# Patient Record
Sex: FEMALE | Race: WHITE | Employment: FULL TIME | URBAN - METROPOLITAN AREA
[De-identification: names, ages, dates, MRNs, and addresses within clinical notes are randomized per-mention and may not be internally consistent; named-entity substitution may affect disease eponyms.]

---

## 2017-10-25 PROBLEM — J30.89 ENVIRONMENTAL AND SEASONAL ALLERGIES: Status: ACTIVE | Noted: 2017-10-25

## 2017-11-14 ENCOUNTER — HOSPITAL ENCOUNTER (OUTPATIENT)
Dept: MRI IMAGING | Age: 62
Discharge: HOME | End: 2017-11-14
Attending: OTOLARYNGOLOGY
Payer: COMMERCIAL

## 2017-11-14 LAB — CREAT BLD-MCNC: 0.8 MG/DL (ref 0.8–1.5)

## 2017-11-14 PROCEDURE — A9577 INJ MULTIHANCE: HCPCS | Performed by: OTOLARYNGOLOGY

## 2017-11-14 PROCEDURE — 70553 MRI BRAIN STEM W/O & W/DYE: CPT

## 2017-11-14 PROCEDURE — 82565 ASSAY OF CREATININE: CPT

## 2017-11-14 PROCEDURE — 74011250636 HC RX REV CODE- 250/636: Performed by: OTOLARYNGOLOGY

## 2017-11-14 RX ORDER — SODIUM CHLORIDE 0.9 % (FLUSH) 0.9 %
10 SYRINGE (ML) INJECTION
Status: COMPLETED | OUTPATIENT
Start: 2017-11-14 | End: 2017-11-14

## 2017-11-14 RX ADMIN — Medication 10 ML: at 13:55

## 2017-11-14 RX ADMIN — GADOBENATE DIMEGLUMINE 14 ML: 529 INJECTION, SOLUTION INTRAVENOUS at 13:55

## 2018-02-15 PROBLEM — S82.852A CLOSED TRIMALLEOLAR FRACTURE OF LEFT ANKLE: Status: ACTIVE | Noted: 2018-02-15

## 2018-02-15 PROBLEM — Z98.890 HISTORY OF BUNIONECTOMY OF RIGHT GREAT TOE: Status: ACTIVE | Noted: 2018-02-15

## 2018-04-18 ENCOUNTER — APPOINTMENT (RX ONLY)
Dept: URBAN - METROPOLITAN AREA CLINIC 24 | Facility: CLINIC | Age: 63
Setting detail: DERMATOLOGY
End: 2018-04-18

## 2018-04-18 DIAGNOSIS — D485 NEOPLASM OF UNCERTAIN BEHAVIOR OF SKIN: ICD-10-CM

## 2018-04-18 DIAGNOSIS — L82.1 OTHER SEBORRHEIC KERATOSIS: ICD-10-CM

## 2018-04-18 DIAGNOSIS — Z92.25 PERSONAL HISTORY OF IMMUNOSUPPRESSION THERAPY: ICD-10-CM

## 2018-04-18 DIAGNOSIS — Z71.89 OTHER SPECIFIED COUNSELING: ICD-10-CM

## 2018-04-18 DIAGNOSIS — L81.4 OTHER MELANIN HYPERPIGMENTATION: ICD-10-CM

## 2018-04-18 PROBLEM — J30.1 ALLERGIC RHINITIS DUE TO POLLEN: Status: ACTIVE | Noted: 2018-04-18

## 2018-04-18 PROBLEM — D48.5 NEOPLASM OF UNCERTAIN BEHAVIOR OF SKIN: Status: ACTIVE | Noted: 2018-04-18

## 2018-04-18 PROCEDURE — 99203 OFFICE O/P NEW LOW 30 MIN: CPT | Mod: 25

## 2018-04-18 PROCEDURE — ? OTHER

## 2018-04-18 PROCEDURE — 11100: CPT

## 2018-04-18 PROCEDURE — ? BIOPSY BY SHAVE METHOD

## 2018-04-18 PROCEDURE — ? COUNSELING

## 2018-04-18 ASSESSMENT — LOCATION SIMPLE DESCRIPTION DERM
LOCATION SIMPLE: LEFT EAR
LOCATION SIMPLE: RIGHT SHOULDER
LOCATION SIMPLE: RIGHT UPPER BACK
LOCATION SIMPLE: RIGHT FOREARM
LOCATION SIMPLE: RIGHT CALF
LOCATION SIMPLE: LEFT POSTERIOR THIGH
LOCATION SIMPLE: LEFT FOREARM
LOCATION SIMPLE: RIGHT POSTERIOR THIGH
LOCATION SIMPLE: ABDOMEN
LOCATION SIMPLE: LEFT ANTERIOR NECK
LOCATION SIMPLE: LEFT SHOULDER

## 2018-04-18 ASSESSMENT — LOCATION ZONE DERM
LOCATION ZONE: TRUNK
LOCATION ZONE: NECK
LOCATION ZONE: EAR
LOCATION ZONE: LEG
LOCATION ZONE: ARM

## 2018-04-18 ASSESSMENT — LOCATION DETAILED DESCRIPTION DERM
LOCATION DETAILED: RIGHT MEDIAL UPPER BACK
LOCATION DETAILED: LEFT PROXIMAL DORSAL FOREARM
LOCATION DETAILED: RIGHT PROXIMAL DORSAL FOREARM
LOCATION DETAILED: LEFT POSTERIOR SHOULDER
LOCATION DETAILED: RIGHT SUPERIOR MEDIAL UPPER BACK
LOCATION DETAILED: LEFT DISTAL POSTERIOR THIGH
LOCATION DETAILED: RIGHT POSTERIOR SHOULDER
LOCATION DETAILED: SUBXIPHOID
LOCATION DETAILED: LEFT INFERIOR LATERAL NECK
LOCATION DETAILED: RIGHT PROXIMAL CALF
LOCATION DETAILED: LEFT CRUS OF HELIX
LOCATION DETAILED: RIGHT DISTAL POSTERIOR THIGH
LOCATION DETAILED: LEFT ANTERIOR SHOULDER

## 2018-04-18 NOTE — PROCEDURE: BIOPSY BY SHAVE METHOD
Size Of Lesion In Cm: 0
Detail Level: Detailed
Billing Type: Third-Party Bill
Curettage Text: The wound bed was treated with curettage after the biopsy was performed.
Electrodesiccation Text: The wound bed was treated with electrodesiccation after the biopsy was performed.
Accession #: [id number]
Electrodesiccation And Curettage Text: The wound bed was treated with electrodesiccation and curettage after the biopsy was performed.
Anesthesia Type: 1% lidocaine with 1:100,000 epinephrine and a 1:6 solution of 8.4% sodium bicarbonate
Cryotherapy Text: The wound bed was treated with cryotherapy after the biopsy was performed.
Anesthesia Volume In Cc: 0.5
Wound Care: Petrolatum
Bill For Surgical Tray: no
Biopsy Method: Dermablade
Silver Nitrate Text: The wound bed was treated with silver nitrate after the biopsy was performed.
Hemostasis: Aluminum Chloride
Was A Bandage Applied: Yes
Dressing: bandage
Biopsy Type: H and E
Type Of Destruction Used: Curettage

## 2018-04-18 NOTE — PROCEDURE: OTHER
Note Text (......Xxx Chief Complaint.): This diagnosis correlates with the
Detail Level: Simple
Other (Free Text): Just started cellcept for autoimmune hearloss

## 2018-05-04 ENCOUNTER — APPOINTMENT (RX ONLY)
Dept: URBAN - METROPOLITAN AREA CLINIC 24 | Facility: CLINIC | Age: 63
Setting detail: DERMATOLOGY
End: 2018-05-04

## 2018-05-04 PROBLEM — F32.9 MAJOR DEPRESSIVE DISORDER, SINGLE EPISODE, UNSPECIFIED: Status: ACTIVE | Noted: 2018-05-04

## 2018-05-04 PROBLEM — C44.519 BASAL CELL CARCINOMA OF SKIN OF OTHER PART OF TRUNK: Status: ACTIVE | Noted: 2018-05-04

## 2018-05-04 PROCEDURE — 17261 DSTRJ MAL LES T/A/L .6-1.0CM: CPT

## 2018-05-04 PROCEDURE — ? CURETTAGE AND DESTRUCTION

## 2018-05-04 NOTE — PROCEDURE: CURETTAGE AND DESTRUCTION
Bill As A Line Item Or As Units: Line Item
Render Post-Care Instructions In Note?: no
Additional Information: (Optional): The wound was cleaned, and a pressure dressing was applied.  The patient received detailed post-op instructions.
Detail Level: Detailed
Consent was obtained from the patient. The risks, benefits and alternatives to therapy were discussed in detail. Specifically, the risks of infection, scarring, bleeding, prolonged wound healing, nerve injury, incomplete removal, allergy to anesthesia and recurrence were addressed. Alternatives to ED&C, such as: surgical removal and XRT were also discussed.  Prior to the procedure, the treatment site was clearly identified and confirmed by the patient. All components of Universal Protocol/PAUSE Rule completed.
Post-Care Instructions: I reviewed with the patient in detail post-care instructions. Patient is to keep the area dry for 48 hours, and not to engage in any swimming until the area is healed. Should the patient develop any fevers, chills, bleeding, severe pain patient will contact the office immediately.
Number Of Curettages: 3
Size Of Lesion In Cm: 0.6
Total Volume (Ccs): 1
Cautery Type: electrodesiccation
Size Of Lesion After Curettage: 0.8
Anesthesia Type: 1% lidocaine with 1:100,000 epinephrine and a 1:10 solution of 8.4% sodium bicarbonate
What Was Performed First?: Curettage

## 2018-06-25 ENCOUNTER — HOSPITAL ENCOUNTER (OUTPATIENT)
Dept: PHYSICAL THERAPY | Age: 63
End: 2018-06-25
Attending: NURSE PRACTITIONER

## 2018-06-27 ENCOUNTER — HOSPITAL ENCOUNTER (OUTPATIENT)
Dept: CT IMAGING | Age: 63
Discharge: HOME | End: 2018-06-27
Attending: NURSE PRACTITIONER
Payer: SELF-PAY

## 2018-06-27 DIAGNOSIS — R07.89 CHEST DISCOMFORT: ICD-10-CM

## 2018-06-27 DIAGNOSIS — R00.2 PALPITATIONS: ICD-10-CM

## 2018-06-27 PROCEDURE — 75571 CT HRT W/O DYE W/CA TEST: CPT

## 2018-07-25 VITALS
WEIGHT: 156 LBS | RESPIRATION RATE: 16 BRPM | SYSTOLIC BLOOD PRESSURE: 157 MMHG | DIASTOLIC BLOOD PRESSURE: 72 MMHG | BODY MASS INDEX: 26.63 KG/M2 | OXYGEN SATURATION: 100 % | HEIGHT: 64 IN | HEART RATE: 53 BPM

## 2018-07-25 PROCEDURE — 84484 ASSAY OF TROPONIN QUANT: CPT | Performed by: EMERGENCY MEDICINE

## 2018-07-25 PROCEDURE — 99284 EMERGENCY DEPT VISIT MOD MDM: CPT | Performed by: EMERGENCY MEDICINE

## 2018-07-25 PROCEDURE — 80053 COMPREHEN METABOLIC PANEL: CPT | Performed by: EMERGENCY MEDICINE

## 2018-07-25 PROCEDURE — 85025 COMPLETE CBC W/AUTO DIFF WBC: CPT | Performed by: EMERGENCY MEDICINE

## 2018-07-25 PROCEDURE — 74011250636 HC RX REV CODE- 250/636: Performed by: EMERGENCY MEDICINE

## 2018-07-25 RX ORDER — MECLIZINE HYDROCHLORIDE 25 MG/1
25 TABLET ORAL
Status: COMPLETED | OUTPATIENT
Start: 2018-07-25 | End: 2018-07-25

## 2018-07-25 RX ADMIN — MECLIZINE HYDROCHLORIDE 25 MG: 25 TABLET ORAL at 23:51

## 2018-07-26 ENCOUNTER — HOSPITAL ENCOUNTER (EMERGENCY)
Age: 63
Discharge: HOME | End: 2018-07-26
Attending: EMERGENCY MEDICINE
Payer: COMMERCIAL

## 2018-07-26 ENCOUNTER — APPOINTMENT (OUTPATIENT)
Dept: CT IMAGING | Age: 63
End: 2018-07-26
Attending: EMERGENCY MEDICINE
Payer: COMMERCIAL

## 2018-07-26 DIAGNOSIS — R42 ACUTE ONSET OF SEVERE VERTIGO: Primary | ICD-10-CM

## 2018-07-26 LAB
ALBUMIN SERPL-MCNC: 3 G/DL (ref 3.2–4.6)
ALBUMIN/GLOB SERPL: 0.7 {RATIO} (ref 1.2–3.5)
ALP SERPL-CCNC: 85 U/L (ref 50–136)
ALT SERPL-CCNC: 25 U/L (ref 12–65)
ANION GAP SERPL CALC-SCNC: 10 MMOL/L (ref 7–16)
AST SERPL-CCNC: 36 U/L (ref 15–37)
ATRIAL RATE: 52 BPM
BASOPHILS # BLD: 0 K/UL (ref 0–0.2)
BASOPHILS NFR BLD: 1 % (ref 0–2)
BILIRUB SERPL-MCNC: 0.3 MG/DL (ref 0.2–1.1)
BUN SERPL-MCNC: 15 MG/DL (ref 8–23)
CALCIUM SERPL-MCNC: 8.1 MG/DL (ref 8.3–10.4)
CALCULATED P AXIS, ECG09: 69 DEGREES
CALCULATED R AXIS, ECG10: 57 DEGREES
CALCULATED T AXIS, ECG11: 63 DEGREES
CHLORIDE SERPL-SCNC: 111 MMOL/L (ref 98–107)
CO2 SERPL-SCNC: 24 MMOL/L (ref 21–32)
CREAT SERPL-MCNC: 0.68 MG/DL (ref 0.6–1)
DIAGNOSIS, 93000: NORMAL
DIFFERENTIAL METHOD BLD: NORMAL
EOSINOPHIL # BLD: 0.2 K/UL (ref 0–0.8)
EOSINOPHIL NFR BLD: 3 % (ref 0.5–7.8)
ERYTHROCYTE [DISTWIDTH] IN BLOOD BY AUTOMATED COUNT: 12.7 % (ref 11.9–14.6)
GLOBULIN SER CALC-MCNC: 4.2 G/DL (ref 2.3–3.5)
GLUCOSE SERPL-MCNC: 129 MG/DL (ref 65–100)
HCT VFR BLD AUTO: 40.3 % (ref 35.8–46.3)
HGB BLD-MCNC: 13.2 G/DL (ref 11.7–15.4)
IMM GRANULOCYTES # BLD: 0 K/UL (ref 0–0.5)
IMM GRANULOCYTES NFR BLD AUTO: 0 % (ref 0–5)
LYMPHOCYTES # BLD: 1.4 K/UL (ref 0.5–4.6)
LYMPHOCYTES NFR BLD: 32 % (ref 13–44)
MCH RBC QN AUTO: 30.1 PG (ref 26.1–32.9)
MCHC RBC AUTO-ENTMCNC: 32.8 G/DL (ref 31.4–35)
MCV RBC AUTO: 92 FL (ref 79.6–97.8)
MONOCYTES # BLD: 0.3 K/UL (ref 0.1–1.3)
MONOCYTES NFR BLD: 6 % (ref 4–12)
NEUTS SEG # BLD: 2.6 K/UL (ref 1.7–8.2)
NEUTS SEG NFR BLD: 58 % (ref 43–78)
P-R INTERVAL, ECG05: 208 MS
PLATELET # BLD AUTO: 174 K/UL (ref 150–450)
PMV BLD AUTO: 12.3 FL (ref 10.8–14.1)
POTASSIUM SERPL-SCNC: 4.7 MMOL/L (ref 3.5–5.1)
PROT SERPL-MCNC: 7.2 G/DL (ref 6.3–8.2)
Q-T INTERVAL, ECG07: 488 MS
QRS DURATION, ECG06: 90 MS
QTC CALCULATION (BEZET), ECG08: 453 MS
RBC # BLD AUTO: 4.38 M/UL (ref 4.05–5.25)
SODIUM SERPL-SCNC: 145 MMOL/L (ref 136–145)
TROPONIN I SERPL-MCNC: <0.02 NG/ML (ref 0.02–0.05)
VENTRICULAR RATE, ECG03: 52 BPM
WBC # BLD AUTO: 4.5 K/UL (ref 4.3–11.1)

## 2018-07-26 PROCEDURE — 93005 ELECTROCARDIOGRAM TRACING: CPT | Performed by: EMERGENCY MEDICINE

## 2018-07-26 RX ORDER — MECLIZINE HYDROCHLORIDE 25 MG/1
25 TABLET ORAL
Qty: 30 TAB | Refills: 0 | Status: SHIPPED | OUTPATIENT
Start: 2018-07-26 | End: 2018-12-11 | Stop reason: ALTCHOICE

## 2018-07-26 NOTE — DISCHARGE INSTRUCTIONS
Vertigo: Care Instructions  Your Care Instructions    Vertigo is the feeling that you or your surroundings are moving when there is no actual movement. It is often described as a feeling of spinning, whirling, falling, or tilting. Vertigo may make you vomit or feel nauseated. You may have trouble standing or walking and may lose your balance. Vertigo is often related to an inner ear problem, but it can have other more serious causes. If vertigo continues, you may need more tests to find its cause. Follow-up care is a key part of your treatment and safety. Be sure to make and go to all appointments, and call your doctor if you are having problems. It's also a good idea to know your test results and keep a list of the medicines you take. How can you care for yourself at home? · Do not lie flat on your back. Prop yourself up slightly. This may reduce the spinning feeling. Keep your eyes open. · Move slowly so that you do not fall. · If your doctor recommends medicine, take it exactly as directed. · Do not drive while you are having vertigo. Certain exercises, called Potts-Daroff exercises, can help decrease vertigo. To do Potts-Daroff exercises:  · Sit on the edge of a bed or sofa and quickly lie down on the side that causes the worst vertigo. Lie on your side with your ear down. · Stay in this position for at least 30 seconds or until the vertigo goes away. · Sit up. If this causes vertigo, wait for it to stop. · Repeat the procedure on the other side. · Repeat this 10 times. Do these exercises 2 times a day until the vertigo is gone. When should you call for help? Call 911 anytime you think you may need emergency care. For example, call if:    · You passed out (lost consciousness).     · You have symptoms of a stroke. These may include:  ¨ Sudden numbness, tingling, weakness, or loss of movement in your face, arm, or leg, especially on only one side of your body.   ¨ Sudden vision changes. ¨ Sudden trouble speaking. ¨ Sudden confusion or trouble understanding simple statements. ¨ Sudden problems with walking or balance. ¨ A sudden, severe headache that is different from past headaches.    Call your doctor now or seek immediate medical care if:    · Vertigo occurs with a fever, a headache, or ringing in your ears.     · You have new or increased nausea and vomiting.    Watch closely for changes in your health, and be sure to contact your doctor if:    · Vertigo gets worse or happens more often.     · Vertigo has not gotten better after 2 weeks. Where can you learn more? Go to http://nichole-elier.info/. Enter H269 in the search box to learn more about \"Vertigo: Care Instructions. \"  Current as of: May 12, 2017  Content Version: 11.7  © 2340-3178 [employer]. Care instructions adapted under license by [employer] (which disclaims liability or warranty for this information). If you have questions about a medical condition or this instruction, always ask your healthcare professional. [place] [address] any warranty or liability for your use of this information. Epley Maneuver for Vertigo: Exercises  Your Care Instructions  The Epley Maneuver is a series of movements your doctor may use to treat your vertigo. Here are the steps for the exercises. Your doctor or physical therapist will guide you through the movements. A single 10- to 15-minute session often is all that's needed. Crystal debris (canaliths) cause the vertigo. When your head is moved into different positions, the debris moves freely. This may cause your symptoms to stop. How to do the exercises  Step 1    1. You will sit on the doctor's exam table. Your legs will be out in front of you. The doctor or physical therapist will turn your head so that it is detention between looking straight ahead and looking to the side that causes the worst vertigo.   2. Without changing your head position, he or she will guide you back quickly. Your shoulders will be on the table. Your head will hang over the edge of the table. At this point, the side of your head that is causing the worst vertigo will face the floor. You'll stay in this position for 30 seconds or until your symptoms stop. Step 2    1. Then, the doctor or physical therapist will turn your head to the other side. You don't need to lift your head. The other side of your head will face the floor. You will stay in this position for 30 seconds or until your symptoms stop. Step 3    1. The doctor or physical therapist will help you roll your body in the same direction that your head is facing. You will lie on your side. (For example, if you are looking to your right, you will roll onto your right side.) The side that causes the worst symptoms should be facing up. You'll stay in this position for another 30 seconds or until your symptoms stop. Step 4    1. The doctor or physical therapist will then help you to sit back up. Your legs will hang off the table on the same side that you were facing. Follow-up care is a key part of your treatment and safety. Be sure to make and go to all appointments, and call your doctor if you are having problems. It's also a good idea to know your test results and keep a list of the medicines you take. Where can you learn more? Go to http://nichole-elier.info/. Enter E285 in the search box to learn more about \"Epley Maneuver for Vertigo: Exercises. \"  Current as of: May 12, 2017  Content Version: 11.7  © 1045-4415 [employer]. Care instructions adapted under license by [employer] (which disclaims liability or warranty for this information). If you have questions about a medical condition or this instruction, always ask your healthcare professional. [address] any warranty or liability for your use of this information.

## 2018-07-26 NOTE — ED TRIAGE NOTES
Patient arrives via [facility] from home. Patient reports sudden onset of dizziness, spinning while she was sitting and looking at her computer. Patient reports associated diaphoresis and hot flash with onset of dizziness. Patient reports now she is cold. Patient denies chest pain, shortness of breath, abdominal pain. Patient reports hx of abdominal surgery due to acid reflux so that she is unable to vomit. Patient presents wretching, but not vomiting. Patient alert and oriented x4, denies hx of vertigo. Patient reports recent overseas flight on Saturday.

## 2018-07-26 NOTE — ED NOTES
I have reviewed discharge instructions with the patient and family. The patient and family verbalized understanding. Patient left ED via Discharge Method: wheelchair to Home with family. Opportunity for questions and clarification provided. Patient given 1 scripts. To continue your aftercare when you leave the hospital, you may receive an automated call from our care team to check in on how you are doing. This is a free service and part of our promise to provide the best care and service to meet your aftercare needs.  If you have questions, or wish to unsubscribe from this service please call [phone]. Thank you for Choosing our [facility].

## 2018-07-26 NOTE — ED PROVIDER NOTES
HPI Comments: Pt presents with sudden onset of vertigo this evening. She states she was sitting at her computer when the symptoms began. The symptoms consisted of feeling warm all over and flushed and then she noted the room to be spinning and became nauseated. The symptoms lasted until  She was in the emergency department and had been given meclizine and now the symptoms have resolved. She denies prior occurrence. She does report a history of right-sided hearing loss and has a hearing aid in that side she also has a tympanic membrane rupture on that side and is being followed by ear nose and throat. She denies any unusual tinnitus or other abnormal sounds during the episode and had no other pre-existing or coexisting symptoms. Patient is a 58 y.o. female presenting with dizziness. The history is provided by the patient and a friend. Dizziness   This is a new problem. The current episode started 3 to 5 hours ago. The problem has been resolved. There was no focality noted. Primary symptoms comment: true vertigo. There has been no fever. Associated symptoms include vomiting and nausea. Pertinent negatives include no shortness of breath, no chest pain, no altered mental status, no confusion, no headaches, no choking, no bowel incontinence and no bladder incontinence. There were no medications administered prior to arrival. Associated medical issues comments: Unilateral hearing loss, ruptured TM on the right. .        Past Medical History:   Diagnosis Date    Allergic rhinitis, cause unspecified 7/1/2015    Anxiety     Closed fracture of medial malleolus     left foot    Enthesopathy of ankle and tarsus, unspecified     Esophageal reflux 7/1/2015    Hallux valgus (acquired)     History of gestational diabetes        Past Surgical History:   Procedure Laterality Date    HX APPENDECTOMY  1977    HX BUNIONECTOMY Right     toe    HX [address] HX COLONOSCOPY  04/18/2017    normal colon- repat 2022    HX ENDOSCOPY  04/18/2017    small gastric polyps less than 5mm- DR. Aleida Ramon    HX ORTHOPAEDIC Left 1997    ankle repair    HX OTHER SURGICAL  2002    Nissen fundiplication         Family History:   Problem Relation Age of Onset    Cancer Mother      Liver    Cancer Father      Bone    Diabetes Father     Cancer Other      Colon       Social History     Social History    Marital status:      Spouse name: N/A    Number of children: N/A    Years of education: N/A     Occupational History    Not on file. Social History Main Topics    Smoking status: Never Smoker    Smokeless tobacco: Never Used    Alcohol use 0.0 oz/week     0 Standard drinks or equivalent per week      Comment: social    Drug use: No    Sexual activity: Not on file     Other Topics Concern    Not on file     Social History Narrative         ALLERGIES: Levaquin [levofloxacin]; Cellcept [mycophenolate mofetil]; and Other medication    Review of Systems   Respiratory: Negative for choking and shortness of breath. Cardiovascular: Negative for chest pain. Gastrointestinal: Positive for nausea and vomiting. Negative for bowel incontinence. Genitourinary: Negative for bladder incontinence. Neurological: Positive for dizziness. Negative for headaches. Psychiatric/Behavioral: Negative for confusion. All other systems reviewed and are negative. Vitals:    07/25/18 2341   BP: 157/72   Pulse: (!) 53   Resp: 16   SpO2: 100%   Weight: 70.8 kg (156 lb)   Height: 5' 4\" (1.626 m)            Physical Exam   Constitutional: She is oriented to person, place, and time. She appears well-developed and well-nourished. No distress. HENT:   Head: Normocephalic and atraumatic. Nose: Nose normal.   Mouth/Throat: Oropharynx is clear and moist. No oropharyngeal exudate. Right TM is noted to have a rupture posteriorly without any drainage.   Left TM is normal in appearance with some serous fluid noted within the middle ear   Eyes: Conjunctivae and EOM are normal. Pupils are equal, round, and reactive to light. Neck: Normal range of motion. Neck supple. No JVD present. No bruits   Cardiovascular: Normal rate. Musculoskeletal: Normal range of motion. Lymphadenopathy:     She has no cervical adenopathy. Neurological: She is alert and oriented to person, place, and time. She has normal reflexes. She displays normal reflexes. No cranial nerve deficit. She exhibits normal muscle tone. Coordination normal.   Skin: Skin is warm and dry. Psychiatric: She has a normal mood and affect. Her behavior is normal.   Nursing note and vitals reviewed.        MDM  Number of Diagnoses or Management Options  Acute onset of severe vertigo:      Amount and/or Complexity of Data Reviewed  Clinical lab tests: ordered and reviewed  Independent visualization of images, tracings, or specimens: yes    Risk of Complications, Morbidity, and/or Mortality  Presenting problems: moderate  Diagnostic procedures: moderate  Management options: moderate    Patient Progress  Patient progress: improved        ED Course       Procedures

## 2018-11-07 ENCOUNTER — APPOINTMENT (RX ONLY)
Dept: URBAN - METROPOLITAN AREA CLINIC 24 | Facility: CLINIC | Age: 63
Setting detail: DERMATOLOGY
End: 2018-11-07

## 2018-11-07 DIAGNOSIS — L81.4 OTHER MELANIN HYPERPIGMENTATION: ICD-10-CM

## 2018-11-07 DIAGNOSIS — Z85.828 PERSONAL HISTORY OF OTHER MALIGNANT NEOPLASM OF SKIN: ICD-10-CM

## 2018-11-07 DIAGNOSIS — L21.8 OTHER SEBORRHEIC DERMATITIS: ICD-10-CM

## 2018-11-07 DIAGNOSIS — Z71.89 OTHER SPECIFIED COUNSELING: ICD-10-CM

## 2018-11-07 DIAGNOSIS — L82.1 OTHER SEBORRHEIC KERATOSIS: ICD-10-CM

## 2018-11-07 PROBLEM — L55.1 SUNBURN OF SECOND DEGREE: Status: ACTIVE | Noted: 2018-11-07

## 2018-11-07 PROCEDURE — ? COUNSELING

## 2018-11-07 PROCEDURE — 99213 OFFICE O/P EST LOW 20 MIN: CPT

## 2018-11-07 PROCEDURE — ? OTHER

## 2018-11-07 ASSESSMENT — LOCATION SIMPLE DESCRIPTION DERM
LOCATION SIMPLE: ABDOMEN
LOCATION SIMPLE: LEFT LOWER BACK
LOCATION SIMPLE: LEFT ANTERIOR NECK
LOCATION SIMPLE: POSTERIOR SCALP
LOCATION SIMPLE: LEFT SHOULDER
LOCATION SIMPLE: RIGHT UPPER BACK
LOCATION SIMPLE: LEFT FOREARM
LOCATION SIMPLE: RIGHT SHOULDER
LOCATION SIMPLE: LEFT UPPER BACK

## 2018-11-07 ASSESSMENT — LOCATION ZONE DERM
LOCATION ZONE: ARM
LOCATION ZONE: NECK
LOCATION ZONE: SCALP
LOCATION ZONE: TRUNK

## 2018-11-07 ASSESSMENT — LOCATION DETAILED DESCRIPTION DERM
LOCATION DETAILED: POSTERIOR MID-PARIETAL SCALP
LOCATION DETAILED: SUBXIPHOID
LOCATION DETAILED: LEFT POSTERIOR SHOULDER
LOCATION DETAILED: LEFT PROXIMAL DORSAL FOREARM
LOCATION DETAILED: RIGHT MEDIAL UPPER BACK
LOCATION DETAILED: RIGHT SUPERIOR MEDIAL UPPER BACK
LOCATION DETAILED: LEFT SUPERIOR LATERAL MIDBACK
LOCATION DETAILED: RIGHT POSTERIOR SHOULDER
LOCATION DETAILED: LEFT INFERIOR LATERAL NECK
LOCATION DETAILED: LEFT MID-UPPER BACK

## 2018-11-07 NOTE — PROCEDURE: OTHER
Detail Level: Simple
Other (Free Text): Declined treatment
Note Text (......Xxx Chief Complaint.): This diagnosis correlates with the

## 2019-04-09 ENCOUNTER — APPOINTMENT (RX ONLY)
Dept: URBAN - METROPOLITAN AREA CLINIC 23 | Facility: CLINIC | Age: 64
Setting detail: DERMATOLOGY
End: 2019-04-09

## 2019-04-09 DIAGNOSIS — Z71.89 OTHER SPECIFIED COUNSELING: ICD-10-CM

## 2019-04-09 DIAGNOSIS — L82.1 OTHER SEBORRHEIC KERATOSIS: ICD-10-CM

## 2019-04-09 DIAGNOSIS — L81.4 OTHER MELANIN HYPERPIGMENTATION: ICD-10-CM

## 2019-04-09 DIAGNOSIS — L21.8 OTHER SEBORRHEIC DERMATITIS: ICD-10-CM

## 2019-04-09 DIAGNOSIS — Z85.828 PERSONAL HISTORY OF OTHER MALIGNANT NEOPLASM OF SKIN: ICD-10-CM

## 2019-04-09 PROBLEM — E13.9 OTHER SPECIFIED DIABETES MELLITUS WITHOUT COMPLICATIONS: Status: ACTIVE | Noted: 2019-04-09

## 2019-04-09 PROBLEM — L70.0 ACNE VULGARIS: Status: ACTIVE | Noted: 2019-04-09

## 2019-04-09 PROCEDURE — ? OTHER

## 2019-04-09 PROCEDURE — ? COUNSELING

## 2019-04-09 PROCEDURE — 99214 OFFICE O/P EST MOD 30 MIN: CPT

## 2019-04-09 ASSESSMENT — LOCATION SIMPLE DESCRIPTION DERM
LOCATION SIMPLE: LEFT LOWER BACK
LOCATION SIMPLE: RIGHT UPPER BACK
LOCATION SIMPLE: LEFT SHOULDER
LOCATION SIMPLE: LEFT THIGH
LOCATION SIMPLE: LEFT FOREARM
LOCATION SIMPLE: LEFT UPPER BACK
LOCATION SIMPLE: RIGHT THIGH
LOCATION SIMPLE: LEFT POSTERIOR THIGH
LOCATION SIMPLE: POSTERIOR SCALP
LOCATION SIMPLE: ABDOMEN
LOCATION SIMPLE: LEFT ANTERIOR NECK
LOCATION SIMPLE: RIGHT SHOULDER

## 2019-04-09 ASSESSMENT — LOCATION ZONE DERM
LOCATION ZONE: TRUNK
LOCATION ZONE: ARM
LOCATION ZONE: NECK
LOCATION ZONE: SCALP
LOCATION ZONE: LEG

## 2019-04-09 ASSESSMENT — LOCATION DETAILED DESCRIPTION DERM
LOCATION DETAILED: LEFT PROXIMAL POSTERIOR THIGH
LOCATION DETAILED: LEFT PROXIMAL DORSAL FOREARM
LOCATION DETAILED: RIGHT MEDIAL UPPER BACK
LOCATION DETAILED: LEFT POSTERIOR SHOULDER
LOCATION DETAILED: POSTERIOR MID-PARIETAL SCALP
LOCATION DETAILED: RIGHT ANTERIOR PROXIMAL THIGH
LOCATION DETAILED: LEFT SUPERIOR LATERAL MIDBACK
LOCATION DETAILED: RIGHT POSTERIOR SHOULDER
LOCATION DETAILED: RIGHT SUPERIOR MEDIAL UPPER BACK
LOCATION DETAILED: SUBXIPHOID
LOCATION DETAILED: LEFT ANTERIOR PROXIMAL THIGH
LOCATION DETAILED: LEFT MID-UPPER BACK
LOCATION DETAILED: RIGHT ANTERIOR DISTAL THIGH
LOCATION DETAILED: LEFT INFERIOR LATERAL NECK

## 2019-04-09 NOTE — PROCEDURE: OTHER
Note Text (......Xxx Chief Complaint.): This diagnosis correlates with the
Detail Level: Simple
Other (Free Text): Declined treatment

## 2019-07-17 RX ORDER — SODIUM CHLORIDE 0.9 % (FLUSH) 0.9 %
5-40 SYRINGE (ML) INJECTION EVERY 8 HOURS
Status: CANCELLED | OUTPATIENT
Start: 2019-07-17

## 2019-07-17 RX ORDER — SODIUM CHLORIDE 0.9 % (FLUSH) 0.9 %
5-40 SYRINGE (ML) INJECTION AS NEEDED
Status: CANCELLED | OUTPATIENT
Start: 2019-07-17

## 2019-07-19 ENCOUNTER — APPOINTMENT (RX ONLY)
Dept: URBAN - METROPOLITAN AREA CLINIC 24 | Facility: CLINIC | Age: 64
Setting detail: DERMATOLOGY
End: 2019-07-19

## 2019-07-19 DIAGNOSIS — Z85.828 PERSONAL HISTORY OF OTHER MALIGNANT NEOPLASM OF SKIN: ICD-10-CM

## 2019-07-19 DIAGNOSIS — L81.4 OTHER MELANIN HYPERPIGMENTATION: ICD-10-CM

## 2019-07-19 DIAGNOSIS — D18.0 HEMANGIOMA: ICD-10-CM

## 2019-07-19 DIAGNOSIS — Z71.89 OTHER SPECIFIED COUNSELING: ICD-10-CM

## 2019-07-19 DIAGNOSIS — L90.5 SCAR CONDITIONS AND FIBROSIS OF SKIN: ICD-10-CM

## 2019-07-19 PROBLEM — D18.01 HEMANGIOMA OF SKIN AND SUBCUTANEOUS TISSUE: Status: ACTIVE | Noted: 2019-07-19

## 2019-07-19 PROCEDURE — ? COUNSELING

## 2019-07-19 PROCEDURE — 99213 OFFICE O/P EST LOW 20 MIN: CPT

## 2019-07-19 ASSESSMENT — LOCATION SIMPLE DESCRIPTION DERM
LOCATION SIMPLE: CHEST
LOCATION SIMPLE: RIGHT SHOULDER
LOCATION SIMPLE: RIGHT UPPER BACK
LOCATION SIMPLE: ABDOMEN
LOCATION SIMPLE: POSTERIOR NECK

## 2019-07-19 ASSESSMENT — LOCATION ZONE DERM
LOCATION ZONE: ARM
LOCATION ZONE: NECK
LOCATION ZONE: TRUNK

## 2019-07-19 ASSESSMENT — LOCATION DETAILED DESCRIPTION DERM
LOCATION DETAILED: STERNAL NOTCH
LOCATION DETAILED: RIGHT MEDIAL UPPER BACK
LOCATION DETAILED: RIGHT POSTERIOR SHOULDER
LOCATION DETAILED: RIGHT MEDIAL TRAPEZIAL NECK
LOCATION DETAILED: RIGHT SUPERIOR UPPER BACK
LOCATION DETAILED: RIGHT LATERAL ABDOMEN

## 2019-07-26 NOTE — H&P
Date of Service: 2019-07-08  Work Status:  ????? Allergies:LevoFLOXacin(Bone pain/ache);Mycophenolate Mofetil(Vertigo)  Medications:Biotin;BusPIRone HCl;Estroven Nighttime; Flonase;Meclizine HCl (25 MG);Meloxicam (15 MG, Take 1 tablet(s) by mouth once a day for 30 days); Multivitamin;Potassium Gluconate;Vitamin B-12;Vitamin C;Vitamin D;ZyrTEC Allergy    CC: Pain of the left shoulder and locking of the right thumb    HPI:  The patient is a 55-year-old  whom I have seen recently on a few occasions. She was here last on 03/29/2019. At that time I injected both her left shoulder for bursitis and tendonitis as well as her right trigger thumb. She has had both and initially did well with the right thumb having no further triggering for a while. Since around the middle of 06/2019 it has started locking and catching again and has gotten sore again, actually the base of the thumb at the A1 pulley. She has had a previous trigger finger release done in the same hand at the hand center several years ago and has done well from that. Her left shoulder also was better following the injection, but she says it still did ache and hurt some. She did reasonably well until around the first Sunday in 05/2019 when she returned from a trip, and her dog jumped off the bed and into her arms and apparently injured the shoulder again some. It has been bothering and hurting her again both at night as well as reaching behind her back and above shoulder level. She has had physical therapy in the past as well as I have sent her to a chiropractor and done some acupuncture and has had the injection that I have given her. She has hurt now for over a year. PE: The patient has tenderness at the A1 pulley at the base of the right thumb and has obvious triggering. She does not quite fully extend at the IP joint. The other fingers flex and extend well.   In the left shoulder, the patient has some anterior and posterior subacromial tenderness. She can raise the arm to about 140 degrees of flexion and abduction. External rotation is to 90 degrees but with pain. Internal rotation is to just again about the L4-L5 region behind her back. She has positive overhead impingement test.  She has good strength in abduction against resistance. Circulation is intact in both upper extremities. Neurologic exam is intact bilaterally to light touch in the radial, ulnar and median nerve distributions. Skin and nails are normal.    DISPOSITION:  The problems were discussed with the patient. She had gotten good relief from her previous trigger finger release done surgically. She would like to do that now with the thumb as the injection did not give her full relief. I discussed going ahead with right trigger thumb release as an outpatient and under MAC and local anesthetic. She wishes to proceed with that. She also has had problems now with her left shoulder for about 14 or 15 months. She has had trial with therapy, chiropractic treatment and acupuncture as well as a steroid injection. She continues with problems. I have recommended that we go ahead with an MRI, which she would like to do. We will schedule her for that. She will be seen back following her MRI. I will go ahead and schedule for the right trigger thumb release and see her back with that as well.

## 2019-07-29 ENCOUNTER — ANESTHESIA EVENT (OUTPATIENT)
Dept: SURGERY | Age: 64
End: 2019-07-29
Payer: COMMERCIAL

## 2019-07-30 ENCOUNTER — ANESTHESIA (OUTPATIENT)
Dept: SURGERY | Age: 64
End: 2019-07-30
Payer: COMMERCIAL

## 2019-07-30 ENCOUNTER — HOSPITAL ENCOUNTER (OUTPATIENT)
Age: 64
Setting detail: OUTPATIENT SURGERY
Discharge: HOME | End: 2019-07-30
Attending: ORTHOPAEDIC SURGERY | Admitting: ORTHOPAEDIC SURGERY
Payer: COMMERCIAL

## 2019-07-30 VITALS
BODY MASS INDEX: 26.78 KG/M2 | OXYGEN SATURATION: 97 % | HEART RATE: 66 BPM | DIASTOLIC BLOOD PRESSURE: 60 MMHG | SYSTOLIC BLOOD PRESSURE: 118 MMHG | TEMPERATURE: 97.4 F | WEIGHT: 156 LBS | RESPIRATION RATE: 16 BRPM

## 2019-07-30 DIAGNOSIS — G89.18 POST-OP PAIN: Primary | ICD-10-CM

## 2019-07-30 LAB — POTASSIUM BLD-SCNC: 3.9 MMOL/L (ref 3.5–5.1)

## 2019-07-30 PROCEDURE — 77030000032 HC CUF TRNQT ZIMM -B: Performed by: ORTHOPAEDIC SURGERY

## 2019-07-30 PROCEDURE — 77030031139 HC SUT VCRL2 J&J -A: Performed by: ORTHOPAEDIC SURGERY

## 2019-07-30 PROCEDURE — 84132 ASSAY OF SERUM POTASSIUM: CPT

## 2019-07-30 PROCEDURE — 74011250636 HC RX REV CODE- 250/636: Performed by: ANESTHESIOLOGY

## 2019-07-30 PROCEDURE — 74011250636 HC RX REV CODE- 250/636

## 2019-07-30 PROCEDURE — 74011000250 HC RX REV CODE- 250: Performed by: ORTHOPAEDIC SURGERY

## 2019-07-30 PROCEDURE — 77030039266 HC ADH SKN EXOFIN S2SG -A: Performed by: ORTHOPAEDIC SURGERY

## 2019-07-30 PROCEDURE — 76060000032 HC ANESTHESIA 0.5 TO 1 HR: Performed by: ORTHOPAEDIC SURGERY

## 2019-07-30 PROCEDURE — 76010000154 HC OR TIME FIRST 0.5 HR: Performed by: ORTHOPAEDIC SURGERY

## 2019-07-30 PROCEDURE — 76210000021 HC REC RM PH II 0.5 TO 1 HR: Performed by: ORTHOPAEDIC SURGERY

## 2019-07-30 PROCEDURE — 77030018836 HC SOL IRR NACL ICUM -A: Performed by: ORTHOPAEDIC SURGERY

## 2019-07-30 RX ORDER — MIDAZOLAM HYDROCHLORIDE 1 MG/ML
2 INJECTION, SOLUTION INTRAMUSCULAR; INTRAVENOUS ONCE
Status: DISCONTINUED | OUTPATIENT
Start: 2019-07-30 | End: 2019-07-30 | Stop reason: HOSPADM

## 2019-07-30 RX ORDER — MIDAZOLAM HYDROCHLORIDE 1 MG/ML
2 INJECTION, SOLUTION INTRAMUSCULAR; INTRAVENOUS
Status: DISCONTINUED | OUTPATIENT
Start: 2019-07-30 | End: 2019-07-30 | Stop reason: HOSPADM

## 2019-07-30 RX ORDER — LIDOCAINE HYDROCHLORIDE 10 MG/ML
0.1 INJECTION INFILTRATION; PERINEURAL AS NEEDED
Status: DISCONTINUED | OUTPATIENT
Start: 2019-07-30 | End: 2019-07-30 | Stop reason: HOSPADM

## 2019-07-30 RX ORDER — ACETAMINOPHEN 500 MG
1000 TABLET ORAL ONCE
Status: DISCONTINUED | OUTPATIENT
Start: 2019-07-30 | End: 2019-07-30 | Stop reason: HOSPADM

## 2019-07-30 RX ORDER — FENTANYL CITRATE 50 UG/ML
100 INJECTION, SOLUTION INTRAMUSCULAR; INTRAVENOUS ONCE
Status: DISCONTINUED | OUTPATIENT
Start: 2019-07-30 | End: 2019-07-30 | Stop reason: HOSPADM

## 2019-07-30 RX ORDER — FLUMAZENIL 0.1 MG/ML
0.2 INJECTION INTRAVENOUS
Status: DISCONTINUED | OUTPATIENT
Start: 2019-07-30 | End: 2019-07-30 | Stop reason: HOSPADM

## 2019-07-30 RX ORDER — PROPOFOL 10 MG/ML
INJECTION, EMULSION INTRAVENOUS AS NEEDED
Status: DISCONTINUED | OUTPATIENT
Start: 2019-07-30 | End: 2019-07-30 | Stop reason: HOSPADM

## 2019-07-30 RX ORDER — NALOXONE HYDROCHLORIDE 0.4 MG/ML
0.2 INJECTION, SOLUTION INTRAMUSCULAR; INTRAVENOUS; SUBCUTANEOUS AS NEEDED
Status: DISCONTINUED | OUTPATIENT
Start: 2019-07-30 | End: 2019-07-30 | Stop reason: HOSPADM

## 2019-07-30 RX ORDER — SODIUM CHLORIDE 0.9 % (FLUSH) 0.9 %
5-40 SYRINGE (ML) INJECTION AS NEEDED
Status: DISCONTINUED | OUTPATIENT
Start: 2019-07-30 | End: 2019-07-30 | Stop reason: HOSPADM

## 2019-07-30 RX ORDER — OXYCODONE HYDROCHLORIDE 5 MG/1
10 TABLET ORAL
Status: DISCONTINUED | OUTPATIENT
Start: 2019-07-30 | End: 2019-07-30 | Stop reason: HOSPADM

## 2019-07-30 RX ORDER — SODIUM CHLORIDE 0.9 % (FLUSH) 0.9 %
5-40 SYRINGE (ML) INJECTION EVERY 8 HOURS
Status: DISCONTINUED | OUTPATIENT
Start: 2019-07-30 | End: 2019-07-30 | Stop reason: HOSPADM

## 2019-07-30 RX ORDER — LIDOCAINE HYDROCHLORIDE 20 MG/ML
INJECTION, SOLUTION EPIDURAL; INFILTRATION; INTRACAUDAL; PERINEURAL AS NEEDED
Status: DISCONTINUED | OUTPATIENT
Start: 2019-07-30 | End: 2019-07-30 | Stop reason: HOSPADM

## 2019-07-30 RX ORDER — HYDROCODONE BITARTRATE AND ACETAMINOPHEN 5; 325 MG/1; MG/1
1 TABLET ORAL
Qty: 15 TAB | Refills: 0 | Status: SHIPPED | OUTPATIENT
Start: 2019-07-30 | End: 2019-08-02

## 2019-07-30 RX ORDER — LIDOCAINE HYDROCHLORIDE AND EPINEPHRINE 10; 10 MG/ML; UG/ML
INJECTION, SOLUTION INFILTRATION; PERINEURAL AS NEEDED
Status: DISCONTINUED | OUTPATIENT
Start: 2019-07-30 | End: 2019-07-30 | Stop reason: HOSPADM

## 2019-07-30 RX ORDER — SODIUM CHLORIDE, SODIUM LACTATE, POTASSIUM CHLORIDE, CALCIUM CHLORIDE 600; 310; 30; 20 MG/100ML; MG/100ML; MG/100ML; MG/100ML
100 INJECTION, SOLUTION INTRAVENOUS CONTINUOUS
Status: DISCONTINUED | OUTPATIENT
Start: 2019-07-30 | End: 2019-07-30 | Stop reason: HOSPADM

## 2019-07-30 RX ORDER — HYDROMORPHONE HYDROCHLORIDE 2 MG/ML
0.5 INJECTION, SOLUTION INTRAMUSCULAR; INTRAVENOUS; SUBCUTANEOUS
Status: DISCONTINUED | OUTPATIENT
Start: 2019-07-30 | End: 2019-07-30 | Stop reason: HOSPADM

## 2019-07-30 RX ORDER — DIPHENHYDRAMINE HYDROCHLORIDE 50 MG/ML
12.5 INJECTION, SOLUTION INTRAMUSCULAR; INTRAVENOUS
Status: DISCONTINUED | OUTPATIENT
Start: 2019-07-30 | End: 2019-07-30 | Stop reason: HOSPADM

## 2019-07-30 RX ORDER — MIDAZOLAM HYDROCHLORIDE 1 MG/ML
INJECTION, SOLUTION INTRAMUSCULAR; INTRAVENOUS AS NEEDED
Status: DISCONTINUED | OUTPATIENT
Start: 2019-07-30 | End: 2019-07-30 | Stop reason: HOSPADM

## 2019-07-30 RX ORDER — OXYCODONE HYDROCHLORIDE 5 MG/1
5 TABLET ORAL
Status: DISCONTINUED | OUTPATIENT
Start: 2019-07-30 | End: 2019-07-30 | Stop reason: HOSPADM

## 2019-07-30 RX ADMIN — PROPOFOL 20 MG: 10 INJECTION, EMULSION INTRAVENOUS at 08:22

## 2019-07-30 RX ADMIN — PROPOFOL 20 MG: 10 INJECTION, EMULSION INTRAVENOUS at 08:14

## 2019-07-30 RX ADMIN — SODIUM CHLORIDE, SODIUM LACTATE, POTASSIUM CHLORIDE, AND CALCIUM CHLORIDE 100 ML/HR: 600; 310; 30; 20 INJECTION, SOLUTION INTRAVENOUS at 06:30

## 2019-07-30 RX ADMIN — MIDAZOLAM HYDROCHLORIDE 1 MG: 1 INJECTION, SOLUTION INTRAMUSCULAR; INTRAVENOUS at 08:07

## 2019-07-30 RX ADMIN — PROPOFOL 30 MG: 10 INJECTION, EMULSION INTRAVENOUS at 08:06

## 2019-07-30 RX ADMIN — PROPOFOL 15 MG: 10 INJECTION, EMULSION INTRAVENOUS at 08:15

## 2019-07-30 RX ADMIN — LIDOCAINE HYDROCHLORIDE 30 MG: 20 INJECTION, SOLUTION EPIDURAL; INFILTRATION; INTRACAUDAL; PERINEURAL at 08:06

## 2019-07-30 NOTE — OP NOTES
Trigger Thumb Release     Adwoa LucioDhaval Mcghee     7/30/2019    Indications: This is a 61 yrs female with a right trigger  thumb. The patient is admitted for surgery as conservative measures have failed. Pre-operative Diagnosis:   RIGHT TRIGGER THUMB              Post-operative Diagnosis:   RIGHT TRIGGER THUMB    Procedure:   1. RELEASE OF RIGHT TRIGGER THUMB    Surgeon: Marlys Yin MD    Anesthesia: MAC and Local with 1% Lidocaine with epinephrine    Procedure Details: The patient was taken to the operating suite and positioned on the operating table in the supine fashion. The patient was prepped and draped. A timeout was done by the operating team identifing the patient, surgeon, procedure and site. With the patient under IV sedation the area around the A1 pulley and base of the thumb was injected with 1% Lidocaine with epinephrine. A tourniquet was not used. A 1.5 cm transverse incision was then made over the A1 pulley at the base of the right  thumb. Spreading dissection was used to expose the flexor tendon sheath. Care was taken to watch for the digital nerves. Small Ragnal retractors were used for better visualization. The sheath was opened with the tip of the scalpal initially and then the tenotomy scissors were used to open the A1 pulley distally and the tenosynovium proximally. The flexor pollicis longus was inspected. There was longitudinal fraying of the tendon. The patient was able to participate actively in flexing and extending the thumb . There was no evidence of any further triggering. The incision was closed with 4-0 Vicryl Rapid subcuticular stitches and then re-enforced with surgical adhesive. A sterile gauze and Coy dressing was applied. The patient was then transferred to the Recovery Room in stable condition    Findings:  Moderate fraying of the FPL Tendon    Tourniquet Time: * No tourniquets in log *      Signed By: Marlys Yin MD [email or web address]  [phone]      History and Physical    Patient: Lamont Mills MRN: [id number]  SSN: [id number]    YOB: 1951  Age: 77 y.o. Sex: female      Subjective:      Lamont Mills is a 77 y.o. female who presents for increased risk CRCS. Pt has a personal history of colon polyps. Denies symptoms or concerns. .     Past Medical History:   Diagnosis Date    Allergic rhinitis     Chronic pain     lower back    Hyperlipidemia     Raynaud phenomenon      Past Surgical History:   Procedure Laterality Date    ENDOSCOPY, COLON, DIAGNOSTIC  2012     5 years, Dr. Clayton Smoker HX COLONOSCOPY  3/14/12    2 mm tubular adenoma--[facility]    HX DILATION AND CURETTAGE      HX HYSTERECTOMY      supracervical    HX OOPHORECTOMY      bilateral    HX TONSILLECTOMY        History reviewed. No pertinent family history. Social History   Substance Use Topics    Smoking status: Never Smoker    Smokeless tobacco: Never Used    Alcohol use No      Prior to Admission medications    Medication Sig Start Date End Date Taking? Authorizing Provider   montelukast (SINGULAIR) 10 mg tablet take 1 tablet by mouth daily  Patient taking differently: take 1 tablet by mouth HS 12/2/16  Yes Paulo Sethi MD   celecoxib (CELEBREX) 200 mg capsule Take 1 Cap by mouth daily. Patient taking differently: Take 200 mg by mouth daily as needed. 6/20/16 9/19/17 Yes Paulo Sethi MD   fluticasone [facility]) 50 mcg/actuation nasal spray instill 2 sprays into each nostril daily 10/7/15  Yes Paulo Sethi MD   cetirizine (ZYRTEC) 10 mg tablet Take  by mouth daily. Yes Historical Provider   estradiol (VIVELLE) 0.1 mg/24 hr 1 Patch by TransDERmal route every Monday.    Yes Historical Provider        Allergies   Allergen Reactions    Cephalosporins Other (comments)     Unable to take    Codeine Nausea and Vomiting    Penicillins Anaphylaxis       Review of Systems:  A comprehensive review of systems was negative except for that written in the History of Present Illness. Objective:     Vitals:    03/02/17 1358   Weight: 57.6 kg (127 lb)   Height: 5' 8\" (1.727 m)        Physical Exam:  GENERAL: alert, cooperative, no distress, appears stated age  LUNG: clear to auscultation bilaterally  HEART: regular rate and rhythm, S1, S2 normal, no murmur, click, rub or gallop  ABDOMEN: soft, non-tender. Bowel sounds normal. No masses,  no organomegaly  NEUROLOGIC: alert & oriented x 3    Assessment:     1. Personal history of colon polyps    Plan:     1. Colonoscopy    Signed By: Candida Fisher MD     March 13, 2017      Candida Fisher MD  [facility] [address] - [phone]  [email or web address]

## 2019-07-30 NOTE — DISCHARGE INSTRUCTIONS
POST OP INSTRUCTIONS      1. Patient has appointment for 8/8/19 at 2:30 PM at the [place] office. 2.  For problems call Dr Noel Kim, [address] [facility],  [phone]          Appointments only,  [phone]    3. Ice and elevate the surgical site. 4.  May remove dressings and wash in running water or shower. Then cover the  incision with Band-aids. Do not submerge in bath or dish water. ACTIVITY  Ice and elevate the surgical site while awake as needed  As tolerated and as directed by your doctor. DIET  · Clear liquids until no nausea or vomiting; then light diet for the first day. · Advance to regular diet on second day, unless your doctor orders otherwise. · If nausea and vomiting continues, call your doctor. · Avoid greasy and spicy food today to reduce nausea. PAIN  · Take pain medication as directed by your doctor. · Call your doctor if pain is NOT relieved by medication. · DO NOT take aspirin of blood thinners unless directed by your doctor. · Take pain pills with food to reduce nausea  · Pain pills may cause constipation. May use stool softener    DRESSING CARE May remove dressings in 24 hrs  and wash in running water or shower. Then cover the  incision with Band-aids. Do not submerge in bath or dish water. CALL YOUR DOCTOR IF   · Excessive bleeding that does not stop after holding pressure over the area  · Temperature of 101 degrees F or above  · Excessive redness, swelling or bruising, and/ or green or yellow, smelly discharge from incision    AFTER ANESTHESIA   · For the first 24 hours: DO NOT Drive, Drink alcoholic beverages, or Make important decisions. · Be aware of dizziness following anesthesia and while taking pain medication.      APPOINTMENT DATE/ TIME  8/8/19 at 2:30 PM [facility]    [address] PHONE NUMBER 1 [facility] from Nurse    PATIENT INSTRUCTIONS:    After general anesthesia or intravenous sedation, for 24 hours or while taking prescription Narcotics:  · Limit your activities  · Do not drive and operate hazardous machinery  · Do not make important personal or business decisions  · Do  not drink alcoholic beverages  · If you have not urinated within 8 hours after discharge, please contact your surgeon on call. *  Please give a list of your current medications to your Primary Care Provider. *  Please update this list whenever your medications are discontinued, doses are      changed, or new medications (including over-the-counter products) are added. *  Please carry medication information at all times in case of emergency situations. These are general instructions for a healthy lifestyle:    No smoking/ No tobacco products/ Avoid exposure to second hand smoke    Surgeon General's Warning:  Quitting smoking now greatly reduces serious risk to your health. Obesity, smoking, and sedentary lifestyle greatly increases your risk for illness    A healthy diet, regular physical exercise & weight monitoring are important for maintaining a healthy lifestyle    You may be retaining fluid if you have a history of heart failure or if you experience any of the following symptoms:  Weight gain of 3 pounds or more overnight or 5 pounds in a week, increased swelling in our hands or feet or shortness of breath while lying flat in bed. Please call your doctor as soon as you notice any of these symptoms; do not wait until your next office visit. Recognize signs and symptoms of STROKE:    F-face looks uneven    A-arms unable to move or move unevenly    S-speech slurred or non-existent    T-time-call 911 as soon as signs and symptoms begin-DO NOT go       Back to bed or wait to see if you get better-TIME IS BRAIN.

## 2019-07-30 NOTE — ANESTHESIA PREPROCEDURE EVALUATION
Relevant Problems   No relevant active problems       Anesthetic History   No history of anesthetic complications            Review of Systems / Medical History  Patient summary reviewed and pertinent labs reviewed    Pulmonary  Within defined limits                 Neuro/Psych         Psychiatric history     Cardiovascular                  Exercise tolerance: >4 METS     GI/Hepatic/Renal     GERD: well controlled           Endo/Other             Other Findings              Physical Exam    Airway  Mallampati: II  TM Distance: 4 - 6 cm  Neck ROM: normal range of motion   Mouth opening: Normal     Cardiovascular    Rhythm: regular  Rate: normal         Dental         Pulmonary  Breath sounds clear to auscultation               Abdominal         Other Findings            Anesthetic Plan    ASA: 2  Anesthesia type: total IV anesthesia          Induction: Intravenous  Anesthetic plan and risks discussed with: Patient    Friend present

## 2019-07-30 NOTE — PROGRESS NOTES
's Pre-op visit requested by patient. Conveyed care and concern for patient and family. Offered prayer as requested for patient, family, and staff.     Denita Carrington MDiv, BS  Board Certified

## 2019-07-30 NOTE — INTERVAL H&P NOTE
H&P Update:    Pt is alert and oriented. Chest: Clear w/o SOB. C/V:  RR&R    History and physical has been reviewed. The patient has been examined. There have been no significant clinical changes since the completion of the originally dated History and Physical.  Patient identified by surgeon; surgical site was confirmed by patient and surgeon. The patient is here today for a right trigger thumb release.

## 2019-08-04 NOTE — ANESTHESIA POSTPROCEDURE EVALUATION
Procedure(s):  RIGHT TRIGGER THUMB RELEASE LOCAL W/ MAC.    total IV anesthesia    Anesthesia Post Evaluation      Multimodal analgesia: multimodal analgesia used between 6 hours prior to anesthesia start to PACU discharge  Patient location during evaluation: PACU  Patient participation: complete - patient participated  Level of consciousness: awake and alert  Pain management: adequate  Airway patency: patent  Anesthetic complications: no  Cardiovascular status: acceptable and hemodynamically stable  Respiratory status: acceptable  Hydration status: acceptable        No vitals data found for the desired time range.

## 2019-08-13 ENCOUNTER — HOSPITAL ENCOUNTER (OUTPATIENT)
Dept: PHYSICAL THERAPY | Age: 64
Discharge: HOME | End: 2019-08-13
Payer: COMMERCIAL

## 2019-08-13 PROCEDURE — 97161 PT EVAL LOW COMPLEX 20 MIN: CPT

## 2019-08-13 PROCEDURE — 97110 THERAPEUTIC EXERCISES: CPT

## 2019-08-13 PROCEDURE — 97140 MANUAL THERAPY 1/> REGIONS: CPT

## 2019-08-13 NOTE — PROGRESS NOTES
Chiki Columba  : 1955  Payor: [facility] / Plan: [address] / Product Type: [facility] /  [address] [facility] at [address] [place] [address]. [address], [address], [address] [place] [address]  [phone]   [phone]                                                          Analisa Peterson MD      OUTPATIENT PHYSICAL THERAPY: Daily Treatment Note 2019 Visit Count:  1    Tx Diagnosis   ICD-10: Treatment Diagnosis: Pain in left shoulder (M25.512)    Incomplete rotator cuff tear or rupture of left shoulder, not specified as traumatic (M75.112)   Bicipital tendinitis, left shoulder (M75.22)             Precautions/Allergies:   Levaquin [levofloxacin]; Cellcept [mycophenolate mofetil]; and Other medication   Fall Risk Score: 1 (? 5 = High Risk)  MD Orders: Eval and Treat  MEDICAL/REFERRING DIAGNOSIS:  Bicipital tendinitis, left shoulder [M75.22]  Incomplete rotator cuff tear or rupture of left shoulder, not specified as traumatic [M75.112]   DATE OF ONSET: year and a half ago  REFERRING PHYSICIAN: Analisa Peterson MD  RETURN PHYSICIAN APPOINTMENT: TBD by patient        Pre-treatment Symptoms/Complaints:  None sitting, pain and pulling with reaching    Pain: Initial:   0/10 Post Session:  0/10   Medications Last Reviewed:  2019     Updated Objective Findings:  See initial Evaluation for initial objective measures. TTP biceps tendon/anterior deltoid, posterior scapular muscles  Hypomobile thoracic    TREATMENT:   THERAPEUTIC EXERCISE: (20 minutes):  Exercises per grid below to improve mobility, strength and balance. Required minimal visual, verbal and manual cues to promote proper body alignment and promote proper body posture. Progressed resistance and complexity of movement as indicated.      Date:  19 Date:   Date:     Activity/Exercise Parameters Parameters Parameters   Education Pathology, anatomy, POC, PT goals, HEP     L stretch 5x10\"      Scapular retraction 5x10\" green     IR isometric 5x10\" green     ER isometric 5x10\" green                       MANUAL THERAPY: (10 minutes): Joint mobilization, Soft tissue mobilization was utilized and necessary because of the patient's restricted joint motion and restricted motion of soft tissue mobility.  Strumming/release along biceps and anterior deltoid   Strumming along posterior scapular muscles   Grade II-III P/A thoracic joint mobs        MedBridge Portal  Treatment/Session Summary:    · Response to Treatment:  Pt demonstrated understanding of POC and initial HEP. · Communication/Consultation:  POC, HEP, PT goals   · Equipment provided today:  HEP handout     Recommendations/Intent for next treatment session: Next visit will focus on manual interventions as indicated and functional strengthening and RTC strengthening. Treatment Plan of Care Effective Dates: 8/13/19 TO 11/11/2019 (90 days).   Frequency/Duration: 2 times a week for 90 Days      Total Treatment Billable Duration:  50 mins; 20 therex, 10 manual, 20 eval  PT Patient Time In/Time Out  Time In: 1550  Time Out: [facility]    Future Appointments   Date Time Provider Manisha Alfaro   8/21/2019  3:30 PM Christiana Nugent [facility] AND [facility]   8/26/2019  4:00 PM Waldemar Leone N [facility]   8/29/2019  4:00 PM Waldemar Leone N [facility]   9/3/2019  4:15 PM Waldemar Leone N [facility]   9/5/2019  4:00 PM Waldemar Leone N [facility]   9/9/2019  4:00 PM Christiana Nugent [facility]

## 2019-08-13 NOTE — THERAPY EVALUATION
Artem Ramirez  : 1955      Payor: [facility] / Plan: [address] / Product Type: PPO /    Jana Stephens at [address], [place], [address] [place] [address] Road  [phone]   [phone]              OUTPATIENT PHYSICAL THERAPY:Initial Assessment 2019    ICD-10: Treatment Diagnosis: Pain in left shoulder (M25.512)    Incomplete rotator cuff tear or rupture of left shoulder, not specified as traumatic (M75.112)   Bicipital tendinitis, left shoulder (M75.22)             Precautions/Allergies:   Levaquin [levofloxacin]; Cellcept [mycophenolate mofetil]; and Other medication   Fall Risk Score: 1 (? 5 = High Risk)  MD Orders: Eval and Treat  MEDICAL/REFERRING DIAGNOSIS:  Bicipital tendinitis, left shoulder [M75.22]  Incomplete rotator cuff tear or rupture of left shoulder, not specified as traumatic [M75.112]   DATE OF ONSET: year and a half ago  REFERRING PHYSICIAN: Abdi Bledsoe MD  RETURN PHYSICIAN APPOINTMENT: TBD by patient      INITIAL ASSESSMENT:   Ms. Patrick Ibrahim presents to physical therapy with tenderness along RTC muscles, decreased L shoulder strength, ROM, joint mobility, functional mobility. These S/S are consistent with L RTC tear and biceps tendinitis. Patient will benefit from skilled physical therapy for manual therapeutic techniques (as appropriate), therapeutic exercises and activities, neuromuscular re-education, and comprehensive home exercises program to address current impairments and functional limitations. Artem Ramirez will benefit from skilled PT (medically necessary) in order to address above deficits affecting participation in basic ADLs and overall functional tolerance.     PROBLEM LIST (Impacting functional limitations):  · Decreased Strength  · Decreased ADL/Functional Activities  · Increased Pain  · Decreased Activity Tolerance  · Decreased Flexibility/Joint Mobility  · Decreased [place] with Home Exercise Program INTERVENTIONS PLANNED:  1. Cold  2. Heat  3. Family Education  4. Home Exercise Program (HEP)  5. Manual Therapy (TDN)  6. Neuromuscular Re-education/Strengthening  7. Range of Motion (ROM)  8. Therapeutic Activites  9. Therapeutic Exercise/Strengthening  10. Transfer Training  11. Ultrasound   TREATMENT PLAN:  Effective Dates: 8/13/19 TO 11/11/2019 (90 days). Frequency/Duration: 2 times a week for 90 Days  GOALS: (Goals have been discussed and agreed upon with patient.)  SHORT-TERM FUNCTIONAL GOALS: Time Frame: 4 weeks  1. Henrry Du will report <=4/10 pain with don/doffing clothing as well as minimal/no difficulty. 2. Henrry Du will demonstrate improvement in active shoulder flexion to >150 degrees to increase UE function and participation in ADLs. 3. Henrry Du will demonstrate demonstrate improvement in active shoulder abduction to >140 degrees to increase UE function and participation in ADLs. [address] [facility] will show a greater than 8 point decrease on the DASH in order to show an increase in upper extremity function. 5. Adwoa Cervantes will be independent in all HEP    DISCHARGE GOALS: Time Frame: 8 weeks    1. Henrry Du will show full ROM of the UE in order to return to full functional mobility   2. Henrry Du will show a greater than 15 point decrease on the DASH in order to show an increase in upper extremity function  3. Henrry Du will report doing hair/bathing without difficulty and <=2/10 pain in order to be independent with ADL's  4. Henrry Du will be independent in all advanced HEP     Rehabilitation Potential For Stated Goals: GOOD  Regarding Adwoa Mcghee's therapy, I certify that the treatment plan above will be carried out by a therapist or under their direction.   Thank you for this referral,  Geoff Hooper     Referring Physician Signature: Nancy Villar MD              Date                    HISTORY:   History of Present Injury/Illness (Reason for Referral):  Letty Childs states after about a year and a half her MD did a MRI on her L shoulder after she injured it and revealed a couple small tears in her rotator cuff. Pt states previous PT last summer that did not seem to help very much. She states her shoulder was getting better gradually but her dog jumped up on her and kind of puller her shoulder pretty bad. She states her doctor told her that her tear is not big enough for surgery but needs PT for strengthening.     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 Nba Travis  has a past medical history of Allergic rhinitis, cause unspecified (2015), Anxiety, Anxiety, BPPV (benign paroxysmal positional vertigo), Chronic pain, Closed fracture of medial malleolus, Enthesopathy of ankle and tarsus, unspecified, Esophageal reflux (2015), GERD (gastroesophageal reflux disease), Gestational diabetes (), Hallux valgus (acquired), and History of gestational diabetes. Ms. Nba Travis  has a past surgical history that includes hx bunionectomy (Right); hx  section (, ); hx appendectomy (); hx endoscopy (2017); hx colonoscopy (2017); hx abdominal wall defect repair; hx orthopaedic (Left, ); and hx orthopaedic (Right, early ). Trigger finger release of thumb ().    Social History/Living Environment:     lives alone in single story home  Prior Level of Function/Work/Activity:  Full-time     Previous treatment approach: chiropractor currently for manual release and PT last summer that did not help at [facility] due to inconsistent     Current Medications:    Current Outpatient Medications:     meclizine (ANTIVERT) 25 mg tablet, Take 1 Tab by mouth daily as needed for Dizziness. , Disp: 20 Tab, Rfl: 5    cyanocobalamin (VITAMIN B-12) 1,000 mcg sublingual tablet, Take 1,000 mcg by mouth daily. , Disp: , Rfl:     cholecalciferol (VITAMIN D3) 400 unit tab tablet, Take  by mouth daily. , Disp: , Rfl:     ARCH SUPPORT ORTHOTIC misc, Full sole orthodics and extra depth shoes, Disp: 1 Each, Rfl: 0    aspirin delayed-release 81 mg tablet, Take  by mouth daily. , Disp: , Rfl:     b complex vitamins tablet, Take 1 Tab by mouth daily. , Disp: , Rfl:     busPIRone (BUSPAR) 10 mg tablet, Take 1 Tab by mouth three (3) times daily as needed. (Patient taking differently: Take 10 mg by mouth three (3) times daily as needed. Patient takes 5 mg , prn), Disp: 60 Tab, Rfl: 0    ascorbic acid, vitamin C, (VITAMIN C) 500 mg tablet, Take  by mouth., Disp: , Rfl:     multivitamin (ONE A DAY) tablet, Take 1 Tab by mouth daily. , Disp: , Rfl:     Biotin 2,500 mcg cap, Take  by mouth., Disp: , Rfl:     potassium 99 mg tablet, Take 99 mg by mouth daily. , Disp: , Rfl:     Cetirizine 10 mg cap, Take  by mouth., Disp: , Rfl:     OTHER, Indications: Zatador eye drops as needed, Disp: , Rfl:     fluticasone (FLONASE) 50 mcg/actuation nasal spray, daily. , Disp: , Rfl:     ranitidine (ZANTAC) 150 mg tablet, Take 150 mg by mouth daily as needed for Indigestion. , Disp: , Rfl:     SOY ISOFLA/BLK COHOSH/MAG BARK (ESTROVEN PO), Take 1 Tab by mouth daily. , Disp: , Rfl:         Ambulatory/Rehab Services H2 Model Falls Risk Assessment    Risk Factors:       (1)  Dizziness/Vertigo Ability to Rise from Chair:       (0)  Ability to rise in a single movement    Falls Prevention Plan:       No modifications necessary   Total: (5 or greater = High Risk): 1    ©2010 [employer] [address] [address]. [facility] Patent #[id number].  Federal Law prohibits the replication, distribution or use without written permission from [facility] [employer]       Date Last Reviewed:  8/13/2019   Number of Personal Factors/Comorbidities that affect the Plan of Care: 1-2: MODERATE COMPLEXITY   EXAMINATION:   Observation/Orthostatic Postural Assessment:          Rounded shoulders  Palpation:          TTP posterior scapular mms, biceps, anterior deltoid  ROM:    AROM/PROM         Joint: Eval Date: 8/13/19  Re-Assess Date:  Re-Assess Date:    ACTIVE ROM (standing) RIGHT LEFT RIGHT LEFT RIGHT LEFT   Shoulder Flexion 170 134       Shoulder Abduction 174 110       Shoulder Internal Rotation (Apley's) T4 T7       Shoulder External Rotation (Apley's) T3 T2       Elbow ROM         PASSIVE ROM (supine)         Shoulder Flexion         Shoulder Abduction         Shoulder Internal Rotation         Shoulder External Rotation                                      Strength:    Joint: Eval Date: 8/13/19  Re-Assess Date:  Re-Assess Date:     RIGHT LEFT RIGHT LEFT RIGHT LEFT   Shoulder Flexion 4+/5 3/5       Shoulder Abduction  (C5) 4+/5 3-/5       Shoulder Internal Rotation 5/5 4-/5       Shoulder External Rotation 5/5 4-/5       Elbow Flexion  (C6) 5/5 4/5       Elbow Extension (C7)         Wrist Flexion (C7)         Wrist Extension (C6)         Resisted Thumb Extension/Finger Abduction (C8/T1)         Resisted Cervical Rotation (C1):         Resisted Shoulder Shrug (C2, 3, 4):           Strength                                      Joint Mobility Eval Date: 8/13/19  Re-Assess Date:  Re-Assess Date:     RIGHT LEFT RIGHT LEFT RIGHT LEFT   Glenohumeral  Hypomobile        Scapulothoracic         Thoracic  hypomobile            Special Tests:    Impingement tests:   [place] test: +   Neer test: -   Horizontal adduction test: + in back of shoulder  Biceps brachii tests:   TXU Jill test:+       Neurological Screen: Assessed @ Initial Visit    Radiating symptoms? NO  Functional Mobility:  Assessed @ Initial Visit; limited with behind the back and overhead reaching  Balance:  Assessed @ Initial Visit      Body Structures Involved:  1. Bones  2. Joints  3. Muscles  4. Ligaments Body Functions Affected:  1. Sensory/Pain  2. Neuromusculoskeletal  3. Movement Related Activities and Participation Affected:  1. Mobility  2. Self Care   Number of elements that affect the Plan of Care: 1-2: LOW COMPLEXITY   CLINICAL PRESENTATION:   Presentation: Stable and uncomplicated: LOW COMPLEXITY   CLINICAL DECISION MAKING:   Outcome Measure: Tool Used: Disabilities of the Arm, Shoulder and Hand (DASH) Questionnaire - Quick Version  Score:  Initial: 28/55  Most Recent: X/55 (Date: -- )   Interpretation of Score: The DASH is designed to measure the activities of daily living in person's with upper extremity dysfunction or pain. Each section is scored on a 1-5 scale, 5 representing the greatest disability. The scores of each section are added together for a total score of 55. Score 11 12-19 20-28 29-37 38-45 46-54 55   Modifier CH CI CJ CK CL CM CN     ? Carrying, Moving, and Handling Objects:     - CURRENT STATUS: CK - 40%-59% impaired, limited or restricted    - GOAL STATUS: CI - 1%-19% impaired, limited or restricted    - D/C STATUS:  ---------------To be determined---------------    Medical Necessity:   · Skilled intervention continues to be required due to deficits and impairments seen upon initial evaluation affecting patient's participation in ADLs and functional tasks. ·   Reason for Services/Other Comments:  · Patient continues to require skilled intervention due to deficits and impairments seen upon initial evaluation affecting patient's participation in ADLs and functional tasks.    Use of outcome tool(s) and clinical judgement create a POC that gives a: Clear prediction of patient's progress: LOW COMPLEXITY     See associated treatment note for treatment provided today.     Future Appointments   Date Time Provider Manisha Alfaro   8/21/2019  3:30 PM Cristhian Huertas [facility]   8/26/2019  4:00 PM Cinderella Bristol [facility]   8/29/2019  4:00 PM Cinderella Bristol N [facility]   9/3/2019  4:15 PM Cinderella Bristol N [facility]   9/5/2019  4:00 PM Cinderella [place] [facility]   9/9/2019  4:00 PM [facility]       Total Treatment Duration: 50 mins; 20 therex, 10 manual, 20 eval  PT Patient Time In/Time Out  Time In: 1550  Time Out: 1640    Blanca Box

## 2019-08-21 ENCOUNTER — HOSPITAL ENCOUNTER (OUTPATIENT)
Dept: PHYSICAL THERAPY | Age: 64
Discharge: HOME | End: 2019-08-21
Payer: COMMERCIAL

## 2019-08-21 ENCOUNTER — APPOINTMENT (OUTPATIENT)
Dept: PHYSICAL THERAPY | Age: 64
End: 2019-08-21
Payer: COMMERCIAL

## 2019-08-21 PROCEDURE — 97140 MANUAL THERAPY 1/> REGIONS: CPT

## 2019-08-21 PROCEDURE — 97110 THERAPEUTIC EXERCISES: CPT

## 2019-08-21 NOTE — PROGRESS NOTES
Pascual Funes  : 1955  Payor: [facility] / Plan: [id number] / Product Type: O /  [address] [facility] at [address] [place] [address]. [facility], [address], [address] [place] [address]  [phone]   [phone]                                                          Madi Diamond Neyda Aguirre MD      OUTPATIENT PHYSICAL THERAPY: Daily Treatment Note 2019 Visit Count:  2    Tx Diagnosis   ICD-10: Treatment Diagnosis: Pain in left shoulder (M25.512)    Incomplete rotator cuff tear or rupture of left shoulder, not specified as traumatic (M75.112)   Bicipital tendinitis, left shoulder (M75.22)             Precautions/Allergies:   Levaquin [levofloxacin]; Cellcept [mycophenolate mofetil]; and Other medication   Fall Risk Score: 1 (? 5 = High Risk)  MD Orders: Eval and Treat  MEDICAL/REFERRING DIAGNOSIS:  Bicipital tendinitis, left shoulder [M75.22]  Incomplete rotator cuff tear or rupture of left shoulder, not specified as traumatic [M75.112]   DATE OF ONSET: year and a half ago  REFERRING PHYSICIAN: Tracie Dai MD  RETURN PHYSICIAN APPOINTMENT: TBD by patient        Pre-treatment Symptoms/Complaints:  Doing ok. Exercises going well    Pain: Initial:   0/10 Post Session:  0/10   Medications Last Reviewed:  2019     Updated Objective Findings:  See initial Evaluation for initial objective measures. TTP biceps tendon/anterior deltoid, posterior scapular muscles  Hypomobile thoracic    TREATMENT:   THERAPEUTIC EXERCISE: (15 minutes):  Exercises per grid below to improve mobility, strength and balance. Required minimal visual, verbal and manual cues to promote proper body alignment and promote proper body posture. Progressed resistance and complexity of movement as indicated.      Date:  19 Date:  19 Date:     Activity/Exercise Parameters Parameters Parameters   Education Pathology, anatomy, POC, PT goals, HEP     L stretch 5x10\"  5x10\"    Scapular retraction 5x10\" green 20x5\" 17#    IR isometric 5x10\" green 5x20\" red and blue    ER isometric 5x10\" green 5x20\" red    Bicep ecc   4# 15x    SA punch  2x10          MANUAL THERAPY: (30 minutes): Joint mobilization, Soft tissue mobilization was utilized and necessary because of the patient's restricted joint motion and restricted motion of soft tissue mobility.  Strumming/release along biceps and anterior deltoid   Strumming along posterior scapular muscles   Grade II oscillations quad 2        MedBridge Portal  Treatment/Session Summary:    · Response to Treatment:  Pt very tender along biceps and posterior scapular mms, Good response to exercises, no increase in pain. · Communication/Consultation:  HEP  · Equipment provided today:  none    Recommendations/Intent for next treatment session: Next visit will focus on manual interventions as indicated and functional strengthening and RTC strengthening. Treatment Plan of Care Effective Dates: 8/13/19 TO 11/11/2019 (90 days).   Frequency/Duration: 2 times a week for 90 Days      Total Treatment Billable Duration:  45 mins  Arleta Showers   PT Patient Time In/Time Out  Time In: 1530  Time Out: 1615      Future Appointments   Date Time Provider Manisha Alfaro   8/26/2019  4:00 PM [facility]   8/29/2019  4:00 PM Dalia Aby N [facility]   9/3/2019  4:15 PM Dalia Aby N [facility]   9/5/2019  4:00 PM Dalia Aby N [facility]   9/9/2019  4:00 PM [facility]

## 2019-08-26 ENCOUNTER — HOSPITAL ENCOUNTER (OUTPATIENT)
Dept: PHYSICAL THERAPY | Age: 64
Discharge: HOME | End: 2019-08-26
Payer: COMMERCIAL

## 2019-08-26 PROCEDURE — 97140 MANUAL THERAPY 1/> REGIONS: CPT

## 2019-08-26 PROCEDURE — 97110 THERAPEUTIC EXERCISES: CPT

## 2019-08-26 NOTE — PROGRESS NOTES
Mohan Solano  : 1955  Payor: [facility] / Plan: [id number] / Product Type: [facility] /  [address] [facility] at [address] [place] [address]. [facility], [address], [address] [place] [address]  [phone]   [phone]                                                          Siobhan Farnsworth MD      OUTPATIENT PHYSICAL THERAPY: Daily Treatment Note 2019 Visit Count:  3    Tx Diagnosis   ICD-10: Treatment Diagnosis: Pain in left shoulder (M25.512)    Incomplete rotator cuff tear or rupture of left shoulder, not specified as traumatic (M75.112)   Bicipital tendinitis, left shoulder (M75.22)             Precautions/Allergies:   Levaquin [levofloxacin]; Cellcept [mycophenolate mofetil]; and Other medication   Fall Risk Score: 1 (? 5 = High Risk)  MD Orders: Eval and Treat  MEDICAL/REFERRING DIAGNOSIS:  Bicipital tendinitis, left shoulder [M75.22]  Incomplete rotator cuff tear or rupture of left shoulder, not specified as traumatic [M75.112]   DATE OF ONSET: year and a half ago  REFERRING PHYSICIAN: Robetr Alvarez MD  RETURN PHYSICIAN APPOINTMENT: TBD by patient        Pre-treatment Symptoms/Complaints:  Doing ok. Exercises going well    Pain: Initial:   0/10 Post Session:  0/10   Medications Last Reviewed:  2019     Updated Objective Findings:  See initial Evaluation for initial objective measures. TTP biceps tendon/anterior deltoid, posterior scapular muscles  Hypomobile thoracic    TREATMENT:   THERAPEUTIC EXERCISE: (15 minutes):  Exercises per grid below to improve mobility, strength and balance. Required minimal visual, verbal and manual cues to promote proper body alignment and promote proper body posture. Progressed resistance and complexity of movement as indicated.      Date:  19 Date:  19 Date:  19   Activity/Exercise Parameters Parameters Parameters   Education Pathology, anatomy, POC, PT goals, HEP     L stretch 5x10\"  5x10\" 5x10\" Scapular retraction 5x10\" green 20x5\" 17# 20x5\" 17#   IR isometric 5x10\" green 5x20\" red and blue 5x20\" red and blue   ER isometric 5x10\" green 5x20\" red 5x20\" green   Bicep ecc   4# 15x 5# 10x   SA punch  2x10 15x   Shrugs   15x 8#   Ball circles   20xB               MANUAL THERAPY: (25 minutes): Joint mobilization, Soft tissue mobilization was utilized and necessary because of the patient's restricted joint motion and restricted motion of soft tissue mobility.  Strumming/release along biceps and anterior deltoid   Strumming along posterior scapular muscles   Grade II oscillations quad 2        MedBridge Portal  Treatment/Session Summary:    · Response to Treatment:  Pt did well today with progression. No significant increase in pain. · Communication/Consultation:  HEP  · Equipment provided today:  none    Recommendations/Intent for next treatment session: Next visit will focus on manual interventions as indicated and functional strengthening and RTC strengthening. Treatment Plan of Care Effective Dates: 8/13/19 TO 11/11/2019 (90 days).   Frequency/Duration: 2 times a week for 90 Days      Total Treatment Billable Duration:  40 mins  Glorine Breath   PT Patient Time In/Time Out  Time In: 1615  Time Out: 1700      Future Appointments   Date Time Provider Manisha Alfaro   8/29/2019  4:00 PM [facility]   9/3/2019  4:15 PM Uriel HONG [facility]   9/5/2019  4:00 PM Uriel HONG [facility]   9/9/2019  4:00 PM [facility]

## 2019-08-28 NOTE — PROGRESS NOTES
Valeria Hooper  : 1955  Payor: [facility] / Plan: [id number] / Product Type: [facility] /  [address] [facility] at [address] [place] [address]. [address], [address] [place] [address]  [phone]   [phone]                                                          Kelvin Neves MD      OUTPATIENT PHYSICAL THERAPY: Daily Treatment Note 2019 Visit Count:  4    Tx Diagnosis   ICD-10: Treatment Diagnosis: Pain in left shoulder (M25.512)    Incomplete rotator cuff tear or rupture of left shoulder, not specified as traumatic (M75.112)   Bicipital tendinitis, left shoulder (M75.22)             Precautions/Allergies:   Levaquin [levofloxacin]; Cellcept [mycophenolate mofetil]; and Other medication   Fall Risk Score: 1 (? 5 = High Risk)  MD Orders: Eval and Treat  MEDICAL/REFERRING DIAGNOSIS:  Bicipital tendinitis, left shoulder [M75.22]  Incomplete rotator cuff tear or rupture of left shoulder, not specified as traumatic [M75.112]   DATE OF ONSET: year and a half ago  REFERRING PHYSICIAN: Jen Spence MD  RETURN PHYSICIAN APPOINTMENT: TBD by patient        Pre-treatment Symptoms/Complaints:  Got a massage yesterday and it seemed to help a lot and was able to reach up high. Pain: Initial:   0/10 Post Session:  0/10   Medications Last Reviewed:  2019     Updated Objective Findings:  See initial Evaluation for initial objective measures. TTP biceps tendon/anterior deltoid, posterior scapular muscles  Hypomobile thoracic    TREATMENT:   THERAPEUTIC EXERCISE: (40 minutes):  Exercises per grid below to improve mobility, strength and balance. Required minimal visual, verbal and manual cues to promote proper body alignment and promote proper body posture. Progressed resistance and complexity of movement as indicated.      Date:  19 Date:  19 Date:  19 Date  19   Activity/Exercise Parameters Parameters Parameters    Education Pathology, anatomy, POC, PT goals, HEP      L stretch 5x10\"  5x10\" 5x10\" 10x10\"   Scapular retraction 5x10\" green 20x5\" 17# 20x5\" 17# 20x5\" 17#   IR isometric 5x10\" green 5x20\" red and blue 5x20\" red and blue    ER isometric 5x10\" green 5x20\" red 5x20\" green    Bicep ecc   4# 15x 5# 10x 5# 15x   SA punch  2x10 15x    Shrugs   15x 8# 30x8#   Ball circles   20xB 30xB   Body blade    2x 1 min   UBE    3F/3B level 5   Prone I, Y, T    10x5\"   IR/ER    Red band 20x5\" each         MANUAL THERAPY: (0 minutes): Joint mobilization, Soft tissue mobilization was utilized and necessary because of the patient's restricted joint motion and restricted motion of soft tissue mobility.  Strumming/release along biceps and anterior deltoid   Strumming along posterior scapular muscles   Grade II oscillations quad 2        MedBridge Portal  Treatment/Session Summary:    · Response to Treatment:  Pt did well today with progression of strengthening exercises. No significant increase in pain. · Communication/Consultation:  HEP  · Equipment provided today:  none    Recommendations/Intent for next treatment session: Next visit will focus on manual interventions as indicated and functional strengthening and RTC strengthening. Treatment Plan of Care Effective Dates: 8/13/19 TO 11/11/2019 (90 days).   Frequency/Duration: 2 times a week for 90 Days      Total Treatment Billable Duration:  40 mins  Camryn Villafana   PT Patient Time In/Time Out  Time In: 5716  Time Out: 2695      Future Appointments   Date Time Provider Manisha Alfaro   9/3/2019  4:15 PM Asif Lópeze [facility]   9/5/2019  4:00 PM Vera HONG [facility]   9/9/2019  4:00 PM Asif Lópeze [facility]

## 2019-08-29 ENCOUNTER — HOSPITAL ENCOUNTER (OUTPATIENT)
Dept: PHYSICAL THERAPY | Age: 64
Discharge: HOME | End: 2019-08-29
Payer: COMMERCIAL

## 2019-08-29 PROCEDURE — 97110 THERAPEUTIC EXERCISES: CPT

## 2019-09-03 ENCOUNTER — HOSPITAL ENCOUNTER (OUTPATIENT)
Dept: PHYSICAL THERAPY | Age: 64
Discharge: HOME | End: 2019-09-03
Payer: COMMERCIAL

## 2019-09-03 PROCEDURE — 97110 THERAPEUTIC EXERCISES: CPT

## 2019-09-03 PROCEDURE — 97140 MANUAL THERAPY 1/> REGIONS: CPT

## 2019-09-03 NOTE — PROGRESS NOTES
Letty Childs  : 1955  Payor: [facility] / Plan: [address] / Product Type: PPO /  Michael Flanagan at [address]. [facility]., [address], [address] [place] [address]  [phone]   [phone]                                                          Cheri Curtis MD      OUTPATIENT PHYSICAL THERAPY: Daily Treatment Note 9/3/2019 Visit Count:  5    Tx Diagnosis   ICD-10: Treatment Diagnosis: Pain in left shoulder (M25.512)    Incomplete rotator cuff tear or rupture of left shoulder, not specified as traumatic (M75.112)   Bicipital tendinitis, left shoulder (M75.22)             Precautions/Allergies:   Levaquin [levofloxacin]; Cellcept [mycophenolate mofetil]; and Other medication   Fall Risk Score: 1 (? 5 = High Risk)  MD Orders: Eval and Treat  MEDICAL/REFERRING DIAGNOSIS:  Bicipital tendinitis, left shoulder [M75.22]  Incomplete rotator cuff tear or rupture of left shoulder, not specified as traumatic [M75.112]   DATE OF ONSET: year and a half ago  REFERRING PHYSICIAN: Nancy Villar MD  RETURN PHYSICIAN APPOINTMENT: TBD by patient        Pre-treatment Symptoms/Complaints:  Sore and tired. Was not able to do the exercises laying down due to busy weekend. Able to put something on a shelf the other day which was good  Pain: Initial:   0/10 Post Session:  0/10   Medications Last Reviewed:  2019     Updated Objective Findings:  See initial Evaluation for initial objective measures. TTP biceps tendon/anterior deltoid, posterior scapular muscles  Hypomobile thoracic    TREATMENT:   THERAPEUTIC EXERCISE: (32 minutes):  Exercises per grid below to improve mobility, strength and balance. Required minimal visual, verbal and manual cues to promote proper body alignment and promote proper body posture. Progressed resistance and complexity of movement as indicated.      Date:  19 Date:  19 Date:  19 Date  19 Date  9/3/19 Activity/Exercise Parameters Parameters Parameters     Education Pathology, anatomy, POC, PT goals, HEP       L stretch 5x10\"  5x10\" 5x10\" 10x10\" 5x20\"   Scapular retraction 5x10\" green 20x5\" 17# 20x5\" 17# 20x5\" 17#    IR isometric 5x10\" green 5x20\" red and blue 5x20\" red and blue     ER isometric 5x10\" green 5x20\" red 5x20\" green     Bicep ecc   4# 15x 5# 10x 5# 15x    SA punch  2x10 15x  15x against wall   Shrugs   15x 8# 30x8#    Ball circles   20xB 30xB 30x B    Body blade    2x 1 min 2x 1 min   UBE    3F/3B level 5 3F/3B level 7   Prone I,  T    10x5\" 10x5\" each   IR/ER    Red band 20x5\" each    Scap retract with ER     10x10\" red                 MANUAL THERAPY: (8 minutes): Joint mobilization, Soft tissue mobilization was utilized and necessary because of the patient's restricted joint motion and restricted motion of soft tissue mobility.  Strumming/release along biceps and anterior deltoid   Strumming along posterior scapular muscles ( NOT TODAY)   Grade II oscillations quad 2        MedBridge Portal  Treatment/Session Summary:    · Response to Treatment:  Pt did well today with prone scapular strengthening. No significant pain. · Communication/Consultation:  HEP  · Equipment provided today:  none    Recommendations/Intent for next treatment session: Next visit will focus on manual interventions as indicated and functional strengthening and RTC strengthening. Treatment Plan of Care Effective Dates: 8/13/19 TO 11/11/2019 (90 days).   Frequency/Duration: 2 times a week for 90 Days      Total Treatment Billable Duration:  40 mins  Robert Menchaca   PT Patient Time In/Time Out  Time In: 9133  Time Out: 6452      Future Appointments   Date Time Provider Manisha Alfaro   9/5/2019  4:00 PM [facility]   9/9/2019  4:00 PM [facility]

## 2019-09-05 ENCOUNTER — HOSPITAL ENCOUNTER (OUTPATIENT)
Dept: PHYSICAL THERAPY | Age: 64
Discharge: HOME | End: 2019-09-05
Payer: COMMERCIAL

## 2019-09-05 PROCEDURE — 97110 THERAPEUTIC EXERCISES: CPT

## 2019-09-05 NOTE — PROGRESS NOTES
Paula Feeling  : 1955  Payor: [facility] / Plan: [address] / Product Type: [facility] /  [address] [facility] at [address] [place] [address]. [facility], [address], [address] [place] [address]  [phone]   [phone]                                                          Jennifer Medina MD      OUTPATIENT PHYSICAL THERAPY: Daily Treatment Note 2019 Visit Count:  6    Tx Diagnosis   ICD-10: Treatment Diagnosis: Pain in left shoulder (M25.512)    Incomplete rotator cuff tear or rupture of left shoulder, not specified as traumatic (M75.112)   Bicipital tendinitis, left shoulder (M75.22)             Precautions/Allergies:   Levaquin [levofloxacin]; Cellcept [mycophenolate mofetil]; and Other medication   Fall Risk Score: 1 (? 5 = High Risk)  MD Orders: Eval and Treat  MEDICAL/REFERRING DIAGNOSIS:  Bicipital tendinitis, left shoulder [M75.22]  Incomplete rotator cuff tear or rupture of left shoulder, not specified as traumatic [M75.112]   DATE OF ONSET: year and a half ago  REFERRING PHYSICIAN: Rosemarie Germain MD  RETURN PHYSICIAN APPOINTMENT: TBD by patient        Pre-treatment Symptoms/Complaints:  Saw MD and he asked if I wanted to keep coming and I said I did because it is helping get my arm stronger. Also wandering about getting a gym membership. Pt arrived late due to had to  her puppy from vet. Pain: Initial:   0/10 Post Session:  0/10   Medications Last Reviewed:  2019     Updated Objective Findings:  See initial Evaluation for initial objective measures. TTP biceps tendon/anterior deltoid, posterior scapular muscles  Hypomobile thoracic    TREATMENT:   THERAPEUTIC EXERCISE: (38 minutes):  Exercises per grid below to improve mobility, strength and balance. Required minimal visual, verbal and manual cues to promote proper body alignment and promote proper body posture. Progressed resistance and complexity of movement as indicated. Date:  8/13/19 Date:  8/21/19 Date:  8/26/19 Date  8/29/19 Date  9/3/19 Date  9/5/19   Activity/Exercise Parameters Parameters Parameters      Education Pathology, anatomy, POC, PT goals, HEP        L stretch 5x10\"  5x10\" 5x10\" 10x10\" 5x20\" 5x20\"   Scapular retraction 5x10\" green 20x5\" 17# 20x5\" 17# 20x5\" 17#  Ext-20# 20x   IR isometric 5x10\" green 5x20\" red and blue 5x20\" red and blue      ER isometric 5x10\" green 5x20\" red 5x20\" green      Bicep ecc   4# 15x 5# 10x 5# 15x     SA punch  2x10 15x  15x against wall    Shrugs   15x 8# 30x8#     Ball circles   20xB 30xB 30x B     Body blade    2x 1 min 2x 1 min 2x1min horizontal and vertical   UBE    3F/3B level 5 3F/3B level 7 3F/3B level 7   Prone I,  T    10x5\" 10x5\" each 10x5\" 2#   IR/ER    Red band 20x5\" each     Scap retract with ER     10x10\" red Green 10x10\"   Bicep curls      5# 10x-10\" hold 10x 10\" hold   tricep pull downs      13# 20x                                    MANUAL THERAPY: (0 minutes): Joint mobilization, Soft tissue mobilization was utilized and necessary because of the patient's restricted joint motion and restricted motion of soft tissue mobility.  Strumming/release along biceps and anterior deltoid   Strumming along posterior scapular muscles ( NOT TODAY)   Grade II oscillations quad 2        MedBridge Portal  Treatment/Session Summary:    · Response to Treatment:  Pt did well today with progressed strengthening. No increase in pain. · Communication/Consultation:  HEP and gyms in town and getting on regiment   · Equipment provided today:  none    Recommendations/Intent for next treatment session: Next visit will focus on manual interventions as indicated and functional strengthening and RTC strengthening. Treatment Plan of Care Effective Dates: 8/13/19 TO 11/11/2019 (90 days).   Frequency/Duration: 2 times a week for 90 Days      Total Treatment Billable Duration:  38 mins  Robert Menchaca   PT Patient Time In/Time Out  Time In: 1620  Time Out: 1700      Future Appointments   Date Time Provider Manisha Alfaro   9/9/2019  4:15 PM [facility] [place] [facility]   9/25/2019  4:00 PM Henry Denise N [facility]   9/30/2019  4:00 PM Henry Denise N [facility]   10/2/2019  4:00 PM Henry Denise N [facility]   10/7/2019  4:00 PM Henry Denise N [facility]   10/9/2019  4:00 PM Henry Denise N [facility]   10/14/2019  4:00 PM Henry Denise N [facility]   10/17/2019 11:15 AM Dionijean pierre Randhawa [facility]

## 2019-09-09 ENCOUNTER — HOSPITAL ENCOUNTER (OUTPATIENT)
Dept: PHYSICAL THERAPY | Age: 64
Discharge: HOME | End: 2019-09-09
Payer: COMMERCIAL

## 2019-09-09 PROCEDURE — 97110 THERAPEUTIC EXERCISES: CPT

## 2019-09-09 NOTE — PROGRESS NOTES
Tiffanie Sawyer  : 1955  Payor: [facility] / Plan: [address] / Product Type: [facility] /  [address] [facility] at [address] [place] [address]. [address], [address] [place] [address]  [phone]   [phone]                                                          Nelsy Pellet Karole Schilder, MD      OUTPATIENT PHYSICAL THERAPY: Daily Treatment Note 2019 Visit Count:  7    Tx Diagnosis   ICD-10: Treatment Diagnosis: Pain in left shoulder (M25.512)    Incomplete rotator cuff tear or rupture of left shoulder, not specified as traumatic (M75.112)   Bicipital tendinitis, left shoulder (M75.22)             Precautions/Allergies:   Levaquin [levofloxacin]; Cellcept [mycophenolate mofetil]; and Other medication   Fall Risk Score: 1 (? 5 = High Risk)  MD Orders: Eval and Treat  MEDICAL/REFERRING DIAGNOSIS:  Bicipital tendinitis, left shoulder [M75.22]  Incomplete rotator cuff tear or rupture of left shoulder, not specified as traumatic [M75.112]   DATE OF ONSET: year and a half ago  REFERRING PHYSICIAN: Leticia Carrillo MD  RETURN PHYSICIAN APPOINTMENT: TBD by patient        Pre-treatment Symptoms/Complaints:  A little sore and tight but noticing more ROM  Pain: Initial:   0/10 Post Session:  0/10   Medications Last Reviewed:  2019     Updated Objective Findings:  See initial Evaluation for initial objective measures. TTP biceps tendon/anterior deltoid, posterior scapular muscles  Hypomobile thoracic    TREATMENT:   THERAPEUTIC EXERCISE: (38 minutes):  Exercises per grid below to improve mobility, strength and balance. Required minimal visual, verbal and manual cues to promote proper body alignment and promote proper body posture. Progressed resistance and complexity of movement as indicated.      Date:  19 Date:  19 Date:  19 Date  19 Date  9/3/19 Date  19 Date  19   Activity/Exercise Parameters Parameters Parameters       Education Pathology, anatomy, POC, PT goals, HEP         L stretch 5x10\"  5x10\" 5x10\" 10x10\" 5x20\" 5x20\" 5x20\"   Scapular retraction 5x10\" green 20x5\" 17# 20x5\" 17# 20x5\" 17#  Ext-20# 20x Ext-20# 20x   IR isometric 5x10\" green 5x20\" red and blue 5x20\" red and blue       ER isometric 5x10\" green 5x20\" red 5x20\" green       Bicep ecc   4# 15x 5# 10x 5# 15x      SA punch  2x10 15x  15x against wall     Shrugs   15x 8# 30x8#      Ball circles   20xB 30xB 30x B      Body blade    2x 1 min 2x 1 min 2x1min horizontal and vertical 2x1min horizontal and vertical   UBE    3F/3B level 5 3F/3B level 7 3F/3B level 7 3F/3B level 8   Prone I,  T    10x5\" 10x5\" each 10x5\" 2# 10x5\" 2#   IR/ER    Red band 20x5\" each   5x20\" green each   Scap retract with ER     10x10\" red Green 10x10\"    Bicep curls      5# 10x-10\" hold 10x 10\" hold 5# 10x-10\" hold 10x 10\" hold   tricep pull downs      13# 20x    Plank hand taps       Yellow 40x on table knee height                             MANUAL THERAPY: (0 minutes): Joint mobilization, Soft tissue mobilization was utilized and necessary because of the patient's restricted joint motion and restricted motion of soft tissue mobility.  Strumming/release along biceps and anterior deltoid   Strumming along posterior scapular muscles ( NOT TODAY)   Grade II oscillations quad 2        MedBridge Portal  Treatment/Session Summary:    · Response to Treatment:  Pt did well today with progressed strengthening. No increase in pain. · Communication/Consultation:  HEP and gyms in town and getting on regiment   · Equipment provided today:  none    Recommendations/Intent for next treatment session: Next visit will focus on manual interventions as indicated and functional strengthening and RTC strengthening. Treatment Plan of Care Effective Dates: 8/13/19 TO 11/11/2019 (90 days).   Frequency/Duration: 2 times a week for 90 Days      Total Treatment Billable Duration:  40 mins  Art Kate   PT Patient Time In/Time Out  Time In: 5369  Time Out: [address]      Future Appointments   Date Time Provider Manisha Alfaro   9/25/2019  4:00 PM Ck Preston [facility] AND [place]   9/30/2019  4:00 PM Winthrop Nail N [facility]   10/2/2019  4:00 PM Dewey Nail N [facility]   10/7/2019  4:00 PM Dewey Nail N [facility]   10/9/2019  4:00 PM Winthrop Nail N [facility]   10/14/2019  4:00 PM Dewey Nail N [facility]   10/17/2019 11:15 AM Ck Preston [facility]

## 2019-09-25 ENCOUNTER — HOSPITAL ENCOUNTER (OUTPATIENT)
Dept: PHYSICAL THERAPY | Age: 64
Discharge: HOME | End: 2019-09-25
Payer: COMMERCIAL

## 2019-09-25 PROCEDURE — 97110 THERAPEUTIC EXERCISES: CPT

## 2019-09-25 NOTE — PROGRESS NOTES
Tom Mcintyre  : 1955      Payor: [facility] / Plan: [address] / Product Type: PPO /    Nolan Bosworth at [address], [facility], [address] [place] [address]  [phone]   [phone]              OUTPATIENT PHYSICAL THERAPY:Re-evaluation and Progress Report 2019    ICD-10: Treatment Diagnosis: Pain in left shoulder (M25.512)    Incomplete rotator cuff tear or rupture of left shoulder, not specified as traumatic (M75.112)   Bicipital tendinitis, left shoulder (M75.22)             Precautions/Allergies:   Levaquin [levofloxacin]; Cellcept [mycophenolate mofetil]; and Other medication   Fall Risk Score: 1 (? 5 = High Risk)  MD Orders: Eval and Treat  MEDICAL/REFERRING DIAGNOSIS:  Bicipital tendinitis, left shoulder [M75.22]  Incomplete rotator cuff tear or rupture of left shoulder, not specified as traumatic [M75.112]   DATE OF ONSET: year and a half ago  REFERRING PHYSICIAN: Sanket Stanford MD  RETURN PHYSICIAN APPOINTMENT: TBD by patient        Re-assessment:  Tom Mcintyre has demonstrated increase in strength and ROM, however continues to have functional limitations that limit her at work as a  and some end range ROM. Pt will continue to benefit from skilled physical therapy 1-2x/week for manual therapeutic techniques (as appropriate), therapeutic exercises and activities, neuromuscular re-education, and comprehensive home exercises program to address current impairments and functional limitations. INITIAL ASSESSMENT:   Ms. Keshawn Irby presents to physical therapy with tenderness along RTC muscles, decreased L shoulder strength, ROM, joint mobility, functional mobility. These S/S are consistent with L RTC tear and biceps tendinitis.  Patient will benefit from skilled physical therapy for manual therapeutic techniques (as appropriate), therapeutic exercises and activities, neuromuscular re-education, and comprehensive home exercises program to address current impairments and functional limitations. Salma Robert will benefit from skilled PT (medically necessary) in order to address above deficits affecting participation in basic ADLs and overall functional tolerance. PROBLEM LIST (Impacting functional limitations):  · Decreased Strength  · Decreased ADL/Functional Activities  · Increased Pain  · Decreased Activity Tolerance  · Decreased Flexibility/Joint Mobility  · Decreased [place] with Home Exercise Program INTERVENTIONS PLANNED:  1. Cold  2. Heat  3. Family Education  4. Home Exercise Program (HEP)  5. Manual Therapy (TDN)  6. Neuromuscular Re-education/Strengthening  7. Range of Motion (ROM)  8. Therapeutic Activites  9. Therapeutic Exercise/Strengthening  10. Transfer Training  11. Ultrasound   TREATMENT PLAN:  Effective Dates: 8/13/19 TO 11/11/2019 (90 days). Frequency/Duration: 1-2 times a week for 90 Days  GOALS: (Goals have been discussed and agreed upon with patient.)  SHORT-TERM FUNCTIONAL GOALS: Time Frame: 4 weeks  1. Salma Robert will report <=4/10 pain with don/doffing clothing as well as minimal/no difficulty. (met)  2. Salma Robert will demonstrate improvement in active shoulder flexion to >150 degrees to increase UE function and participation in ADLs. (met)  3. Salma Robert will demonstrate demonstrate improvement in active shoulder abduction to >140 degrees to increase UE function and participation in ADLs. (met)  4. Salma Robert will show a greater than 8 point decrease on the DASH in order to show an increase in upper extremity function. (met)  5. Salma Robert will be independent in all HEP (met)    DISCHARGE GOALS: Time Frame: 8 weeks    1. Salma Robert will show full ROM of the UE in order to return to full functional mobility   2.  Salma Robert will show a greater than 15 point decrease on the DASH in order to show an increase in upper extremity function   3. Thelma Matta will report doing hair/bathing without difficulty and <=2/10 pain in order to be independent with ADL's  4. Thelma Matta will be independent in all advanced HEP     Rehabilitation Potential For Stated Goals: GOOD              HISTORY:   History of Present Injury/Illness (Reason for Referral):  Thelma Matta states after about a year and a half her MD did a MRI on her L shoulder after she injured it and revealed a couple small tears in her rotator cuff. Pt states previous PT last summer that did not seem to help very much. She states her shoulder was getting better gradually but her dog jumped up on her and kind of puller her shoulder pretty bad. She states her doctor told her that her tear is not big enough for surgery but needs PT for strengthening.     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 Benito Ibarra  has a past medical history of Allergic rhinitis, cause unspecified (2015), Anxiety, Anxiety, BPPV (benign paroxysmal positional vertigo), Chronic pain, Closed fracture of medial malleolus, Enthesopathy of ankle and tarsus, unspecified, Esophageal reflux (2015), GERD (gastroesophageal reflux disease), Gestational diabetes (), Hallux valgus (acquired), and History of gestational diabetes.   Ms. Benito Ibarra  has a past surgical history that includes hx bunionectomy (Right); hx  section (, ); hx appendectomy (); hx endoscopy (2017); hx colonoscopy (2017); hx abdominal wall defect repair; hx orthopaedic (Left, 1997); and hx orthopaedic (Right, early 2000's). Trigger finger release of thumb (2019). Social History/Living Environment:     lives alone in single story home  Prior Level of Function/Work/Activity:  Full-time     Previous treatment approach: chiropractor currently for manual release and PT last summer that did not help at DIRECT due to inconsistent     Current Medications:    Current Outpatient Medications:     meclizine (ANTIVERT) 25 mg tablet, Take 1 Tab by mouth daily as needed for Dizziness. , Disp: 20 Tab, Rfl: 5    cyanocobalamin (VITAMIN B-12) 1,000 mcg sublingual tablet, Take 1,000 mcg by mouth daily. , Disp: , Rfl:     cholecalciferol (VITAMIN D3) 400 unit tab tablet, Take  by mouth daily. , Disp: , Rfl:     ARCH SUPPORT ORTHOTIC misc, Full sole orthodics and extra depth shoes, Disp: 1 Each, Rfl: 0    aspirin delayed-release 81 mg tablet, Take  by mouth daily. , Disp: , Rfl:     b complex vitamins tablet, Take 1 Tab by mouth daily. , Disp: , Rfl:     busPIRone (BUSPAR) 10 mg tablet, Take 1 Tab by mouth three (3) times daily as needed. (Patient taking differently: Take 10 mg by mouth three (3) times daily as needed. Patient takes 5 mg , prn), Disp: 60 Tab, Rfl: 0    ascorbic acid, vitamin C, (VITAMIN C) 500 mg tablet, Take  by mouth., Disp: , Rfl:     multivitamin (ONE A DAY) tablet, Take 1 Tab by mouth daily. , Disp: , Rfl:     Biotin 2,500 mcg cap, Take  by mouth., Disp: , Rfl:     potassium 99 mg tablet, Take 99 mg by mouth daily. , Disp: , Rfl:     Cetirizine 10 mg cap, Take  by mouth., Disp: , Rfl:     OTHER, Indications: Zatador eye drops as needed, Disp: , Rfl:     fluticasone (FLONASE) 50 mcg/actuation nasal spray, daily. , Disp: , Rfl:     ranitidine (ZANTAC) 150 mg tablet, Take 150 mg by mouth daily as needed for Indigestion. , Disp: , Rfl:     SOY ISOFLA/BLK COHOSH/MAG BARK (ESTROVEN PO), Take 1 Tab by mouth daily. , Disp: , Rfl: Ambulatory/Rehab Services H2 Model Falls Risk Assessment    Risk Factors:       (1)  Dizziness/Vertigo Ability to Rise from Chair:       (0)  Ability to rise in a single movement    Falls Prevention Plan:       No modifications necessary   Total: (5 or greater = High Risk): 1    ©2010 [facility] [employer]. All Rights Reserved. [facility] [employer] Patent #[id number].  Federal Law prohibits the replication, distribution or use without written permission from [facility] [employer]       Date Last Reviewed:  9/25/2019   Number of Personal Factors/Comorbidities that affect the Plan of Care: 1-2: MODERATE COMPLEXITY   EXAMINATION:   Observation/Orthostatic Postural Assessment:          Rounded shoulders  Palpation:          TTP posterior scapular mms, biceps, anterior deltoid  ROM:    AROM/PROM         Joint: Eval Date: 8/13/19  Re-Assess Date:9/25/19  Re-Assess Date:    ACTIVE ROM (standing) RIGHT LEFT RIGHT LEFT RIGHT LEFT   Shoulder Flexion 170 134  160     Shoulder Abduction 174 110  160     Shoulder Internal Rotation (Apley's) T4 T7  T6     Shoulder External Rotation (Apley's) T3 T2  T3     Elbow ROM         PASSIVE ROM (supine)         Shoulder Flexion         Shoulder Abduction         Shoulder Internal Rotation         Shoulder External Rotation                                      Strength:    Joint: Eval Date: 8/13/19  Re-Assess Date: 9/25/19  Re-Assess Date:     RIGHT LEFT RIGHT LEFT RIGHT LEFT   Shoulder Flexion 4+/5 3/5  4+/5     Shoulder Abduction  (C5) 4+/5 3-/5  4+/5     Shoulder Internal Rotation 5/5 4-/5  5/5     Shoulder External Rotation 5/5 4-/5  5/5     Elbow Flexion  (C6) 5/5 4/5  4+/5     Elbow Extension (C7)    4+/5     Wrist Flexion (C7)         Wrist Extension (C6)         Resisted Thumb Extension/Finger Abduction (C8/T1)         Resisted Cervical Rotation (C1):         Resisted Shoulder Shrug (C2, 3, 4):           Strength                                      Joint Mobility Eval Date: 8/13/19 Re-Assess Date:  Re-Assess Date:     RIGHT LEFT RIGHT LEFT RIGHT LEFT   Glenohumeral  Hypomobile   WFL     Scapulothoracic         Thoracic  hypomobile            Special Tests:    Impingement tests:   Menendez-Cristóbal test: +   Neer test: -   Horizontal adduction test: + in back of shoulder  Biceps brachii tests:   Yergason's test:+       Neurological Screen: Assessed @ Initial Visit    Radiating symptoms? NO  Functional Mobility:  Assessed @ Initial Visit; limited with behind the back and overhead reaching  Balance:  Assessed @ Initial Visit      Body Structures Involved:  1. Bones  2. Joints  3. Muscles  4. Ligaments Body Functions Affected:  1. Sensory/Pain  2. Neuromusculoskeletal  3. Movement Related Activities and Participation Affected:  1. Mobility  2. Self Care   CLINICAL PRESENTATION:   CLINICAL DECISION MAKING:   Outcome Measure: Tool Used: Disabilities of the Arm, Shoulder and Hand (DASH) Questionnaire - Quick Version  Score:  Initial: 28/55  Most Recent: 14/55 (Date: 9-25-19 )   Interpretation of Score: The DASH is designed to measure the activities of daily living in person's with upper extremity dysfunction or pain. Each section is scored on a 1-5 scale, 5 representing the greatest disability. The scores of each section are added together for a total score of 55. Score 11 12-19 20-28 29-37 38-45 46-54 55   Modifier CH CI CJ CK CL CM CN     ? Carrying, Moving, and Handling Objects:     - CURRENT STATUS: CJ - 20%-39% impaired, limited or restricted    - GOAL STATUS: CI - 1%-19% impaired, limited or restricted    - D/C STATUS:  ---------------To be determined---------------    Medical Necessity:   · Skilled intervention continues to be required due to deficits and impairments seen upon initial evaluation affecting patient's participation in ADLs and functional tasks.   ·   Reason for Services/Other Comments:  · Patient continues to require skilled intervention due to deficits and impairments seen upon initial evaluation affecting patient's participation in ADLs and functional tasks. See associated treatment note for treatment provided today.     Future Appointments   Date Time Provider Manisha Alfaro   9/30/2019  4:00 PM Holly Mariano [facility] AND [facility]   10/2/2019  4:00 PM Holly MCARTHURRPMARINA [facility]   10/7/2019  4:00 PM Randy [facility]   10/9/2019  4:00 PM Randy [facility]   10/14/2019  4:00 PM Randy HONG [facility]   10/17/2019 11:15 AM Holly [facility]       Total Treatment Duration: 40 mins       Viola Heimlich

## 2019-09-25 NOTE — PROGRESS NOTES
Melisa Hargrove  : 1955  Payor: [facility] / Plan: [address] / Product Type: PPO /  [place] Ranks at [address] [facility]. Adeline Marrufo, [address], [address] [place] [address]  [phone]   [phone]                                                          Jose Maria Welch MD      OUTPATIENT PHYSICAL THERAPY: Daily Treatment Note 2019 Visit Count:  8    Tx Diagnosis   ICD-10: Treatment Diagnosis: Pain in left shoulder (M25.512)    Incomplete rotator cuff tear or rupture of left shoulder, not specified as traumatic (M75.112)   Bicipital tendinitis, left shoulder (M75.22)             Precautions/Allergies:   Levaquin [levofloxacin]; Cellcept [mycophenolate mofetil]; and Other medication   Fall Risk Score: 1 (? 5 = High Risk)  MD Orders: Eval and Treat  MEDICAL/REFERRING DIAGNOSIS:  Bicipital tendinitis, left shoulder [M75.22]  Incomplete rotator cuff tear or rupture of left shoulder, not specified as traumatic [M75.112]   DATE OF ONSET: year and a half ago  REFERRING PHYSICIAN: Mary Little MD  RETURN PHYSICIAN APPOINTMENT: TBD by patient        Pre-treatment Symptoms/Complaints:  A little sore and tight but noticing more ROM  Pain: Initial:   0/10 Post Session:  0/10   Medications Last Reviewed:  2019     Updated Objective Findings:  See initial Evaluation for initial objective measures. TTP biceps tendon/anterior deltoid, posterior scapular muscles  Hypomobile thoracic    TREATMENT:   THERAPEUTIC EXERCISE: (40 minutes):  Exercises per grid below to improve mobility, strength and balance. Required minimal visual, verbal and manual cues to promote proper body alignment and promote proper body posture. Progressed resistance and complexity of movement as indicated.      Date:  19 Date:  19 Date:  19 Date  19 Date  9/3/19 Date  19 Date  19 Date  19   Activity/Exercise Parameters Parameters Parameters Education Pathology, anatomy, POC, PT goals, HEP          L stretch 5x10\"  5x10\" 5x10\" 10x10\" 5x20\" 5x20\" 5x20\"    Scapular retraction 5x10\" green 20x5\" 17# 20x5\" 17# 20x5\" 17#  Ext-20# 20x Ext-20# 20x Ext 20x 17#   IR isometric 5x10\" green 5x20\" red and blue 5x20\" red and blue        ER isometric 5x10\" green 5x20\" red 5x20\" green        Bicep ecc   4# 15x 5# 10x 5# 15x       SA punch  2x10 15x  15x against wall      Shrugs   15x 8# 30x8#       Ball circles   20xB 30xB 30x B       Body blade    2x 1 min 2x 1 min 2x1min horizontal and vertical 2x1min horizontal and vertical 2x1min horizontal and vertical   UBE    3F/3B level 5 3F/3B level 7 3F/3B level 7 3F/3B level 8 3F/3B level 8   Prone I,  T    10x5\" 10x5\" each 10x5\" 2# 10x5\" 2#    IR/ER    Red band 20x5\" each   5x20\" green each 20x at 90 abd green each   Scap retract with ER     10x10\" red Green 10x10\"     Bicep curls      5# 10x-10\" hold 10x 10\" hold 5# 10x-10\" hold 10x 10\" hold 6# 20x   tricep pull downs      13# 20x     Plank hand taps       Yellow 40x on table knee height Yellow 40x on table knee height   Shoulder flexion        2# 20x   IR stretch        1 min         MANUAL THERAPY: (0 minutes): Joint mobilization, Soft tissue mobilization was utilized and necessary because of the patient's restricted joint motion and restricted motion of soft tissue mobility.  Strumming/release along biceps and anterior deltoid   Strumming along posterior scapular muscles ( NOT TODAY)   Grade II oscillations quad 2        MedBridge Portal  Treatment/Session Summary:    · Response to Treatment:  Pt did well today with progressed strengthening. No increase in pain. See re-eval for details  · Communication/Consultation:  HEP and gyms in town and getting on regiment. Pt to continue 1x/wk for continued skilled PT for meeting remaining long term goals.   · Equipment provided today:  none    Recommendations/Intent for next treatment session: Next visit will focus on manual interventions as indicated and functional strengthening and RTC strengthening. Treatment Plan of Care Effective Dates: 8/13/19 TO 11/11/2019 (90 days).   Frequency/Duration:1- 2 times a week for 90 Days      Total Treatment Billable Duration:  40 mins  Bryan Murguia   PT Patient Time In/Time Out  Time In: 1600  Time Out: 1640      Future Appointments   Date Time Provider Manisha Alfaro   9/30/2019  [facility]   10/2/2019  4:00 PM Drenda Mins N [facility]   10/7/2019  4:00 PM Drenda Mins N [facility]   10/9/2019  4:00 PM Drenda Mins N [facility]   10/14/2019  4:00 PM Drenda Mins N [facility]   10/17/2019 11:15 AM Cathleen Blair [facility]

## 2019-09-30 ENCOUNTER — HOSPITAL ENCOUNTER (OUTPATIENT)
Dept: PHYSICAL THERAPY | Age: 64
Discharge: HOME | End: 2019-09-30
Payer: COMMERCIAL

## 2019-09-30 NOTE — PROGRESS NOTES
Paula Serrato  : 1955  Primary: Sophia Reynoso [facility]  Secondary:  [address] at . [address]  [address]. [facility] [address], [address]  [phone]   [phone]        OUTPATIENT DAILY NOTE    NAME/AGE/GENDER: Paula Serrato is a 59 y.o. female. DATE: 2019    Ms. Juan F Brown for today's appointment due to she is unable to make it today.     Sita Cavanaugh

## 2019-10-02 ENCOUNTER — HOSPITAL ENCOUNTER (OUTPATIENT)
Dept: PHYSICAL THERAPY | Age: 64
Discharge: HOME | End: 2019-10-02
Payer: COMMERCIAL

## 2019-10-02 PROCEDURE — 97110 THERAPEUTIC EXERCISES: CPT

## 2019-10-02 NOTE — PROGRESS NOTES
Paula Serrato  : 1955  Payor: [facility] / Plan: [id number] / Product Type: PPO /  A[place] Quiver at [address] [facility], [address] [place] [address]  [phone]   [phone]                                                          [facility] Theresa Haro MD      OUTPATIENT PHYSICAL THERAPY: Daily Treatment Note 10/2/2019 Visit Count:  9    Tx Diagnosis   ICD-10: Treatment Diagnosis: Pain in left shoulder (M25.512)    Incomplete rotator cuff tear or rupture of left shoulder, not specified as traumatic (M75.112)   Bicipital tendinitis, left shoulder (M75.22)             Precautions/Allergies:   Levaquin [levofloxacin]; Cellcept [mycophenolate mofetil]; and Other medication   Fall Risk Score: 1 (? 5 = High Risk)  MD Orders: Eval and Treat  MEDICAL/REFERRING DIAGNOSIS:  Bicipital tendinitis, left shoulder [M75.22]  Incomplete rotator cuff tear or rupture of left shoulder, not specified as traumatic [M75.112]   DATE OF ONSET: year and a half ago  REFERRING PHYSICIAN: Kaylan Menard MD  RETURN PHYSICIAN APPOINTMENT: TBD by patient        Pre-treatment Symptoms/Complaints:  A little sore in R shoulder after last time but has loosened up  Pain: Initial:   0/10 Post Session:  0/10   Medications Last Reviewed:  10/2/2019     Updated Objective Findings:  See initial Evaluation for initial objective measures. TTP biceps tendon/anterior deltoid, posterior scapular muscles  Hypomobile thoracic    TREATMENT:   THERAPEUTIC EXERCISE: (40 minutes):  Exercises per grid below to improve mobility, strength and balance. Required minimal visual, verbal and manual cues to promote proper body alignment and promote proper body posture. Progressed resistance and complexity of movement as indicated.      Date:  19 Date:  19 Date:  19 Date  9/3/19 Date  19 Date  19 Date  19 Date  10/2/19   Activity/Exercise Parameters Parameters Parameters        Education Pathology, anatomy, POC, PT goals, HEP          L stretch 5x10\"  5x10\" 5x10\" 5x20\" 5x20\" 5x20\"  5x20\"   Scapular retraction 5x10\" green 20x5\" 17# 20x5\" 17#  Ext-20# 20x Ext-20# 20x Ext 20x 17# 30x 17#   IR isometric 5x10\" green 5x20\" red and blue 5x20\" red and blue        ER isometric 5x10\" green 5x20\" red 5x20\" green        Bicep ecc   4# 15x 5# 10x        SA punch  2x10 15x 15x against wall       Shrugs   15x 8#     20x 6#   Ball circles   20xB 30x B        Body blade    2x 1 min 2x1min horizontal and vertical 2x1min horizontal and vertical 2x1min horizontal and vertical 2x1min horizontal and vertical   UBE    3F/3B level 7 3F/3B level 7 3F/3B level 8 3F/3B level 8 3F/3B level 8   Prone I,  T    10x5\" each 10x5\" 2# 10x5\" 2#     IR/ER      5x20\" green each 20x at 90 abd green each 10x at 90 abd green each   Scap retract with ER    10x10\" red Green 10x10\"      Bicep curls     5# 10x-10\" hold 10x 10\" hold 5# 10x-10\" hold 10x 10\" hold 6# 20x 6# 20x with 2 10\" holds   tricep pull downs     13# 20x      Plank hand taps      Yellow 40x on table knee height Yellow 40x on table knee height Red 20x on table knee height   Shoulder flexion       2# 20x 2#30x   IR stretch       1 min 1 min   Shoulder flex with scap retraction wall slides        15x5\"                               MANUAL THERAPY: (0 minutes): Joint mobilization, Soft tissue mobilization was utilized and necessary because of the patient's restricted joint motion and restricted motion of soft tissue mobility.  Strumming/release along biceps and anterior deltoid   Strumming along posterior scapular muscles ( NOT TODAY)   Grade II oscillations quad 2        Med[employer] Portal  Treatment/Session Summary:    · Response to Treatment:  Pt did well today with progressed strengthening. No increase in pain. Progressed very well  · Communication/Consultation:  HEP and gyms in town and getting on regiment.  Pt to continue 1x/wk for continued skilled PT for meeting remaining long term goals. · Equipment provided today:  none    Recommendations/Intent for next treatment session: Next visit will focus on manual interventions as indicated and functional strengthening and RTC strengthening. Treatment Plan of Care Effective Dates: 8/13/19 TO 11/11/2019 (90 days).   Frequency/Duration:1- 2 times a week for 90 Days      Total Treatment Billable Duration:  40 mins  Rohit Earl   PT Patient Time In/Time Out  Time In: 1600  Time Out: 1640      Future Appointments   Date Time Provider Manisha Alfaro   10/7/2019  4:00 PM [facility]   [facility]

## 2019-10-07 ENCOUNTER — HOSPITAL ENCOUNTER (OUTPATIENT)
Dept: PHYSICAL THERAPY | Age: 64
Discharge: HOME | End: 2019-10-07
Payer: COMMERCIAL

## 2019-10-07 PROCEDURE — 97110 THERAPEUTIC EXERCISES: CPT

## 2019-10-07 NOTE — PROGRESS NOTES
Becky Palafox  : 1955  Payor: [facility] / Plan: [id number] / Product Type: PPO /  [place] at [address] [facility], [address], [id number] [place] [address] Road  [phone]   [phone]                                                          Jocelyn Lock MD      OUTPATIENT PHYSICAL THERAPY: Daily Treatment Note 10/7/2019 Visit Count:  10    Tx Diagnosis   ICD-10: Treatment Diagnosis: Pain in left shoulder (M25.512)    Incomplete rotator cuff tear or rupture of left shoulder, not specified as traumatic (M75.112)   Bicipital tendinitis, left shoulder (M75.22)             Precautions/Allergies:   Levaquin [levofloxacin]; Cellcept [mycophenolate mofetil]; and Other medication   Fall Risk Score: 1 (? 5 = High Risk)  MD Orders: Eval and Treat  MEDICAL/REFERRING DIAGNOSIS:  Bicipital tendinitis, left shoulder [M75.22]  Incomplete rotator cuff tear or rupture of left shoulder, not specified as traumatic [M75.112]   DATE OF ONSET: year and a half ago  REFERRING PHYSICIAN: Ce Hernandez MD  RETURN PHYSICIAN APPOINTMENT: TBD by patient        Pre-treatment Symptoms/Complaints:  A little sore in R shoulder after last time but has loosened up  Pain: Initial:   0/10 Post Session:  0/10   Medications Last Reviewed:  10/7/2019     Updated Objective Findings:  See initial Evaluation for initial objective measures. TTP biceps tendon/anterior deltoid, posterior scapular muscles  Hypomobile thoracic    TREATMENT:   THERAPEUTIC EXERCISE: (38 minutes):  Exercises per grid below to improve mobility, strength and balance. Required minimal visual, verbal and manual cues to promote proper body alignment and promote proper body posture. Progressed resistance and complexity of movement as indicated.      Date:  19 Date:  19 Date:  19 Date  9/3/19 Date  19 Date  19 Date  10/2/19 Date  10/7/19   Activity/Exercise Parameters Parameters Parameters        Education Pathology, anatomy, POC, PT goals, HEP          L stretch 5x10\"  5x10\" 5x10\" 5x20\" 5x20\"  5x20\"    Scapular retraction 5x10\" green 20x5\" 17# 20x5\" 17#  Ext-20# 20x Ext 20x 17# 30x 17# 23# 20x   IR isometric 5x10\" green 5x20\" red and blue 5x20\" red and blue        ER isometric 5x10\" green 5x20\" red 5x20\" green        Bicep ecc   4# 15x 5# 10x        SA punch  2x10 15x 15x against wall       Shrugs   15x 8#    20x 6# 20x 7#   Ball circles   20xB 30x B        Body blade    2x 1 min 2x1min horizontal and vertical 2x1min horizontal and vertical 2x1min horizontal and vertical 2x1min horizontal and vertical   UBE    3F/3B level 7 3F/3B level 8 3F/3B level 8 3F/3B level 8 3F/3B level 10   Prone I,  T    10x5\" each 10x5\" 2#      IR/ER     5x20\" green each 20x at 90 abd green each 10x at 90 abd green each 15x at 90 abd green each   Scap retract with ER    10x10\" red    10x10\" green   Bicep curls     5# 10x-10\" hold 10x 10\" hold 6# 20x 6# 20x with 2 10\" holds 6# 30x with 3 10\" holds   tricep pull downs        17# 20x   Plank hand taps     Yellow 40x on table knee height Yellow 40x on table knee height Red 20x on table knee height green 30x on table knee height   Shoulder flexion      2# 20x 2#30x    IR stretch      1 min 1 min    Shoulder flex with scap retraction wall slides       15x5\"    Doorway stretch        3x30\"                    MANUAL THERAPY: (0 minutes): Joint mobilization, Soft tissue mobilization was utilized and necessary because of the patient's restricted joint motion and restricted motion of soft tissue mobility.  Strumming/release along biceps and anterior deltoid   Strumming along posterior scapular muscles ( NOT TODAY)   Grade II oscillations quad 2        MedBridge Portal  Treatment/Session Summary:    · Response to Treatment:  Pt did well today with progressed strengthening. No increase in pain.  Progressed very well and to discharge next visit.  · Communication/Consultation:  HEP and gyms in town and getting on regiment. Pt to continue 1x/wk for continued skilled PT for meeting remaining long term goals. · Equipment provided today:  none    Recommendations/Intent for next treatment session: Next visit will focus on manual interventions as indicated and functional strengthening and RTC strengthening. Treatment Plan of Care Effective Dates: 8/13/19 TO 11/11/2019 (90 days).   Frequency/Duration:1- 2 times a week for 90 Days      Total Treatment Billable Duration:  38 mins  Summer Hernandez   PT Patient Time In/Time Out  Time In: 3495  Time Out: 1640      Future Appointments   Date Time Provider Manisha Alfaro   10/17/2019 11:15 AM [facility]

## 2019-10-09 ENCOUNTER — APPOINTMENT (OUTPATIENT)
Dept: PHYSICAL THERAPY | Age: 64
End: 2019-10-09
Payer: COMMERCIAL

## 2019-10-14 ENCOUNTER — APPOINTMENT (OUTPATIENT)
Dept: PHYSICAL THERAPY | Age: 64
End: 2019-10-14
Payer: COMMERCIAL

## 2019-10-17 ENCOUNTER — HOSPITAL ENCOUNTER (OUTPATIENT)
Dept: PHYSICAL THERAPY | Age: 64
Discharge: HOME | End: 2019-10-17
Payer: COMMERCIAL

## 2019-10-17 PROCEDURE — 97110 THERAPEUTIC EXERCISES: CPT

## 2019-10-17 NOTE — PROGRESS NOTES
Diana Mcdermott  : 1955  Payor: [facility] / Plan: [id number] / Product Type: PPO /  Antoinette Guerras at [facility]. [facility], [address], [address] [place] [address]  [phone]   [phone]                                                          Denise Tipton MD      OUTPATIENT PHYSICAL THERAPY: Daily Treatment Note 10/17/2019 Visit Count:  11    Tx Diagnosis   ICD-10: Treatment Diagnosis: Pain in left shoulder (M25.512)    Incomplete rotator cuff tear or rupture of left shoulder, not specified as traumatic (M75.112)   Bicipital tendinitis, left shoulder (M75.22)             Precautions/Allergies:   Levaquin [levofloxacin]; Cellcept [mycophenolate mofetil]; and Other medication   Fall Risk Score: 1 (? 5 = High Risk)  MD Orders: Eval and Treat  MEDICAL/REFERRING DIAGNOSIS:  Bicipital tendinitis, left shoulder [M75.22]  Incomplete rotator cuff tear or rupture of left shoulder, not specified as traumatic [M75.112]   DATE OF ONSET: year and a half ago  REFERRING PHYSICIAN: Leeroy Vizcaino MD  RETURN PHYSICIAN APPOINTMENT: TBD by patient        Pre-treatment Symptoms/Complaints:  I feel much stronger and doing well with exercises at home  Pain: Initial:   0/10 Post Session:  0/10   Medications Last Reviewed:  10/17/2019     Updated Objective Findings:  See initial Evaluation for initial objective measures. TTP biceps tendon/anterior deltoid, posterior scapular muscles  Hypomobile thoracic    TREATMENT:   THERAPEUTIC EXERCISE: (38 minutes):  Exercises per grid below to improve mobility, strength and balance. Required minimal visual, verbal and manual cues to promote proper body alignment and promote proper body posture. Progressed resistance and complexity of movement as indicated.      Date:  19 Date:  19 Date:  19 Date  9/3/19 Date  19 Date  19 Date  10/2/19 Date  10/7/19 Date  10/17/19   Activity/Exercise Parameters Parameters Parameters         Education Pathology, anatomy, POC, PT goals, HEP           L stretch 5x10\"  5x10\" 5x10\" 5x20\" 5x20\"  5x20\"  3x20\"   Scapular retraction 5x10\" green 20x5\" 17# 20x5\" 17#  Ext-20# 20x Ext 20x 17# 30x 17# 23# 20x 23# 30x   IR isometric 5x10\" green 5x20\" red and blue 5x20\" red and blue         ER isometric 5x10\" green 5x20\" red 5x20\" green         Bicep ecc   4# 15x 5# 10x         SA punch  2x10 15x 15x against wall        Shrugs   15x 8#    20x 6# 20x 7# 20x6#   Ball circles   20xB 30x B         Body blade    2x 1 min 2x1min horizontal and vertical 2x1min horizontal and vertical 2x1min horizontal and vertical 2x1min horizontal and vertical 2x1min horizontal and vertical   UBE    3F/3B level 7 3F/3B level 8 3F/3B level 8 3F/3B level 8 3F/3B level 10 2F/2B level 10   Prone I,  T    10x5\" each 10x5\" 2#       IR/ER     5x20\" green each 20x at 90 abd green each 10x at 90 abd green each 15x at 90 abd green each 10x ea and 5x20\" blue   Scap retract with ER    10x10\" red    10x10\" green    Bicep curls     5# 10x-10\" hold 10x 10\" hold 6# 20x 6# 20x with 2 10\" holds 6# 30x with 3 10\" holds 6# 30x with 3 10\" holds   tricep pull downs        17# 20x    Plank hand taps     Yellow 40x on table knee height Yellow 40x on table knee height Red 20x on table knee height green 30x on table knee height    Shoulder flexion      2# 20x 2#30x     IR stretch      1 min 1 min     Shoulder flex with scap retraction wall slides       15x5\"     Doorway stretch        3x30\"    Rows         10# L         MANUAL THERAPY: (0 minutes): Joint mobilization, Soft tissue mobilization was utilized and necessary because of the patient's restricted joint motion and restricted motion of soft tissue mobility.     Strumming/release along biceps and anterior deltoid   Strumming along posterior scapular muscles ( NOT TODAY)   Grade II oscillations quad 2        Med[facility] Portal  Treatment/Session Summary:    · Response to Treatment: Pt has met all goals and d/c to independent program.  · Communication/Consultation:  HEP continuation  · Equipment provided today:  none            Treatment Plan of Care Effective Dates: 8/13/19 TO 11/11/2019 (90 days). Frequency/Duration:1- 2 times a week for 90 Days      Total Treatment Billable Duration:  38 mins  Luis Angel Segura   PT Patient Time In/Time Out  Time In: 1115  Time Out: 1200      No future appointments.

## 2019-10-17 NOTE — THERAPY DISCHARGE
Diana Mcdermott  : 1955      Payor: [facility] / Plan: [address] / Product Type: O /    [address] [facility] at [address] [place] [address]. [address] [place] [address]  [phone]   [phone]              OUTPATIENT PHYSICAL THERAPY:Discharge 10/17/2019    ICD-10: Treatment Diagnosis: Pain in left shoulder (M25.512)    Incomplete rotator cuff tear or rupture of left shoulder, not specified as traumatic (M75.112)   Bicipital tendinitis, left shoulder (M75.22)             Precautions/Allergies:   Levaquin [levofloxacin]; Cellcept [mycophenolate mofetil]; and Other medication   Fall Risk Score: 1 (? 5 = High Risk)  MD Orders: Eval and Treat  MEDICAL/REFERRING DIAGNOSIS:  Bicipital tendinitis, left shoulder [M75.22]  Incomplete rotator cuff tear or rupture of left shoulder, not specified as traumatic [M75.112]   DATE OF ONSET: year and a half ago  REFERRING PHYSICIAN: Leeroy Vizcaino MD  RETURN PHYSICIAN APPOINTMENT: TBD by patient      Discharge Summary: Diana Mcdermott has met all goals for PT and demonstrates full functional ROM in shoulder and strength. She reports no limitations at home or in classroom moving chairs anymore. Pt is independent with exercise routine and discharged from PT at this time. Re-assessment:  Diana Mcdermott has demonstrated increase in strength and ROM, however continues to have functional limitations that limit her at work as a  and some end range ROM. Pt will continue to benefit from skilled physical therapy 1-2x/week for manual therapeutic techniques (as appropriate), therapeutic exercises and activities, neuromuscular re-education, and comprehensive home exercises program to address current impairments and functional limitations.       INITIAL ASSESSMENT:   Ms. Alfred Alfonso presents to physical therapy with tenderness along RTC muscles, decreased L shoulder strength, ROM, joint mobility, functional mobility. These S/S are consistent with L RTC tear and biceps tendinitis. Patient will benefit from skilled physical therapy for manual therapeutic techniques (as appropriate), therapeutic exercises and activities, neuromuscular re-education, and comprehensive home exercises program to address current impairments and functional limitations. ·  1.    TREATMENT PLAN:  Effective Dates: 8/13/19 TO 11/11/2019 (90 days). Frequency/Duration: 1-2 times a week for 90 Days  GOALS: (Goals have been discussed and agreed upon with patient.)  SHORT-TERM FUNCTIONAL GOALS: Time Frame: 4 weeks  1. Arley Hanley will report <=4/10 pain with don/doffing clothing as well as minimal/no difficulty. (met)  2. Arley Hanley will demonstrate improvement in active shoulder flexion to >150 degrees to increase UE function and participation in ADLs. (met)  3. Arley Hanley will demonstrate demonstrate improvement in active shoulder abduction to >140 degrees to increase UE function and participation in ADLs. (met)  4. Arley Hanley will show a greater than 8 point decrease on the DASH in order to show an increase in upper extremity function. (met)  5. Arley Hanley will be independent in all HEP (met)    DISCHARGE GOALS: Time Frame: 8 weeks    1. Arley Hanley will show full ROM of the UE in order to return to full functional mobility (met)  2. Arley Hanley will show a greater than 15 point decrease on the DASH in order to show an in crease in upper extremity function (met)  3. Arley Hanley will report doing hair/bathing without difficulty and <=2/10 pain in order to be independent with ADL's (met)  4.  Arley Hanley will be independent in all advanced HEP (met)    Rehabilitation Potential For Stated Goals: GOOD              HISTORY:   History of Present Injury/Illness (Reason for Referral):  Arley Hanley states after about a year and a half her MD did a MRI on her L shoulder after she injured it and revealed a couple small tears in her rotator cuff. Pt states previous PT last summer that did not seem to help very much. She states her shoulder was getting better gradually but her dog jumped up on her and kind of puller her shoulder pretty bad. She states her doctor told her that her tear is not big enough for surgery but needs PT for strengthening.     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 Analia James  has a past medical history of Allergic rhinitis, cause unspecified (2015), Anxiety, Anxiety, BPPV (benign paroxysmal positional vertigo), Chronic pain, Closed fracture of medial malleolus, Enthesopathy of ankle and tarsus, unspecified, Esophageal reflux (2015), GERD (gastroesophageal reflux disease), Gestational diabetes (), Hallux valgus (acquired), and History of gestational diabetes. Ms. Analia James  has a past surgical history that includes hx bunionectomy (Right); hx  section (, ); hx appendectomy (); hx endoscopy (2017); hx colonoscopy (2017); hx abdominal wall defect repair; hx orthopaedic (Left, ); and hx orthopaedic (Right, early ). Trigger finger release of thumb ().    Social History/Living Environment:     lives alone in single story home  Prior Level of Function/Work/Activity:  Full-time     Previous treatment approach: chiropractor currently for manual release and PT last summer that did not help at [employer] due to inconsistent     Current Medications:    Current Outpatient Medications:   meclizine (ANTIVERT) 25 mg tablet, Take 1 Tab by mouth daily as needed for Dizziness. , Disp: 20 Tab, Rfl: 5    cyanocobalamin (VITAMIN B-12) 1,000 mcg sublingual tablet, Take 1,000 mcg by mouth daily. , Disp: , Rfl:     cholecalciferol (VITAMIN D3) 400 unit tab tablet, Take  by mouth daily. , Disp: , Rfl:     ARCH SUPPORT ORTHOTIC misc, Full sole orthodics and extra depth shoes, Disp: 1 Each, Rfl: 0    aspirin delayed-release 81 mg tablet, Take  by mouth daily. , Disp: , Rfl:     b complex vitamins tablet, Take 1 Tab by mouth daily. , Disp: , Rfl:     busPIRone (BUSPAR) 10 mg tablet, Take 1 Tab by mouth three (3) times daily as needed. (Patient taking differently: Take 10 mg by mouth three (3) times daily as needed. Patient takes 5 mg , prn), Disp: 60 Tab, Rfl: 0    ascorbic acid, vitamin C, (VITAMIN C) 500 mg tablet, Take  by mouth., Disp: , Rfl:     multivitamin (ONE A DAY) tablet, Take 1 Tab by mouth daily. , Disp: , Rfl:     Biotin 2,500 mcg cap, Take  by mouth., Disp: , Rfl:     potassium 99 mg tablet, Take 99 mg by mouth daily. , Disp: , Rfl:     Cetirizine 10 mg cap, Take  by mouth., Disp: , Rfl:     OTHER, Indications: Zatador eye drops as needed, Disp: , Rfl:     fluticasone (FLONASE) 50 mcg/actuation nasal spray, daily. , Disp: , Rfl:     ranitidine (ZANTAC) 150 mg tablet, Take 150 mg by mouth daily as needed for Indigestion. , Disp: , Rfl:     SOY ISOFLA/BLK COHOSH/MAG BARK (ESTROVEN PO), Take 1 Tab by mouth daily. , Disp: , Rfl:         Ambulatory/Rehab Services H2 Model Falls Risk Assessment    Risk Factors:       (1)  Dizziness/Vertigo Ability to Rise from Chair:       (0)  Ability to rise in a single movement    Falls Prevention Plan:       No modifications necessary   Total: (5 or greater = High Risk): 1    ©2010 [facility] [employer]. All Rights Reserved. [facility] Patent #[id number].  Federal Law prohibits the replication, distribution or use without written permission from [employer]       Date Last Reviewed:  10/17/2019   Number of Personal Factors/Comorbidities that affect the Plan of Care: 1-2: MODERATE COMPLEXITY   EXAMINATION:   Observation/Orthostatic Postural Assessment:         Normal posture  Palpation:          No tenderness  ROM:    AROM/PROM         Joint: Eval Date: 8/13/19  Re-Assess Date:9/25/19  Re-Assess Date: 10/17/19    ACTIVE ROM (standing) RIGHT LEFT RIGHT LEFT RIGHT LEFT   Shoulder Flexion 170 134  160  160   Shoulder Abduction 174 110  160  161   Shoulder Internal Rotation (Apley's) T4 T7  T6  T6   Shoulder External Rotation (Apley's) T3 T2  T3  T4   Elbow ROM         PASSIVE ROM (supine)         Shoulder Flexion         Shoulder Abduction         Shoulder Internal Rotation         Shoulder External Rotation                                      Strength:    Joint: Eval Date: 8/13/19  Re-Assess Date: 9/25/19  Re-Assess Date: 10/17/19     RIGHT LEFT RIGHT LEFT RIGHT LEFT   Shoulder Flexion 4+/5 3/5  4+/5  4+/5   Shoulder Abduction  (C5) 4+/5 3-/5  4+/5  4+/5   Shoulder Internal Rotation 5/5 4-/5  5/5  5/5   Shoulder External Rotation 5/5 4-/5  5/5  5/5   Elbow Flexion  (C6) 5/5 4/5  4+/5  5.5   Elbow Extension (C7)    4+/5     Wrist Flexion (C7)         Wrist Extension (C6)         Resisted Thumb Extension/Finger Abduction (C8/T1)         Resisted Cervical Rotation (C1):         Resisted Shoulder Shrug (C2, 3, 4):           Strength                                      Joint Mobility Eval Date: 8/13/19  Re-Assess Date:  Re-Assess Date:     RIGHT LEFT RIGHT LEFT RIGHT LEFT   Glenohumeral  Hypomobile   [facility]     Scapulothoracic         Thoracic  hypomobile            Special Tests:    Impingement tests:   Nazanin test: -   Neer test: -   Horizontal adduction test: -  Biceps brachii tests:   Yergason's test:-       Neurological Screen: Assessed @ Initial Visit    Radiating symptoms?  NO  Functional Mobility:  None  Balance:  Assessed @ Initial Visit 1.  1.  1.    CLINICAL PRESENTATION:   CLINICAL DECISION MAKING:   Outcome Measure: Tool Used: Disabilities of the Arm, Shoulder and Hand (DASH) Questionnaire - Quick Version  Score:  Initial: 28/55  Most Recent: 13/55 (Date: 10-17-19 )   Interpretation of Score: The DASH is designed to measure the activities of daily living in person's with upper extremity dysfunction or pain. Each section is scored on a 1-5 scale, 5 representing the greatest disability. The scores of each section are added together for a total score of 55. Score 11 12-19 20-28 29-37 38-45 46-54 55   Modifier CH CI CJ CK CL CM CN     ? Carrying, Moving, and Handling Objects:     - CURRENT STATUS: CI - 1%-19% impaired, limited or restricted    - GOAL STATUS: CI - 1%-19% impaired, limited or restricted    - D/C STATUS:  CI - 1%-19% impaired, limited or restricted  ·      See associated treatment note for treatment provided today. No future appointments.     Total Treatment Duration: 38 mins  PT Patient Time In/Time Out  Time In: 1115  Time Out: Bjorn Botello

## 2020-01-29 ENCOUNTER — APPOINTMENT (RX ONLY)
Dept: URBAN - METROPOLITAN AREA CLINIC 24 | Facility: CLINIC | Age: 65
Setting detail: DERMATOLOGY
End: 2020-01-29

## 2020-01-29 DIAGNOSIS — L81.4 OTHER MELANIN HYPERPIGMENTATION: ICD-10-CM

## 2020-01-29 DIAGNOSIS — Z85.828 PERSONAL HISTORY OF OTHER MALIGNANT NEOPLASM OF SKIN: ICD-10-CM

## 2020-01-29 DIAGNOSIS — D18.0 HEMANGIOMA: ICD-10-CM

## 2020-01-29 DIAGNOSIS — Z71.89 OTHER SPECIFIED COUNSELING: ICD-10-CM

## 2020-01-29 DIAGNOSIS — L82.1 OTHER SEBORRHEIC KERATOSIS: ICD-10-CM

## 2020-01-29 DIAGNOSIS — R20.2 PARESTHESIA OF SKIN: ICD-10-CM

## 2020-01-29 PROBLEM — D18.01 HEMANGIOMA OF SKIN AND SUBCUTANEOUS TISSUE: Status: ACTIVE | Noted: 2020-01-29

## 2020-01-29 PROCEDURE — 99214 OFFICE O/P EST MOD 30 MIN: CPT

## 2020-01-29 PROCEDURE — ? COUNSELING

## 2020-01-29 ASSESSMENT — LOCATION SIMPLE DESCRIPTION DERM
LOCATION SIMPLE: LEFT LOWER BACK
LOCATION SIMPLE: RIGHT SHOULDER
LOCATION SIMPLE: RIGHT CALF
LOCATION SIMPLE: LEFT UPPER BACK
LOCATION SIMPLE: RIGHT LOWER BACK
LOCATION SIMPLE: CHEST
LOCATION SIMPLE: POSTERIOR NECK
LOCATION SIMPLE: RIGHT THIGH
LOCATION SIMPLE: ABDOMEN
LOCATION SIMPLE: UPPER BACK
LOCATION SIMPLE: RIGHT UPPER BACK

## 2020-01-29 ASSESSMENT — LOCATION DETAILED DESCRIPTION DERM
LOCATION DETAILED: SUPERIOR THORACIC SPINE
LOCATION DETAILED: RIGHT LATERAL ABDOMEN
LOCATION DETAILED: SUBXIPHOID
LOCATION DETAILED: UPPER STERNUM
LOCATION DETAILED: RIGHT POSTERIOR SHOULDER
LOCATION DETAILED: RIGHT MEDIAL UPPER BACK
LOCATION DETAILED: STERNAL NOTCH
LOCATION DETAILED: RIGHT ANTERIOR PROXIMAL THIGH
LOCATION DETAILED: RIGHT SUPERIOR UPPER BACK
LOCATION DETAILED: RIGHT MEDIAL TRAPEZIAL NECK
LOCATION DETAILED: RIGHT SUPERIOR MEDIAL MIDBACK
LOCATION DETAILED: LEFT SUPERIOR UPPER BACK
LOCATION DETAILED: RIGHT PROXIMAL LATERAL CALF
LOCATION DETAILED: LEFT INFERIOR MEDIAL MIDBACK

## 2020-01-29 ASSESSMENT — LOCATION ZONE DERM
LOCATION ZONE: NECK
LOCATION ZONE: LEG
LOCATION ZONE: ARM
LOCATION ZONE: TRUNK

## 2020-07-23 ENCOUNTER — HOSPITAL ENCOUNTER (OUTPATIENT)
Dept: PHYSICAL THERAPY | Age: 65
Discharge: HOME | End: 2020-07-23
Payer: COMMERCIAL

## 2020-07-23 PROCEDURE — 97110 THERAPEUTIC EXERCISES: CPT

## 2020-07-23 PROCEDURE — 97162 PT EVAL MOD COMPLEX 30 MIN: CPT

## 2020-07-23 NOTE — THERAPY EVALUATION
Adele Dixon  : 1955      Payor: [facility] / Plan: [address] / Product Type: O /    [address] [facility] at [address] [place] [address]. Mike ArreolaBao, [address] [place] [address]  [phone]   [phone]              OUTPATIENT PHYSICAL THERAPY:Initial Assessment 2020  Visit # 1   ICD-10: Treatment Diagnosis:   Pain in left elbow (M25.522)  Stiffness of left elbow, not elsewhere classified (M25.622)  Medial epicondylitis, left elbow (M77.02)                  Precautions/Allergies:   Levaquin [levofloxacin]; Cellcept [mycophenolate mofetil]; and Other medication   Fall Risk Score: 0 (? 5 = High Risk)  MD Orders: Eval and Treat  MEDICAL/REFERRING DIAGNOSIS:  Medial epicondylitis, left elbow [M77.02]   DATE OF ONSET: 2 years  REFERRING PHYSICIAN: Maurisio Williamson MD  RETURN PHYSICIAN APPOINTMENT: TBD by patient      INITIAL ASSESSMENT:  Ms. Adele Dixon has attended 1 physical therapy session including initial evaluation. Ms. Duran Laguerre presents with decreased functional use, strength and range of motion of her left elbow and upper extremity that is affecting her independence with activities of daily living and ability to perform job tasks. I feel that Ms. Duran Laguerre will benefit from skilled physical therapy to maximize the functional use of her elbow and upper extremity in daily activities and work tasks. Adele Dixon will benefit from skilled PT (medically necessary) to address above deficits affecting participation in basic ADLs and overall functional tolerance. PROBLEM LIST (Impacting functional limitations):  · Decreased Strength  · Decreased ADL/Functional Activities  · Decreased Transfer Abilities  · Increased Pain  · Decreased Activity Tolerance  · Decreased Flexibility/Joint Mobility  · Decreased [place] with Home Exercise Program INTERVENTIONS PLANNED:  1. Bed Mobility  2. Cold  3. Cryotherapy  4.  Family Education  5. Home Exercise Program (HEP)  6. Manual Therapy  7. Neuromuscular Re-education/Strengthening  8. Range of Motion (ROM)  9. Therapeutic Activites  10. Therapeutic Exercise/Strengthening  11. Transfer Training  12. Ultrasound  13. Paraffin  14. Splinting         TREATMENT PLAN:  Effective Dates: 7/23/2020 TO 9/21/2020 (60 days). Frequency/Duration: 2 times a week for 60 Days  GOALS: (Goals have been discussed and agreed upon with patient.)   Short-Term Goals~4 weeks  Goal Met   1. Hope Grist will decrease pain to 3   to allow patient to perform self care tasks. 1.  [] Date:   2. Hope Grist will increase motion in left elbow  by 10 degrees to improve functional use of upper extremity in ADL activities. 2.  [] Date:   3. Hope Grist will Increase strength in  by 10 pounds to allow patient to  and lift objects during self care activities. 3.  [] Date:   4. Hope Grist will improve DASH score by 10 pounds 4. [] Date:   5      Long Term Goals~8 weeks Goal Met   1. Hope Grist will Decrease pain to 2 to allow patient to perform all household and work tasks 1. [] Date:   2. Hope Grist will Increase motion in left elbow to WNL to allow patient to perform all ADL activities. 2.  [] Date:   3. Hope Grist will Increase strength in left hand by 15 pounds to allow patient to , lift, hold, and carry heavy objects 3. [] Date:   4. Hope Grist will  4. [] Date:            Outcome Measure: Tool Used: Disabilities of the Arm, Shoulder and Hand (DASH) Questionnaire - Quick Version  Score:  Initial: 31/55  Most Recent: X/55 (Date: -- )   Interpretation of Score: The DASH is designed to measure the activities of daily living in person's with upper extremity dysfunction or pain. Each section is scored on a 1-5 scale, 5 representing the greatest disability.   The scores of each section are added together for a total score of 55. Medical Necessity:   · Skilled intervention required due to deficits and impairments seen upon initial evaluation affecting patient's participation in ADLs and functional tasks. *  ·   Reason for Services/Other Comments:  · Patient requires skilled intervention due to deficits and impairments seen upon initial evaluation affecting patient's participation in ADLs and functional tasks. No future appointments. Total Treatment Duration:      PT Patient Time In/Time Out  Time In: 1415  Time Out: 1500  Ayanna Rose, RT  Rehabilitation Potential For Stated Goals: Good  Regarding Adwoa Valdes Taz's therapy, I certify that the treatment plan above will be carried out by a therapist or under their direction. Thank you for this referral,  Anil Cam,        Referring Physician Signature: Evette Hoyos MD _________________________  Date _________               HISTORY:   History of Present Injury/Illness (Reason for Referral):  Gradual onset was moving chairs at school hurt elbow. Teaches elementary school music. Two cortisone shots last one on 3/6/20. Only temporary relief. Has had acupuncture for 5 visits. Feels that she is maybe 50% over the last month. Had therapy for rotator cuff pathology last fall with great results. No neck or shoulder pain. Pain Scale: burning pain, twinges, pain medial epicondyle radiates into forearm, not to fingers. Has trigger finger right middle finger.   Current: 8/10  Best:  2/10  Worst:  9/10    · Aggravating factors: tool use, push/pull, lifting, cooking, carrying and opening a jar  · Relieving factors: rest and ice  · Irritability: Medium (Onset of pain is equal to alleviation)      Past Medical History/Comorbidities:   Ms. Radha Dominique  has a past medical history of Allergic rhinitis, cause unspecified (7/1/2015), Anxiety, Anxiety, BPPV (benign paroxysmal positional vertigo), Chronic pain, Closed fracture of medial malleolus, Ear problems, Enthesopathy of ankle and tarsus, unspecified, Esophageal reflux (2015), GERD (gastroesophageal reflux disease), Gestational diabetes (), Hallux valgus (acquired), and History of gestational diabetes. Ms. Joe Gonzalez  has a past surgical history that includes hx bunionectomy (Right); hx  section (, ); hx appendectomy (); hx endoscopy (2017); hx colonoscopy (2017); hx abdominal wall defect repair; hx orthopaedic (Left, ); and hx orthopaedic (Right, early ). Social History/Living Environment:     lives alone  Prior Level of Function/Work/Activity:            Ambulatory/Rehab Services H2 Model Falls Risk Assessment    Risk Factors:       No Risk Factors Identified Ability to Rise from Chair:       (0)  Ability to rise in a single movement    Falls Prevention Plan:       No modifications necessary   Total: (5 or greater = High Risk): 0     [facility] [employer]. All Rights Reserved. [facility] Patent #[id number]. Federal Law prohibits the replication, distribution or use without written permission from [facility]         Current Medications:    Current Outpatient Medications:     calcium carbonate (Calcium 600) 600 mg calcium (1,500 mg) tablet, Take 600 mg by mouth two (2) times a day., Disp: , Rfl:     meclizine (ANTIVERT) 25 mg tablet, Take 1 Tab by mouth daily as needed for Dizziness. , Disp: 20 Tab, Rfl: 5    cyanocobalamin (VITAMIN B-12) 1,000 mcg sublingual tablet, Take 1,000 mcg by mouth daily. , Disp: , Rfl:     cholecalciferol (VITAMIN D3) 400 unit tab tablet, Take  by mouth daily. , Disp: , Rfl:     ARCH SUPPORT ORTHOTIC misc, Full sole orthodics and extra depth shoes, Disp: 1 Each, Rfl: 0    aspirin delayed-release 81 mg tablet, Take  by mouth daily. , Disp: , Rfl:     b complex vitamins tablet, Take 1 Tab by mouth daily. , Disp: , Rfl:     busPIRone (BUSPAR) 10 mg tablet, Take 1 Tab by mouth three (3) times daily as needed. (Patient taking differently: Take 10 mg by mouth three (3) times daily as needed. Patient takes 5 mg , prn), Disp: 60 Tab, Rfl: 0    ascorbic acid, vitamin C, (VITAMIN C) 500 mg tablet, Take  by mouth., Disp: , Rfl:     multivitamin (ONE A DAY) tablet, Take 1 Tab by mouth daily. , Disp: , Rfl:     Biotin 2,500 mcg cap, Take  by mouth., Disp: , Rfl:     potassium 99 mg tablet, Take 99 mg by mouth daily. , Disp: , Rfl:     Cetirizine 10 mg cap, Take  by mouth., Disp: , Rfl:     OTHER, Indications: Zatador eye drops as needed, Disp: , Rfl:     fluticasone (FLONASE) 50 mcg/actuation nasal spray, daily. , Disp: , Rfl:     ranitidine (ZANTAC) 150 mg tablet, Take 150 mg by mouth daily as needed for Indigestion. , Disp: , Rfl:     SOY ISOFLA/BLK COHOSH/MAG BARK (ESTROVEN PO), Take 1 Tab by mouth daily. , Disp: , Rfl:      Date Last Reviewed:  7/23/2020   Number of Personal Factors/Comorbidities that affect the Plan of Care: 1-2: MODERATE COMPLEXITY   EXAMINATION:   Observation/Orthostatic Postural Assessment:          Rounded shoulders  Palpation:          Pain light palpation left medial epicondyle        Active Range of Motion  7/23/2020     Fingers     Normal      Thumb     Normal    Wrist and Elbow      Right  Left   Elbow Flexion 150 140   Elbow Extension 0 -12   Wrist Extension     Wrist Flexion     Ulnar Deviation     Radial Deviation     Supination 90 80   Pronation 90 80   Shoulder flexion 165 145   IR /ER T6/T2 T8/T1                     Passive Wrist and Elbow      Right  Left   Elbow Flexion  145   Elbow Extension  10      Strength  Date: 7/23/2020       Right  Left   Elbow Flexion 5 5-   Elbow Extension 5 3+   Wrist Extension 5 3+   Wrist Flexion  5 3+ Pain   Supination 5 4   Pronation 5 4         40  28 pain 6/10          Neurological Screen: Assessed @ Initial Visit    Radiating symptoms? Yes complaints of pain radiating from medial epicondyle to wrist flexor muscle bellies.     Special Test: Tinel's at elbow: Negative    Functional Mobility:  Assessed @ Initial Visit        Body Structures Involved:      1. Muscle Body Functions Affected:  1. Sensory/Pain  2. Neuromusculoskeletal  3. Movement Related Activities and Participation Affected:  1. Mobility  2.  Self Care   Number of elements that affect the Plan of Care: 3: MODERATE COMPLEXITY   CLINICAL PRESENTATION:   Presentation: Evolving clinical presentation with changing clinical characteristics: MODERATE COMPLEXITY   CLINICAL DECISION MAKING:      Use of outcome tool(s) and clinical judgement create a POC that gives a: Questionable prediction of patient's progress: MODERATE COMPLEXITY                  remember to save as eval

## 2020-07-23 NOTE — PROGRESS NOTES
Christiana Mathews  : 1955  Payor: [facility] / Plan: [address] / Product Type: PPO /  [address] at [address]. [facility], [address] [place] [address], [facility] [place], [address] [place] [address]  [phone]   [phone]                                                          Lonza Essex Starlette Poplar, MD      OUTPATIENT PHYSICAL THERAPY: Daily Treatment Note 2020 Visit Count:  1    Tx Diagnosis ICD-10: Treatment Diagnosis:   Pain in left elbow (M25.522)  Stiffness of left elbow, not elsewhere classified (M25.622)  Medial epicondylitis, left elbow (M77.02)         Pre-treatment Symptoms/Complaints:  See Initial Eval Dated 20 for more details. Pain: Initial:4/10 Post Session: 6/10   Medications Last Reviewed:  2020     Updated Objective Findings: See Initial Eval for more details. TREATMENT:   THERAPEUTIC EXERCISE: (15 minutes):  Exercises per grid below to improve mobility, strength and balance. Required minimal visual, verbal and manual cues to promote proper body alignment and promote proper body posture. Progressed resistance and complexity of movement as indicated. Date:  2020 Date:   Date:     Activity/Exercise Parameters Parameters Parameters   Education HEP, POC, PT goals, anatomy/pathology     Isometric wrist flexion 45 on 5 x 2# dumbell hold     Bilateral ER holds  45 sec on 5 x                                    THERAPEUTIC ACTIVITY: ( 0 minutes): Activities per gid below to improve functional movement related mobility, strength and balance to improve neuro-muscular carryover to daily functional activities for improving patient's quality of life. Required visual, verbal and manual cues to promote proper body alignment and promote proper body posture/mechanics. Progressed resistance and complexity of movement as indicated.      Date:  2020 Date:   Date:     Activity/Exercise Parameters Parameters Parameters                     Shayne shah                                                     MANUAL THERAPY: (0 minutes): Joint mobilization, Soft tissue mobilization was utilized and necessary because of the patient's restricted joint motion and restricted motion of soft tissue mobility. Date  7/23/2020    Technique Used Grade  Level # Time(s) Effect while being performed                                                                 HEP Log Date 1. Isometrics wrist flexion 7/23/2020   2. Isometrics bilateral shoulder ER 7/23/2020   3. 7/23/2020   4.    5.           [employer] Portal  Treatment/Session Summary:    Response to Treatment: Pt demonstrated understanding of POC and initial HEP. No increase in pain or adverse reactions. Communication/Consultation:  POC, HEP, PT goals, Faxed initial evaluation to MD.   Equipment provided today: HEP Handout     Recommendations/Intent for next treatment session:   Next visit will focus on soft tissue mobilization graded exposure to resistance/tendon loading. Treatment Plan of Care Effective Dates: 7/23/2020 TO 9/21/2020 (60 days). Frequency/Duration: 2 times a week for 60 Days             Total Treatment Billable Duration:   15  Rx plus Eval   PT Patient Time In/Time Out  Time In: 1415  Time Out: 1500  Ayanna Rose, RT    No future appointments.

## 2020-07-27 ENCOUNTER — HOSPITAL ENCOUNTER (OUTPATIENT)
Dept: PHYSICAL THERAPY | Age: 65
Discharge: HOME | End: 2020-07-27
Payer: COMMERCIAL

## 2020-07-27 PROCEDURE — 97110 THERAPEUTIC EXERCISES: CPT

## 2020-07-27 PROCEDURE — 97140 MANUAL THERAPY 1/> REGIONS: CPT

## 2020-07-27 NOTE — PROGRESS NOTES
Amy Bullard  : 1955  Payor: [place] [employer] / Plan: [address] / Product Type: PPO /  [address] at [facility] [address] [place] [address], [address] [place] [address]  [phone]   [phone]                                                          [facility] Theresa Haro MD      OUTPATIENT PHYSICAL THERAPY: Daily Treatment Note 2020 Visit Count:  2    Tx Diagnosis ICD-10: Treatment Diagnosis:   Pain in left elbow (M25.522)  Stiffness of left elbow, not elsewhere classified (M25.622)  Medial epicondylitis, left elbow (M77.02)         Pre-treatment Symptoms/Complaints:  pain can be an 8  Pain: Initial:4/10 Post Session: 6/10   Medications Last Reviewed:  2020     Updated Objective Findings: elbow ext/flex 5/140        TREATMENT:   THERAPEUTIC EXERCISE: (30 minutes):  Exercises per grid below to improve mobility, strength and balance. Required minimal visual, verbal and manual cues to promote proper body alignment and promote proper body posture. Progressed resistance and complexity of movement as indicated. Date:  2020 Date:  20 Date:     Activity/Exercise Parameters Parameters Parameters   Education HEP, POC, PT goals, anatomy/pathology Pain alarm system, safe pain parameters    Isometric wrist flexion 45 on 5 x 2# dumbell hold     Bilateral ER holds  45 sec on 5 x      UBE   3/3 L3    Wall slides  10 x    scaption  2# 1 x 10  3# 1 x 10    Farmer carry   10# L, 15# R 280'    Rows   13# 2 x 10                 THERAPEUTIC ACTIVITY: ( 0 minutes): Activities per gid below to improve functional movement related mobility, strength and balance to improve neuro-muscular carryover to daily functional activities for improving patient's quality of life. Required visual, verbal and manual cues to promote proper body alignment and promote proper body posture/mechanics. Progressed resistance and complexity of movement as indicated. Date:  7/27/2020 Date:   Date:     Activity/Exercise Parameters Parameters Parameters                                                                               MANUAL THERAPY: (10 minutes): Joint mobilization, Soft tissue mobilization was utilized and necessary because of the patient's restricted joint motion and restricted motion of soft tissue mobility. Date  7/27/2020    Technique Used Grade  Level # Time(s) Effect while being performed   Soft tissue mobilization IASTM   10 min Left forearm emphasis on wrist flexors                                                          HEP Log Date 1. Isometrics wrist flexion 7/27/2020   2. Isometrics bilateral shoulder ER 7/27/2020   3. 7/27/2020   4.    5.           [employer] Portal  Treatment/Session Summary:    Response to Treatment: Pt demonstrated understanding of POC and initial HEP. No increase in pain or adverse reactions. Communication/Consultation:  POC, HEP, PT goals, Faxed initial evaluation to MD.   Equipment provided today: HEP Handout     Recommendations/Intent for next treatment session:   Next visit will focus on soft tissue mobilization graded exposure to resistance/tendon loading. Treatment Plan of Care Effective Dates: 7/27/2020 TO 9/21/2020 (60 days).   Frequency/Duration: 2 times a week for 60 Days             Total Treatment Billable Duration:   15  Rx plus Eval   PT Patient Time In/Time Out  Time In: 8917  Time Out: [address] RT    Future Appointments   Date Time Provider Manisha Alfaro   7/30/2020 10:45 AM Mark Rose, RT [facility] AND HOME [facility] [place]   8/3/2020  8:15 AM [facility]   8/6/2020  8:15 AM Mark Rose, RT [facility]   8/10/2020  4:15 PM [facility]   8/13/2020  4:15 PM [facility]   8/17/2020  4:15 PM [facility]   8/20/2020  4:15 PM Eunice Waitet [facility]   8/24/2020  5:15 PM Lexis Rose[facility]

## 2020-07-30 ENCOUNTER — HOSPITAL ENCOUNTER (OUTPATIENT)
Dept: PHYSICAL THERAPY | Age: 65
Discharge: HOME | End: 2020-07-30
Payer: COMMERCIAL

## 2020-07-30 PROCEDURE — 97140 MANUAL THERAPY 1/> REGIONS: CPT

## 2020-07-30 PROCEDURE — 97110 THERAPEUTIC EXERCISES: CPT

## 2020-07-30 NOTE — PROGRESS NOTES
Jackie Mcmillan  : 1955  Payor: [facility] / Plan: [address] / Product Type: O /  [address] at [address] [facility]. Adeline Marrufo, [address], [address] [place] [address]  [phone]   [phone]                                                          Cammie Garcia MD      OUTPATIENT PHYSICAL THERAPY: Daily Treatment Note 2020 Visit Count:  3    Tx Diagnosis ICD-10: Treatment Diagnosis:   Pain in left elbow (M25.522)  Stiffness of left elbow, not elsewhere classified (M25.622)  Medial epicondylitis, left elbow (M77.02)         Pre-treatment Symptoms/Complaints: pain less  Pain: Initial:4/10 Post Session: 4/10   Medications Last Reviewed:  2020     Updated Objective Findings: elbow ext/flex 5/        TREATMENT:   THERAPEUTIC EXERCISE: (30 minutes):  Exercises per grid below to improve mobility, strength and balance. Required minimal visual, verbal and manual cues to promote proper body alignment and promote proper body posture. Progressed resistance and complexity of movement as indicated. Date:  2020 Date:  20 Date:  20   Activity/Exercise Parameters Parameters Parameters   Education HEP, POC, PT goals, anatomy/pathology Pain alarm system, safe pain parameters    Isometric wrist flexion 45 on 5 x 2# dumbell hold     Bilateral ER holds  45 sec on 5 x      UBE   3/3 L3 L 3.6 8 min   Wall slides  10 x 10 x with lift off at top   scaption  2# 1 x 10  3# 1 x 10 3# 2 x 10    Farmer carry   10# L, 15# R 280' 15# 280'   Rows   13# 2 x 10  20# 2 x 10   Overhead press   5 x barbell 10#  10 x dumbells 4#   Bilateral shoulder ER with band   2 x 10 ea blue   Left flexor stretch on door    Wrist flexors 5 x 5         THERAPEUTIC ACTIVITY: ( 0 minutes):  Activities per gid below to improve functional movement related mobility, strength and balance to improve neuro-muscular carryover to daily functional activities for improving patient's quality of life. Required visual, verbal and manual cues to promote proper body alignment and promote proper body posture/mechanics. Progressed resistance and complexity of movement as indicated. Date:  7/30/2020 Date:   Date:     Activity/Exercise Parameters Parameters Parameters                                                                               MANUAL THERAPY: (10 minutes): Joint mobilization, Soft tissue mobilization was utilized and necessary because of the patient's restricted joint motion and restricted motion of soft tissue mobility. Date  7/30/2020    Technique Used Grade  Level # Time(s) Effect while being performed   Soft tissue mobilization IASTM   10 min Left forearm emphasis on wrist flexors                                                          HEP Log Date 1. Isometrics wrist flexion 7/23/20   2. Isometrics bilateral shoulder ER 7/23/20   3.scaption, wallslides, shrug 7/27/20   4. shoulder press, door way stretch 7/30/20   5. [employer] Portal  Treatment/Session Summary:    Response to Treatment: Pt demonstrated understanding of POC and initial HEP. No increase in pain or adverse reactions. Communication/Consultation:  continue HEP   Equipment provided today: HEP Handout     Recommendations/Intent for next treatment session:   Next visit will focus on soft tissue mobilization graded exposure to resistance/tendon loading. Treatment Plan of Care Effective Dates: 7/30/2020 TO 9/21/2020 (60 days).   Frequency/Duration: 2 times a week for 60 Days             Total Treatment Billable Duration:   15  Rx plus Eval   PT Patient Time In/Time Out  Time In: 1305  Time Out: [address], RT    Future Appointments   Date Time Provider Manisha Kwoni   8/3/2020  8:15 AM Karlo Rose RT [facility]   8/6/2020  8:15 AM Karlo Rose RT [facility]   8/10/2020  4:15 PM Nataly Walker [facility]   8/13/2020  4:15 PM Shakira Cook N [facility]   8/17/2020  4:15 PM Shakirasa Cook N [facility]   8/20/2020  4:15 PM Shakirasa Cook N [facility]   8/24/2020  5:15 PM Giovanni Rose,  [facility]

## 2020-08-03 ENCOUNTER — HOSPITAL ENCOUNTER (OUTPATIENT)
Dept: PHYSICAL THERAPY | Age: 65
Discharge: HOME | End: 2020-08-03
Payer: COMMERCIAL

## 2020-08-03 PROCEDURE — 97140 MANUAL THERAPY 1/> REGIONS: CPT

## 2020-08-03 PROCEDURE — 97110 THERAPEUTIC EXERCISES: CPT

## 2020-08-03 NOTE — PROGRESS NOTES
Catarino Altamirano  : 1955  Payor: [facility] / Plan: [address] / Product Type: PPO /  [address] at [address]. [facility], [address], [address]  [phone]   [phone]                                                          Michele Bosworth Cyrus Ester, MD      OUTPATIENT PHYSICAL THERAPY: Daily Treatment Note 8/3/2020 Visit Count:  4    Tx Diagnosis ICD-10: Treatment Diagnosis:   Pain in left elbow (M25.522)  Stiffness of left elbow, not elsewhere classified (M25.622)  Medial epicondylitis, left elbow (M77.02)         Pre-treatment Symptoms/Complaints: hurts to straighten elbow out all the way  Pain: Initial:4/10 Post Session: 4/10   Medications Last Reviewed:  8/3/2020     Updated Objective Findings: elbow ext/flex 0/140        TREATMENT:   THERAPEUTIC EXERCISE: (30 minutes):  Exercises per grid below to improve mobility, strength and balance. Required minimal visual, verbal and manual cues to promote proper body alignment and promote proper body posture. Progressed resistance and complexity of movement as indicated.      Date:  2020 Date:  20 Date:  7/30/20 8/3/20   Activity/Exercise Parameters Parameters Parameters    Education HEP, POC, PT goals, anatomy/pathology Pain alarm system, safe pain parameters     Isometric wrist flexion 45 on 5 x 2# dumbell hold      Bilateral ER holds  45 sec on 5 x       UBE   3/3 L3 L 3.6 8 min 3/3 6 min   Wall slides  10 x 10 x with lift off at top 2 x 10   scaption  2# 1 x 10  3# 1 x 10 3# 2 x 10  4# 1 x 10  3# 1 x 10   Farmer carry   10# L, 15# R 280' 15# 280' 15# 26'   Rows   13# 2 x 10  20# 2 x 10 20# 2 x 10   Overhead press   5 x barbell 10#  10 x dumbells 4# 10 x  2   7.5#    Bilateral shoulder ER with band   2 x 10 ea blue 2 x 10 ER/IR bilateral   Left flexor stretch on door    Wrist flexors 5 x 5    Wall pushup    10 x   Active elbow and wrist ext     20 x         THERAPEUTIC ACTIVITY: ( 0 minutes): Activities per gid below to improve functional movement related mobility, strength and balance to improve neuro-muscular carryover to daily functional activities for improving patient's quality of life. Required visual, verbal and manual cues to promote proper body alignment and promote proper body posture/mechanics. Progressed resistance and complexity of movement as indicated. Date:  8/3/2020 Date:   Date:     Activity/Exercise Parameters Parameters Parameters                                                                               MANUAL THERAPY: (10 minutes): Joint mobilization, Soft tissue mobilization was utilized and necessary because of the patient's restricted joint motion and restricted motion of soft tissue mobility. Date  8/3/2020    Technique Used Grade  Level # Time(s) Effect while being performed   Soft tissue mobilization IASTM   10 min Left forearm emphasis on wrist flexors                                                          HEP Log Date 1. Isometrics wrist flexion 7/23/20   2. Isometrics bilateral shoulder ER 7/23/20   3.scaption, wallslides, shrug 7/27/20   4. shoulder press, door way stretch 7/30/20   5. [employer] Portal  Treatment/Session Summary:    Response to Treatment: Guarding at intial attempts to straighten elbow but with coaxing can achieve full elbow extension with normal endfeel   Communication/Consultation:  continue HEP frequent active combined elbow wrist extension   Equipment provided today: HEP Handout     Recommendations/Intent for next treatment session:   Next visit will focus on soft tissue mobilization graded exposure to resistance/tendon loading. Treatment Plan of Care Effective Dates: 8/3/2020 TO 9/21/2020 (60 days).   Frequency/Duration: 2 times a week for 60 Days             Total Treatment Billable Duration:  40 min  PT Patient Time In/Time Out  Time In: 0815  Time Out: 0900  [place], RT    Future Appointments   Date Time Provider Manisha Kwoni   8/6/2020  8:15 AM Kirk Rose, RT [facility]   8/10/2020  4:15 PM Alexa Moons N [facility]   8/13/2020  4:15 PM Alexa Moons N [facility]   8/17/2020  4:15 PM Alexa Moons N [facility]   8/20/2020  4:15 PM Alexa Moons N [facility]   8/24/2020  5:15 PM Kirk Rose, RT [facility]

## 2020-08-06 ENCOUNTER — HOSPITAL ENCOUNTER (OUTPATIENT)
Dept: PHYSICAL THERAPY | Age: 65
Discharge: HOME | End: 2020-08-06
Payer: COMMERCIAL

## 2020-08-06 PROCEDURE — 97140 MANUAL THERAPY 1/> REGIONS: CPT

## 2020-08-06 PROCEDURE — 97110 THERAPEUTIC EXERCISES: CPT

## 2020-08-06 NOTE — PROGRESS NOTES
Renetta Celeste  : 1955  Payor: [facility] / Plan: [address] / Product Type: PPO /  [address]. [facility], [address] [place] [address], [facility], [address] [place] [address]  [phone]   [phone]                                                          Juan Landaverde MD      OUTPATIENT PHYSICAL THERAPY: Daily Treatment Note 2020 Visit Count:  5    Tx Diagnosis ICD-10: Treatment Diagnosis:   Pain in left elbow (M25.522)  Stiffness of left elbow, not elsewhere classified (M25.622)  Medial epicondylitis, left elbow (M77.02)         Pre-treatment Symptoms/Complaints: was able to sleep on arm last night. Pain: Initial:4/10 Post Session: 4/10   Medications Last Reviewed:  2020     Updated Objective Findings: elbow ext/flex 0/140        TREATMENT:   THERAPEUTIC EXERCISE: (30 minutes):  Exercises per grid below to improve mobility, strength and balance. Required minimal visual, verbal and manual cues to promote proper body alignment and promote proper body posture. Progressed resistance and complexity of movement as indicated.      Date:  2020 Date:  20 Date:  7/30/20 8/3/20 8/6/20   Activity/Exercise Parameters Parameters Parameters     Education HEP, POC, PT goals, anatomy/pathology Pain alarm system, safe pain parameters      Isometric wrist flexion 45 on 5 x 2# dumbell hold       Bilateral ER holds  45 sec on 5 x        UBE   3/3 L3 L 3.6 8 min 3/3 6 min 3/3 3.6    Wall slides  10 x 10 x with lift off at top 2 x 10 2 x 10    scaption  2# 1 x 10  3# 1 x 10 3# 2 x 10  4# 1 x 10  3# 1 x 10 3 x 5 4#   Farmer carry   10# L, 15# R 280' 15# 280' 15# 280' 15# 36'   Rows   13# 2 x 10  20# 2 x 10 20# 2 x 10 23# 5 x 3   Overhead press   5 x barbell 10#  10 x dumbells 4# 10 x  2   7.5#  5 x 3 8#1   Bilateral shoulder ER with band   2 x 10 ea blue 2 x 10 ER/IR bilateral 2 x 10 bilat green band   Left flexor stretch on door    Wrist flexors 5 x 5 Wall pushup    10 x 2 x 5    Active elbow and wrist ext     20 x    Bicep curls     3# 2 x 5         THERAPEUTIC ACTIVITY: ( 0 minutes): Activities per gid below to improve functional movement related mobility, strength and balance to improve neuro-muscular carryover to daily functional activities for improving patient's quality of life. Required visual, verbal and manual cues to promote proper body alignment and promote proper body posture/mechanics. Progressed resistance and complexity of movement as indicated. Date:  8/6/2020 Date:   Date:     Activity/Exercise Parameters Parameters Parameters                                                                               MANUAL THERAPY: (10 minutes): Joint mobilization, Soft tissue mobilization was utilized and necessary because of the patient's restricted joint motion and restricted motion of soft tissue mobility. Date  8/6/2020    Technique Used Grade  Level # Time(s) Effect while being performed   Soft tissue mobilization IASTM   10 min Left forearm emphasis on wrist flexors                                                          HEP Log Date 1. Isometrics wrist flexion 7/23/20   2. Isometrics bilateral shoulder ER 7/23/20   3.scaption, wallslides, shrug 7/27/20   4. shoulder press, door way stretch 7/30/20   5. [employer] Portal  Treatment/Session Summary:    Response to Treatment:   can achieve full elbow extension with normal endfeel   Communication/Consultation:  continue HEP frequent active combined elbow wrist extension   Equipment provided today: HEP Handout     Recommendations/Intent for next treatment session:   Next visit will focus on soft tissue mobilization graded exposure to resistance/tendon loading. Treatment Plan of Care Effective Dates: 8/6/2020 TO 9/21/2020 (60 days).   Frequency/Duration: 2 times a week for 60 Days             Total Treatment Billable Duration:  40 min  PT Patient Time In/Time Out  Time In: 0820  Time Out: 0900  Nina Mendoza, RT    Future Appointments   Date Time Provider Manisha Alfaro   8/10/2020  5:00 PM Macy Rose, [facility]   8/13/2020  4:15 PM Mike Mora N [facility]   8/17/2020  4:15 PM Macy Rose, RT [facility]   8/21/2020 10:00 AM Macy Rose, RT [facility]   8/31/2020  4:00 PM Macy Rose, RT [facility]

## 2020-08-14 ENCOUNTER — HOSPITAL ENCOUNTER (OUTPATIENT)
Dept: PHYSICAL THERAPY | Age: 65
Discharge: HOME | End: 2020-08-14
Payer: COMMERCIAL

## 2020-08-14 PROCEDURE — 97110 THERAPEUTIC EXERCISES: CPT

## 2020-08-14 NOTE — PROGRESS NOTES
Aleksandra Ellis  : 1955  Payor: [facility] / Plan: [address] / Product Type: O /  [address] [facility] at [address] [place] [address]. [facility], [address] [place] [address]  [phone]   [phone]                                                          Derik Hays MD      OUTPATIENT PHYSICAL THERAPY: Daily Treatment Note 2020 Visit Count:  6    Tx Diagnosis ICD-10: Treatment Diagnosis:   Pain in left elbow (M25.522)  Stiffness of left elbow, not elsewhere classified (M25.622)  Medial epicondylitis, left elbow (M77.02)         Pre-treatment Symptoms/Complaints:was able to lift multiple 30# boxes of computers yesterday. Has to leave a little early for school today. Pain: Initial:4/10 Post Session: 4/10   Medications Last Reviewed:  2020     Updated Objective Findings: elbow ext/flex 0/140     right 47 lbs, Left 42 lbs        TREATMENT:   THERAPEUTIC EXERCISE: (40 minutes):  Exercises per grid below to improve mobility, strength and balance. Required minimal visual, verbal and manual cues to promote proper body alignment and promote proper body posture. Progressed resistance and complexity of movement as indicated.      Date:  2020 Date:  20 Date:  7/30/20 8/3/20 8/6/20 8/10/20 8//14/20   Activity/Exercise Parameters Parameters Parameters       Education HEP, POC, PT goals, anatomy/pathology Pain alarm system, safe pain parameters        Isometric wrist flexion 45 on 5 x 2# dumbell hold         Bilateral ER holds  45 sec on 5 x          UBE   3/3 L3 L 3.6 8 min 3/3 6 min 3/3 3.6  3/3/ L6 3/3/ 6 min   Wall slides  10 x 10 x with lift off at top 2 x 10 2 x 10  2 x 10  2 x 10   scaption  2# 1 x 10  3# 1 x 10 3# 2 x 10  4# 1 x 10  3# 1 x 10 3 x 5 4# 3 x 5 5# 3 x 5 6#   Farmer carry   10# L, 15# R 280' 15# 280' 15# 280' 15# 426' 20# 280 20# 350'   Rows   13# 2 x 10  20# 2 x 10 20# 2 x 10 23# 5 x 3 27# 30 x seated  30# 10 x 3 Overhead press   5 x barbell 10#  10 x dumbells 4# 10 x  2   7.5#  5 x 3 8# 5 x 3 8# 7 x 3 9#   Bilateral shoulder ER with band   2 x 10 ea blue 2 x 10 ER/IR bilateral 2 x 10 bilat green band     Left flexor stretch on door    Wrist flexors 5 x 5   5 x  5 x   Wall pushup    10 x 2 x 5  2 x 5 8 x 36\" height table   Active elbow and wrist ext     20 x      Bicep curls     3# 2 x 5 4# 2 x 5 4# 3 x 5   L stretch       5 x          THERAPEUTIC ACTIVITY: ( 0 minutes): Activities per gid below to improve functional movement related mobility, strength and balance to improve neuro-muscular carryover to daily functional activities for improving patient's quality of life. Required visual, verbal and manual cues to promote proper body alignment and promote proper body posture/mechanics. Progressed resistance and complexity of movement as indicated. Date:  8/14/2020 Date:   Date:     Activity/Exercise Parameters Parameters Parameters                                                                               MANUAL THERAPY: (0 minutes): Joint mobilization, Soft tissue mobilization was utilized and necessary because of the patient's restricted joint motion and restricted motion of soft tissue mobility. Date  8/14/2020    Technique Used Grade  Level # Time(s) Effect while being performed   Soft tissue mobilization IASTM   10 min Left forearm emphasis on wrist flexors                                                          HEP Log Date 1. Isometrics wrist flexion 7/23/20   2. Isometrics bilateral shoulder ER 7/23/20   3.scaption, wallslides, shrug 7/27/20   4. shoulder press, door way stretch 7/30/20   5. [employer] Portal  Treatment/Session Summary:    Response to Treatment:   Mild pain complaints with progression of resistance   Communication/Consultation:  continue HEP frequent active combined elbow wrist extension,  increased by 14 lbs from time of evaluation. Equipment provided today: HEP Handout     Recommendations/Intent for next treatment session:   Next visit will focus on soft tissue mobilization graded exposure to resistance/tendon loading. Treatment Plan of Care Effective Dates: 8/14/2020 TO 9/21/2020 (60 days).   Frequency/Duration: 2 times a week for 60 Days             Total Treatment Billable Duration:  40 min  PT Patient Time In/Time Out  Time In: 2238  Time Out: 1190 Amos [place]ron[place], RT    Future Appointments   Date Time Provider Manisha Alfaro   8/17/2020  4:15 PM Cynthia Rose, RT [facility] AND [facility]   8/21/2020 10:00 AM Cynthia Rose, RT [facility]   8/31/2020  4:00 PM Cynthia Rose, RT [facility]

## 2020-08-17 ENCOUNTER — HOSPITAL ENCOUNTER (OUTPATIENT)
Dept: PHYSICAL THERAPY | Age: 65
Discharge: HOME | End: 2020-08-17
Payer: COMMERCIAL

## 2020-08-17 PROCEDURE — 97110 THERAPEUTIC EXERCISES: CPT

## 2020-08-17 NOTE — PROGRESS NOTES
Alejandra Cortes  : 1955  Payor: [facility] / Plan: [address] / Product Type: O /  [address] [facility] at [address] [place] [address]. [facility], [address] [place] [address]  [phone]   [phone]                                                          Kit Nisa Garcia MD      OUTPATIENT PHYSICAL THERAPY: Daily Treatment Note 2020 Visit Count:  7    Tx Diagnosis ICD-10: Treatment Diagnosis:   Pain in left elbow (M25.522)  Stiffness of left elbow, not elsewhere classified (M25.622)  Medial epicondylitis, left elbow (M77.02)         Pre-treatment Symptoms/Complaints:was able to lift multiple 30# boxes of computers yesterday. Has to leave a little early for school today. Pain: Initial:4/10 Post Session: 4/10   Medications Last Reviewed:  2020     Updated Objective Findings: elbow ext/flex 0/140     right 47 lbs, Left 42 lbs        TREATMENT:   THERAPEUTIC EXERCISE: (40 minutes):  Exercises per grid below to improve mobility, strength and balance. Required minimal visual, verbal and manual cues to promote proper body alignment and promote proper body posture. Progressed resistance and complexity of movement as indicated.      Date:  2020 Date:  20 Date:  7/30/20 8/3/20 8/6/20 8/10/20 8//14/20 8/17/20   Activity/Exercise Parameters Parameters Parameters        Education HEP, POC, PT goals, anatomy/pathology Pain alarm system, safe pain parameters         Isometric wrist flexion 45 on 5 x 2# dumbell hold          Bilateral ER holds  45 sec on 5 x           UBE   3/3 L3 L 3.6 8 min 3/3 6 min 3/3 3.6  3/3/ L6 3/3/ 6 min 3/3 6 min   Wall slides  10 x 10 x with lift off at top 2 x 10 2 x 10  2 x 10  2 x 10 2 x 10   scaption  2# 1 x 10  3# 1 x 10 3# 2 x 10  4# 1 x 10  3# 1 x 10 3 x 5 4# 3 x 5 5# 3 x 5 6# 1 x 5]7#, 2 x 5 6#   Farmer carry   10# L, 15# R 280' 15# 280' 15# 280' 15# 426' 20# 280 20# 350' 30 # 5' x 5 L   R 140'   Rows   13# 2 x 10  20# 2 x 10 20# 2 x 10 23# 5 x 3 27# 30 x seated  30# 10 x 3 33# 3 x 10   Overhead press   5 x barbell 10#  10 x dumbells 4# 10 x  2   7.5#  5 x 3 8# 5 x 3 8# 7 x 3 9# 2 x 5 10#   Bilateral shoulder ER with band   2 x 10 ea blue 2 x 10 ER/IR bilateral 2 x 10 bilat green band      Left flexor stretch on door    Wrist flexors 5 x 5   5 x  5 x 10 x   Wall pushup    10 x 2 x 5  2 x 5 8 x 36\" height table 7 x 30\"    Active elbow and wrist ext     20 x       Bicep curls     3# 2 x 5 4# 2 x 5 4# 3 x 5 5# 10 x   L stretch       5 x           THERAPEUTIC ACTIVITY: ( 0 minutes): Activities per gid below to improve functional movement related mobility, strength and balance to improve neuro-muscular carryover to daily functional activities for improving patient's quality of life. Required visual, verbal and manual cues to promote proper body alignment and promote proper body posture/mechanics. Progressed resistance and complexity of movement as indicated. Date:  8/17/2020 Date:   Date:     Activity/Exercise Parameters Parameters Parameters                                                                               MANUAL THERAPY: (5 minutes): Joint mobilization, Soft tissue mobilization was utilized and necessary because of the patient's restricted joint motion and restricted motion of soft tissue mobility. Date  8/17/2020    Technique Used Grade  Level # Time(s) Effect while being performed   Soft tissue mobilization IASTM   5 min Left forearm emphasis on wrist flexors                                                          HEP Log Date 1. Isometrics wrist flexion 7/23/20   2. Isometrics bilateral shoulder ER 7/23/20   3.scaption, wallslides, shrug 7/27/20   4. shoulder press, door way stretch 7/30/20   5.            [employer] Portal  Treatment/Session Summary:    Response to Treatment:   Able to progress all ex today, greatest difficulty with suit case carry on left Communication/Consultation:  continue HEP    Equipment provided today: none     Recommendations/Intent for next treatment session:   Next visit will focus on soft tissue mobilization graded exposure to resistance/tendon loading. Treatment Plan of Care Effective Dates: 8/17/2020 TO 9/21/2020 (60 days).   Frequency/Duration: 2 times a week for 60 Days             Total Treatment Billable Duration:  40 min  PT Patient Time In/Time Out  Time In: 9725  Time Out: 200 [facility], RT    Future Appointments   Date Time Provider Manisha Alfaro   8/17/2020  4:15 PM Papenfuss, Dave Pump, RT [facility]   8/21/2020 10:00 AM Papenfuss, Dave Pump, RT [facility]   8/31/2020  4:00 PM Papenfuss, Dave Pump, RT [facility]

## 2020-08-20 ENCOUNTER — APPOINTMENT (OUTPATIENT)
Dept: PHYSICAL THERAPY | Age: 65
End: 2020-08-20
Payer: COMMERCIAL

## 2020-08-21 ENCOUNTER — HOSPITAL ENCOUNTER (OUTPATIENT)
Dept: PHYSICAL THERAPY | Age: 65
Discharge: HOME | End: 2020-08-21
Payer: COMMERCIAL

## 2020-08-21 PROCEDURE — 97110 THERAPEUTIC EXERCISES: CPT

## 2020-08-21 NOTE — PROGRESS NOTES
Tiffanie Toro  : 1955  Payor: [facility] / Plan: [address] / Product Type: PPO /  [address] at [address]. [facility], [address] [place] [address], [facility], [address] [place] [address]  [phone]   [phone]                                                          Jimmie Tellez MD      OUTPATIENT PHYSICAL THERAPY: Daily Treatment Note 2020 Visit Count:  8    Tx Diagnosis ICD-10: Treatment Diagnosis:   Pain in left elbow (M25.522)  Stiffness of left elbow, not elsewhere classified (M25.622)  Medial epicondylitis, left elbow (M77.02)         Pre-treatment Symptoms/Complaints:was able to lift multiple 30# boxes of computers yesterday. Has to leave a little early for school today. Pain: Initial:4/10 Post Session: 4/10   Medications Last Reviewed:  2020     Updated Objective Findings: elbow ext/flex 0/140     right 55 lbs, Left 43 lbs        TREATMENT:   THERAPEUTIC EXERCISE: (45 minutes):  Exercises per grid below to improve mobility, strength and balance. Required minimal visual, verbal and manual cues to promote proper body alignment and promote proper body posture. Progressed resistance and complexity of movement as indicated.      Date:  2020 Date:  20 Date:  7/30/20 8/3/20 8/6/20 8/10/20 8//14/20 8/17/20 8/21/20   Activity/Exercise Parameters Parameters Parameters         Education HEP, POC, PT goals, anatomy/pathology Pain alarm system, safe pain parameters          Isometric wrist flexion 45 on 5 x 2# dumbell hold           Bilateral ER holds  45 sec on 5 x            UBE   3/3 L3 L 3.6 8 min 3/3 6 min 3/3 3.6  3/3/ L6 3/3/ 6 min 3/3 6 min 3/3 6 min 5.5    Wall slides  10 x 10 x with lift off at top 2 x 10 2 x 10  2 x 10  2 x 10 2 x 10 2 x 10   scaption  2# 1 x 10  3# 1 x 10 3# 2 x 10  4# 1 x 10  3# 1 x 10 3 x 5 4# 3 x 5 5# 3 x 5 6# 1 x 5]7#, 2 x 5 6# 3 x 5 7#   Farmer carry   10# L, 15# R 280' 15# 280' 15# 280' 15# 426' 20# 280 20# 350' 30 # 5' x 5 L   R 140' 30# 39' x 2 ea arm   Rows   13# 2 x 10  20# 2 x 10 20# 2 x 10 23# 5 x 3 27# 30 x seated  30# 10 x 3 33# 3 x 10 Standing single bent over 8# 2 x 10 ea   Overhead press   5 x barbell 10#  10 x dumbells 4# 10 x  2   7.5#  5 x 3 8# 5 x 3 8# 7 x 3 9# 2 x 5 10# 2 x 5 12#   Bilateral shoulder ER with band   2 x 10 ea blue 2 x 10 ER/IR bilateral 2 x 10 bilat green band       Left flexor stretch on door    Wrist flexors 5 x 5   5 x  5 x 10 x 2 x 10 5#   Wall pushup    10 x 2 x 5  2 x 5 8 x 36\" height table 7 x 30\"     Active elbow and wrist ext     20 x        Bicep curls     3# 2 x 5 4# 2 x 5 4# 3 x 5 5# 10 x 5# 2 x 10, 12# eccentric with bar 10 x   L stretch       5 x   5x   Dead lift          5 x 1    Chair squat kettle bell          15# 10 x    Ball slam 4#         2 x 10          THERAPEUTIC ACTIVITY: ( 0 minutes): Activities per gid below to improve functional movement related mobility, strength and balance to improve neuro-muscular carryover to daily functional activities for improving patient's quality of life. Required visual, verbal and manual cues to promote proper body alignment and promote proper body posture/mechanics. Progressed resistance and complexity of movement as indicated. Date:  8/21/2020 Date:   Date:     Activity/Exercise Parameters Parameters Parameters                                                                               MANUAL THERAPY: ( minutes): Joint mobilization, Soft tissue mobilization was utilized and necessary because of the patient's restricted joint motion and restricted motion of soft tissue mobility. Date  8/21/2020    Technique Used Grade  Level # Time(s) Effect while being performed   Soft tissue mobilization IASTM   5 min Left forearm emphasis on wrist flexors                                                          HEP Log Date 1. Isometrics wrist flexion 7/23/20   2.    Isometrics bilateral shoulder ER 7/23/20   3.scaption, wallslides, shrug 7/27/20   4. shoulder press, door way stretch 7/30/20   5. [employer] Portal  Treatment/Session Summary:    Response to Treatment:   Able to progress all ex today, no pain with suit case carry today   Communication/Consultation:  continue HEP    Equipment provided today: none     Recommendations/Intent for next treatment session:   Next visit will focus on re-evaluation, review of HEP            Treatment Plan of Care Effective Dates: 8/21/2020 TO 9/21/2020 (60 days).   Frequency/Duration: 2 times a week for 60 Days             Total Treatment Billable Duration:  45 min  PT Patient Time In/Time Out  Time In: 1000  Time Out: [address], RT    Future Appointments   Date Time Provider Manisha Alfaro   8/31/2020  4:00 PM Papenfuss, Bradford Boxer, RT [facility] AND [facility]

## 2020-08-24 ENCOUNTER — APPOINTMENT (OUTPATIENT)
Dept: PHYSICAL THERAPY | Age: 65
End: 2020-08-24
Payer: COMMERCIAL

## 2020-08-31 ENCOUNTER — HOSPITAL ENCOUNTER (OUTPATIENT)
Dept: PHYSICAL THERAPY | Age: 65
Discharge: HOME | End: 2020-08-31
Payer: COMMERCIAL

## 2020-08-31 NOTE — PROGRESS NOTES
Lorraine Scott  : 1955      Payor: [facility] / Plan: [address] / Product Type: O /    [address] [facility] at [address] [place] [address]. Adeline Marrufo, [address] [place] [address]  [phone]   [phone]              OUTPATIENT PHYSICAL THERAPY:Re-evaluation 2020  Visit # 9   ICD-10: Treatment Diagnosis:   Pain in left elbow (M25.522)  Stiffness of left elbow, not elsewhere classified (M25.622)  Medial epicondylitis, left elbow (M77.02)                  Precautions/Allergies:   Levaquin [levofloxacin]; Cellcept [mycophenolate mofetil]; and Other medication   Fall Risk Score: 0 (? 5 = High Risk)  MD Orders: Eval and Treat  MEDICAL/REFERRING DIAGNOSIS:  Medial epicondylitis, left elbow [M77.02]   DATE OF ONSET: 2 years  REFERRING PHYSICIAN: Jolly Obrien MD  RETURN PHYSICIAN APPOINTMENT: TBD by patient      INITIAL ASSESSMENT:  Ms. Lorraine Scott has attended 10 physical therapy sessions including initial evaluation. Ms. Joe Gonzalez has made improvements in functional ability, range of motion and strength as noted below. Would recommend continuing physical therapy 1-2 times a week. To address unachieved goals. Lorraine Scott will benefit from skilled PT (medically necessary) to address above deficits affecting participation in basic ADLs and overall functional tolerance. PROBLEM LIST (Impacting functional limitations):  · Decreased Strength  · Decreased ADL/Functional Activities  · Decreased Transfer Abilities  · Increased Pain  · Decreased Activity Tolerance  · Decreased Flexibility/Joint Mobility  · Decreased [place] with Home Exercise Program INTERVENTIONS PLANNED:    1. Family Education  2. Home Exercise Program (HEP)  3. Manual Therapy  4. Neuromuscular Re-education/Strengthening  5. Range of Motion (ROM)  6. Therapeutic Activites  7.  Therapeutic Exercise/Strengthening             TREATMENT PLAN:  Effective Dates: 7/23/2020 TO 9/21/2020 (60 days). Frequency/Duration: 2 times a week for 60 Days  GOALS: (Goals have been discussed and agreed upon with patient.)   Short-Term Goals~4 weeks  Goal Met   1. Lnyn Bee will decrease pain to 3   to allow patient to perform self care tasks. 1.  [] Date:   2. Lynn Bee will increase motion in left elbow  by 10 degrees to improve functional use of upper extremity in ADL activities. 2.  [x] Date:   3. Lynn Bee will Increase strength in  by 10 pounds to allow patient to  and lift objects during self care activities. 3.  [x] Date:   4. Lynn Bee will improve DASH score by 10 points 4. [x] Date:   5      Long Term Goals~8 weeks Goal Met   1. Lynn Bee will Decrease pain to 2 to allow patient to perform all household and work tasks 1. [] Date:   2. Lynn Bee will Increase motion in left elbow to WNL to allow patient to perform all ADL activities. 2.  [x] Date:   3. Lynn Bee will Increase strength in left hand by 15 pounds to allow patient to , lift, hold, and carry heavy objects 3. [] Date:   4. Lynn Bee will  4. [] Date:            Outcome Measure: Tool Used: Disabilities of the Arm, Shoulder and Hand (DASH) Questionnaire - Quick Version  Score:  Initial: 31/55  Most Recent: 20/55 (Date: 8/31/20 )   Interpretation of Score: The DASH is designed to measure the activities of daily living in person's with upper extremity dysfunction or pain. Each section is scored on a 1-5 scale, 5 representing the greatest disability. The scores of each section are added together for a total score of 55. Medical Necessity:   · Skilled intervention required due to deficits and impairments seen upon initial evaluation affecting patient's participation in ADLs and functional tasks.   *  ·   Reason for Services/Other Comments:  · Patient requires skilled intervention due to deficits and impairments seen upon initial evaluation affecting patient's participation in ADLs and functional tasks. Future Appointments   Date Time Provider Manisha Angelina   9/4/2020  7:30 AM Seth Rose, RT [facility]   9/11/2020  7:30 AM Seth Rose, RT [facility]       Total Treatment Duration:      PT Patient Time In/Time Out  Time In: 1600  Time Out: 1700  Suresh Rose, RT  Rehabilitation Potential For Stated Goals: Good  Regarding Adwoa Mcghee's therapy, I certify that the treatment plan above will be carried out by a therapist or under their direction. Thank you for this referral,  Brittanie Rainey, PT CHT         Referring Physician Signature: Hue Altamirano MD _________________________  Date _________               HISTORY:   History of Present Injury/Illness (Reason for Referral):  Gradual onset was moving chairs at school hurt elbow. Teaches elementary school music. Two cortisone shots last one on 3/6/20. Only temporary relief. Has had acupuncture for 5 visits. Feels that she is maybe 50% over the last month. Had therapy for rotator cuff pathology last fall with great results. No neck or shoulder pain. Pain Scale: burning pain, twinges, pain medial epicondyle radiates into forearm, not to fingers. Has trigger finger right middle finger.   Current: 4/10  Best:  2/10  Worst:  8/10    · Aggravating factors: tool use, push/pull, lifting,  Carrying her computer at work  · Relieving factors: rest and ice  · Irritability: Medium (Onset of pain is equal to alleviation)      Past Medical History/Comorbidities:   Ms. Zeny Fournier  has a past medical history of Allergic rhinitis, cause unspecified (7/1/2015), Anxiety, Anxiety, BPPV (benign paroxysmal positional vertigo), Chronic pain, Closed fracture of medial malleolus, Ear problems, Enthesopathy of ankle and tarsus, unspecified, Esophageal reflux (7/1/2015), GERD (gastroesophageal reflux disease), Gestational diabetes (1990), Hallux valgus (acquired), and History of gestational diabetes. Ms. Alfred Alfonso  has a past surgical history that includes hx bunionectomy (Right); hx  section (, ); hx appendectomy (); hx endoscopy (2017); hx colonoscopy (2017); hx abdominal wall defect repair; hx orthopaedic (Left, ); and hx orthopaedic (Right, early ). Social History/Living Environment:     lives alone  Prior Level of Function/Work/Activity:         Date Last Reviewed:  2020   EXAMINATION:   Observation/Orthostatic Postural Assessment:          Rounded shoulders  Palpation:          Pain deep  palpation left medial epicondyle        Active Range of Motion  2020     Fingers     Normal      Thumb     Normal    Wrist and Elbow      Right  Left   Elbow Flexion 150 145   Elbow Extension 0 + 10    Wrist Extension     Wrist Flexion     Ulnar Deviation     Radial Deviation     Supination 90 90   Pronation 90 90   Shoulder flexion 170 155   IR /ER T6/T2 T8/T4                     Passive Wrist and Elbow      Right  Left   Elbow Flexion  145   Elbow Extension  + 10     Strength  Date: 2020       Right  Left 20 Left  20   Elbow Flexion 5 5- 5- pain    Elbow Extension 5 3+ 4+   Wrist Extension 5 3+ 4+   Wrist Flexion  5 3+ Pain 4   Supination 5 4 4+    Pronation 5 4 4+          40  28 pain 6/10 38 lbs  Pain 3/10           Neurological Screen: Assessed @ Initial Visit    Radiating symptoms? Yes complaints of pain radiating from medial epicondyle to wrist flexor muscle bellies. Special Test: Tinel's at elbow: Negative    Functional Mobility: shoulder range of motion        Body Structures Involved:      1. Muscle Body Functions Affected:  1. Sensory/Pain  2. Neuromusculoskeletal  3. Movement Related Activities and Participation Affected:  1. Mobility  2.  Self Care   CLINICAL PRESENTATION:   CLINICAL DECISION MAKING:

## 2020-08-31 NOTE — PROGRESS NOTES
Donovan Blend  : 1955  Payor: [facility] / Plan: [address] / Product Type: [facility] /  [address] [facility] at [address] [place] [address]. [facility], [address], [address] [place] [address]  [phone]   [phone]                                                          Juan Vernon MD      OUTPATIENT PHYSICAL THERAPY: Daily Treatment Note 2020 Visit Count:  9    Tx Diagnosis ICD-10: Treatment Diagnosis:   Pain in left elbow (M25.522)  Stiffness of left elbow, not elsewhere classified (M25.622)  Medial epicondylitis, left elbow (M77.02)         Pre-treatment Symptoms/Complaints:holding arm bent for prolonged of time carring clip board  Pain: Initial:4/10 Post Session: 4/10   Medications Last Reviewed:  2020     Updated Objective Findings: see progress note          TREATMENT:   THERAPEUTIC EXERCISE: (45 minutes):  Exercises per grid below to improve mobility, strength and balance. Required minimal visual, verbal and manual cues to promote proper body alignment and promote proper body posture. Progressed resistance and complexity of movement as indicated.      Date:  2020 Date:  20 Date:  7/30/20 8/3/20 8/6/20 8/10/20 8//14/20 8/17/20 8/21/20 8/31/20   Activity/Exercise Parameters Parameters Parameters          Education HEP, POC, PT goals, anatomy/pathology Pain alarm system, safe pain parameters           Isometric wrist flexion 45 on 5 x 2# dumbell hold            Bilateral ER holds  45 sec on 5 x             UBE   3/3 L3 L 3.6 8 min 3/3 6 min 3/3 3.6  3/3/ L6 3/3/ 6 min 3/3 6 min 3/3 6 min 5.5  6 min L 5   Wall slides  10 x 10 x with lift off at top 2 x 10 2 x 10  2 x 10  2 x 10 2 x 10 2 x 10 2 x 10    scaption  2# 1 x 10  3# 1 x 10 3# 2 x 10  4# 1 x 10  3# 1 x 10 3 x 5 4# 3 x 5 5# 3 x 5 6# 1 x 5]7#, 2 x 5 6# 3 x 5 7# 10 x 2 7#   Farmer carry   10# L, 15# R 280' 15# 280' 15# 280' 15# 426' 20# 280 20# 350' 30 # 5' x 5 L   R 140' 30# 45' x 2 ea arm Rows   13# 2 x 10  20# 2 x 10 20# 2 x 10 23# 5 x 3 27# 30 x seated  30# 10 x 3 33# 3 x 10 Standing single bent over 8# 2 x 10 ea 7# 2 x 10   Overhead press   5 x barbell 10#  10 x dumbells 4# 10 x  2   7.5#  5 x 3 8# 5 x 3 8# 7 x 3 9# 2 x 5 10# 2 x 5 12# 2 x 5 12 #   Bilateral shoulder ER with band   2 x 10 ea blue 2 x 10 ER/IR bilateral 2 x 10 bilat green band        Left flexor stretch on door    Wrist flexors 5 x 5   5 x  5 x 10 x 2 x 10 5# 10 x   Wall pushup    10 x 2 x 5  2 x 5 8 x 36\" height table 7 x 30\"      Active elbow and wrist ext     20 x         Bicep curls     3# 2 x 5 4# 2 x 5 4# 3 x 5 5# 10 x 5# 2 x 10, 12# eccentric with bar 10 x 8# bar eccentrics 2 x 10 stretchinng in between   L stretch       5 x   5x 5 x   Dead lift          5 x 1     Chair squat kettle bell          15# 10 x     Ball slam 4#         2 x 10           THERAPEUTIC ACTIVITY: ( 0 minutes): Activities per gid below to improve functional movement related mobility, strength and balance to improve neuro-muscular carryover to daily functional activities for improving patient's quality of life. Required visual, verbal and manual cues to promote proper body alignment and promote proper body posture/mechanics. Progressed resistance and complexity of movement as indicated. Date:  8/31/2020 Date:   Date:     Activity/Exercise Parameters Parameters Parameters                                                                               MANUAL THERAPY: ( minutes): Joint mobilization, Soft tissue mobilization was utilized and necessary because of the patient's restricted joint motion and restricted motion of soft tissue mobility. Date  8/31/2020    Technique Used Grade  Level # Time(s) Effect while being performed   Soft tissue mobilization IASTM   5 min Left forearm emphasis on wrist flexors                                                          HEP Log Date 1. Isometrics wrist flexion 7/23/20   2. Isometrics bilateral shoulder ER 7/23/20   3.scaption, wallslides, shrug 7/27/20   4. shoulder press, door way stretch 7/30/20   5. [employer] Portal  Treatment/Session Summary:    Response to Treatment:   Pain with eccentric bicep curl   Communication/Consultation:  continue HEP will continue therapy 1 time per week   Equipment provided today: none     Recommendations/Intent for next treatment session:   Next visit will focus on re-evaluation, review of HEP            Treatment Plan of Care Effective Dates: 8/31/2020 TO 9/21/2020 (60 days). Frequency/Duration: 2 times a week for 60 Days             Total Treatment Billable Duration:  45 min  PT Patient Time In/Time Out  Time In: 1600  Time Out: BLAZE Michael [address], RT    No future appointments.

## 2020-09-04 ENCOUNTER — HOSPITAL ENCOUNTER (OUTPATIENT)
Dept: PHYSICAL THERAPY | Age: 65
Discharge: HOME | End: 2020-09-04
Payer: COMMERCIAL

## 2020-09-04 PROCEDURE — 97110 THERAPEUTIC EXERCISES: CPT

## 2020-09-04 NOTE — PROGRESS NOTES
[facility]  : 1955  Payor: [facility] / Plan: [address] / Product Type: PPO /  [address] at [address]. [facility], [address], [address]  [phone]   [phone]                                                          Annella Gilford Melvina Balder, MD      OUTPATIENT PHYSICAL THERAPY: Daily Treatment Note 2020 Visit Count:  10    Tx Diagnosis ICD-10: Treatment Diagnosis:   Pain in left elbow (M25.522)  Stiffness of left elbow, not elsewhere classified (M25.622)  Medial epicondylitis, left elbow (M77.02)         Pre-treatment Symptoms/Complaints:holding arm bent for prolonged of time carring clip board  Pain: Initial:4/10 Post Session: 4/10   Medications Last Reviewed:  2020     Updated Objective Findings: see progress note          TREATMENT:   THERAPEUTIC EXERCISE: (40 minutes):  Exercises per grid below to improve mobility, strength and balance. Required minimal visual, verbal and manual cues to promote proper body alignment and promote proper body posture. Progressed resistance and complexity of movement as indicated.      Date:  2020 Date:  20 Date:  7/30/20 8/3/20 8/6/20 8/10/20 8//14/20 8/17/20 8/21/20 8/31/20 9/4/20   Activity/Exercise Parameters Parameters Parameters           Education HEP, POC, PT goals, anatomy/pathology Pain alarm system, safe pain parameters            Isometric wrist flexion 45 on 5 x 2# dumbell hold             Bilateral ER holds  45 sec on 5 x              UBE   3/3 L3 L 3.6 8 min 3/3 6 min 3/3 3.6  3/3/ L6 3/3/ 6 min 3/3 6 min 3/3 6 min 5.5  6 min L 5 6 min L 5.5   Wall slides  10 x 10 x with lift off at top 2 x 10 2 x 10  2 x 10  2 x 10 2 x 10 2 x 10 2 x 10  2 x 10   scaption  2# 1 x 10  3# 1 x 10 3# 2 x 10  4# 1 x 10  3# 1 x 10 3 x 5 4# 3 x 5 5# 3 x 5 6# 1 x 5]7#, 2 x 5 6# 3 x 5 7# 10 x 2 7# 3 x 5 7#   Farmer carry   10# L, 15# R 280' 15# 280' 15# 280' 15# 426' 20# 280 20# 350' 30 # 5' x 5 L   R 140' 30# 39' x 2 ea arm  30# 39' x 2    Rows   13# 2 x 10  20# 2 x 10 20# 2 x 10 23# 5 x 3 27# 30 x seated  30# 10 x 3 33# 3 x 10 Standing single bent over 8# 2 x 10 ea 7# 2 x 10 7# 2 x 10   Overhead press   5 x barbell 10#  10 x dumbells 4# 10 x  2   7.5#  5 x 3 8# 5 x 3 8# 7 x 3 9# 2 x 5 10# 2 x 5 12# 2 x 5 12 # 3 x 5 12# barbell   Bilateral shoulder ER with band   2 x 10 ea blue 2 x 10 ER/IR bilateral 2 x 10 bilat green band         Left flexor stretch on door    Wrist flexors 5 x 5   5 x  5 x 10 x 2 x 10 5# 10 x 10 x   Wall pushup    10 x 2 x 5  2 x 5 8 x 36\" height table 7 x 30\"    2 x 10 on wall    Active elbow and wrist ext     20 x          Bicep curls     3# 2 x 5 4# 2 x 5 4# 3 x 5 5# 10 x 5# 2 x 10, 12# eccentric with bar 10 x 8# bar eccentrics 2 x 10 stretchinng in between   6# eccentrics 2 x 10    L stretch       5 x   5x 5 x    Dead lift          5 x 1      Chair squat kettle bell          15# 10 x   15# 10 x   Ball slam 4#         2 x 10            THERAPEUTIC ACTIVITY: ( 0 minutes): Activities per gid below to improve functional movement related mobility, strength and balance to improve neuro-muscular carryover to daily functional activities for improving patient's quality of life. Required visual, verbal and manual cues to promote proper body alignment and promote proper body posture/mechanics. Progressed resistance and complexity of movement as indicated. Date:  9/4/2020 Date:   Date:     Activity/Exercise Parameters Parameters Parameters                                                                               MANUAL THERAPY: (5 minutes): Joint mobilization, Soft tissue mobilization was utilized and necessary because of the patient's restricted joint motion and restricted motion of soft tissue mobility.         Date  9/4/2020    Technique Used Grade  Level # Time(s) Effect while being performed   Soft tissue mobilization IASTM,   5 min Left forearm emphasis on wrist flexors                                                          HEP Log Date 1. Isometrics wrist flexion 7/23/20   2. Isometrics bilateral shoulder ER 7/23/20   3.scaption, wallslides, shrug 7/27/20   4. shoulder press, door way stretch 7/30/20   5. [employer] Portal  Treatment/Session Summary:    Response to Treatment:   Pain with eccentric bicep curl   Communication/Consultation:  continue HEP will continue therapy 1 time per week. Eccentric bicep at home every other day   Equipment provided today: none     Recommendations/Intent for next treatment session:   Next visit will focus on re-evaluation, review of HEP            Treatment Plan of Care Effective Dates: 9/4/2020 TO 9/21/2020 (60 days).   Frequency/Duration: 2 times a week for 60 Days             Total Treatment Billable Duration:  45 min  PT Patient Time In/Time Out  Time In: 0730  Time Out: [address], RT    Future Appointments   Date Time Provider Manisha Alfaro   9/11/2020  7:30 AM Odessa Rose, RT [facility]

## 2020-09-11 ENCOUNTER — HOSPITAL ENCOUNTER (OUTPATIENT)
Dept: PHYSICAL THERAPY | Age: 65
Discharge: HOME | End: 2020-09-11
Payer: COMMERCIAL

## 2020-09-11 PROCEDURE — 97110 THERAPEUTIC EXERCISES: CPT

## 2020-09-11 NOTE — PROGRESS NOTES
Samantha Hatch  : 1955  Payor: [facility] / Plan: [address] / Product Type: PPO /  Manus Paling at [address]. [facility], [address] [place] [address], [facility], [address] [place] [address]  [phone]   [phone]                                                          Shelia Grewal MD      OUTPATIENT PHYSICAL THERAPY: Daily Treatment Note 2020 Visit Count:  11    Tx Diagnosis ICD-10: Treatment Diagnosis:   Pain in left elbow (M25.522)  Stiffness of left elbow, not elsewhere classified (M25.622)  Medial epicondylitis, left elbow (M77.02)         Pre-treatment Symptoms/Complaints:holding arm bent for prolonged of time carring clip board  Pain: Initial:4/10 Post Session: 4/10   Medications Last Reviewed:  2020     Updated Objective Findings: see progress note          TREATMENT:   THERAPEUTIC EXERCISE: (45 minutes):  Exercises per grid below to improve mobility, strength and balance. Required minimal visual, verbal and manual cues to promote proper body alignment and promote proper body posture. Progressed resistance and complexity of movement as indicated.      Date:  2020 Date:  20 Date:  7/30/20 8/3/20 8/6/20 8/10/20 8//14/20 8/17/20 8/21/20 8/31/20 9/4/20 9/11/20   Activity/Exercise Parameters Parameters Parameters            Education HEP, POC, PT goals, anatomy/pathology Pain alarm system, safe pain parameters             Isometric wrist flexion 45 on 5 x 2# dumbell hold              Bilateral ER holds  45 sec on 5 x               UBE   3/3 L3 L 3.6 8 min 3/3 6 min 3/3 3.6  3/3/ L6 3/3/ 6 min 3/3 6 min 3/3 6 min 5.5  6 min L 5 6 min L 5.5 6 min 5.5   Wall slides  10 x 10 x with lift off at top 2 x 10 2 x 10  2 x 10  2 x 10 2 x 10 2 x 10 2 x 10  2 x 10 2 x 10   scaption  2# 1 x 10  3# 1 x 10 3# 2 x 10  4# 1 x 10  3# 1 x 10 3 x 5 4# 3 x 5 5# 3 x 5 6# 1 x 5]7#, 2 x 5 6# 3 x 5 7# 10 x 2 7# 3 x 5 7# 2 x 10 7#   Farmer carry   10# L, 15# R 280' 15# 280' 15# 280' 15# 426' 20# 26 20# 350' 26 # 5' x 5 L   R 140' 30# 39' x 2 ea arm  30# 45' x 2  30# 90' x 2 ea   Rows   13# 2 x 10  20# 2 x 10 20# 2 x 10 23# 5 x 3 27# 30 x seated  30# 10 x 3 33# 3 x 10 Standing single bent over 8# 2 x 10 ea 7# 2 x 10 7# 2 x 10 8# 2x 52 x 10   Overhead press   5 x barbell 10#  10 x dumbells 4# 10 x  2   7.5#  5 x 3 8# 5 x 3 8# 7 x 3 9# 2 x 5 10# 2 x 5 12# 2 x 5 12 # 3 x 5 12# barbell 14# 3 x 5 barbell    Bilateral shoulder ER with band   2 x 10 ea blue 2 x 10 ER/IR bilateral 2 x 10 bilat green band          Left flexor stretch on door    Wrist flexors 5 x 5   5 x  5 x 10 x 2 x 10 5# 10 x 10 x 10 x   Wall pushup    10 x 2 x 5  2 x 5 8 x 36\" height table 7 x 30\"    2 x 10 on wall  2 x 10 table    Active elbow and wrist ext     20 x           Bicep curls     3# 2 x 5 4# 2 x 5 4# 3 x 5 5# 10 x 5# 2 x 10, 12# eccentric with bar 10 x 8# bar eccentrics 2 x 10 stretchinng in between   6# eccentrics 2 x 10  7# 2 x 10    L stretch       5 x   5x 5 x     Dead lift          5 x 1       Chair squat kettle bell          15# 10 x   15# 10 x 15# 2 x 10   Ball slam 4#         2 x 10             THERAPEUTIC ACTIVITY: ( 0 minutes): Activities per gid below to improve functional movement related mobility, strength and balance to improve neuro-muscular carryover to daily functional activities for improving patient's quality of life. Required visual, verbal and manual cues to promote proper body alignment and promote proper body posture/mechanics. Progressed resistance and complexity of movement as indicated. Date:  9/11/2020 Date:   Date:     Activity/Exercise Parameters Parameters Parameters                                                                               MANUAL THERAPY: 0 minutes): Joint mobilization, Soft tissue mobilization was utilized and necessary because of the patient's restricted joint motion and restricted motion of soft tissue mobility. Date  9/11/2020    Technique Used Grade  Level # Time(s) Effect while being performed   Soft tissue mobilization IASTM,   5 min Left forearm emphasis on wrist flexors                                                          HEP Log Date 1. Isometrics wrist flexion 7/23/20   2. Isometrics bilateral shoulder ER 7/23/20   3.scaption, wallslides, shrug 7/27/20   4. shoulder press, door way stretch 7/30/20   5. [employer] Portal  Treatment/Session Summary:    Response to Treatment:   Pain with eccentric bicep curl but able to lift more weight   Communication/Consultation:  continue HEP will continue therapy 1 time per week. Eccentric bicep at home every other day   Equipment provided today: none     Recommendations/Intent for next treatment session:   Next visit will focus on re-evaluation, review of HEP            Treatment Plan of Care Effective Dates: 9/11/2020 TO 9/21/2020 (60 days).   Frequency/Duration: 2 times a week for 60 Days             Total Treatment Billable Duration:  45 min  PT Patient Time In/Time Out  Time In: 4674  Time Out: [address] [place]    Future Appointments   Date Time Provider Manisha Alfaro   9/18/2020  7:30 AM Chris Rose RT STEPHEN ASHLEY   9/23/2020  3:00 PM Chris Rose RT [facility]

## 2020-09-18 ENCOUNTER — HOSPITAL ENCOUNTER (OUTPATIENT)
Dept: PHYSICAL THERAPY | Age: 65
Discharge: HOME | End: 2020-09-18
Payer: COMMERCIAL

## 2020-09-18 PROCEDURE — 97110 THERAPEUTIC EXERCISES: CPT

## 2020-09-18 NOTE — PROGRESS NOTES
Humaira Mendoza  : 1955  Payor: [facility] / Plan: [address] / Product Type: PPO /  Hannah Brighton at [address] [facility]. [facility]., [address] [facility], [address] [place] [address]  [phone]   [phone]                                                          Evelina Fitzgerald MD      OUTPATIENT PHYSICAL THERAPY: Daily Treatment Note 2020 Visit Count:  12    Tx Diagnosis ICD-10: Treatment Diagnosis:   Pain in left elbow (M25.522)  Stiffness of left elbow, not elsewhere classified (M25.622)  Medial epicondylitis, left elbow (M77.02)         Pre-treatment Symptoms/Complaints:holding arm bent for prolonged of time carring clip board, but feeling stronger. Thinking about going to [employer] with a friend that is a . Pain: Initial 1/10 Post Session: 3/10   Medications Last Reviewed:  2020     Updated Objective Findings: see progress note          TREATMENT:   THERAPEUTIC EXERCISE: (40 minutes):  Exercises per grid below to improve mobility, strength and balance. Required minimal visual, verbal and manual cues to promote proper body alignment and promote proper body posture. Progressed resistance and complexity of movement as indicated.      Date:  2020 Date:  20 Date:  7/30/20 8/3/20 8/6/20 8/10/20 8//14/20 8/17/20 8/21/20 8/31/20 9/4/20 9/18/20   Activity/Exercise Parameters Parameters Parameters            Education HEP, POC, PT goals, anatomy/pathology Pain alarm system, safe pain parameters             Isometric wrist flexion 45 on 5 x 2# dumbell hold              Bilateral ER holds  45 sec on 5 x               UBE   3/3 L3 L 3.6 8 min 3/3 6 min 3/3 3.6  3/3/ L6 3/3/ 6 min 3/3 6 min 3/3 6 min 5.5  6 min L 5 6 min L 5.5 6 min  L 5.8   Wall slides  10 x 10 x with lift off at top 2 x 10 2 x 10  2 x 10  2 x 10 2 x 10 2 x 10 2 x 10  2 x 10 2 x 10   scaption  2# 1 x 10  3# 1 x 10 3# 2 x 10  4# 1 x 10  3# 1 x 10 3 x 5 4# 3 x 5 5# 3 x 5 6# 1 x 5]7#, 2 x 5 6# 3 x 5 7# 10 x 2 7# 3 x 5 7# 3 x 5 8#   Farmer carry   10# L, 15# R 280' 15# 280' 15# 280' 15# 426' 20# 280 20# 350' 26 # 5' x 5 L   R 140' 30# 39' x 2 ea arm  30# 45' x 2  100' x 2 30#    Rows   13# 2 x 10  20# 2 x 10 20# 2 x 10 23# 5 x 3 27# 30 x seated  30# 10 x 3 33# 3 x 10 Standing single bent over 8# 2 x 10 ea 7# 2 x 10 7# 2 x 10 8# 2 x 10   Overhead press   5 x barbell 10#  10 x dumbells 4# 10 x  2   7.5#  5 x 3 8# 5 x 3 8# 7 x 3 9# 2 x 5 10# 2 x 5 12# 2 x 5 12 # 3 x 5 12# barbell 3 x 5 15# barbell5    Bilateral shoulder ER with band   2 x 10 ea blue 2 x 10 ER/IR bilateral 2 x 10 bilat green band          Left flexor stretch on door    Wrist flexors 5 x 5   5 x  5 x 10 x 2 x 10 5# 10 x 10 x    Wall pushup    10 x 2 x 5  2 x 5 8 x 36\" height table 7 x 30\"    2 x 10 on wall  5 x    Active elbow and wrist ext     20 x           Bicep curls     3# 2 x 5 4# 2 x 5 4# 3 x 5 5# 10 x 5# 2 x 10, 12# eccentric with bar 10 x 8# bar eccentrics 2 x 10 stretchinng in between   6# eccentrics 2 x 10  7# 2 x 10    L stretch       5 x   5x 5 x  5 x   Dead lift          5 x 1       Chair squat kettle bell          15# 10 x   15# 10 x    Ball slam 4#         2 x 10             THERAPEUTIC ACTIVITY: ( 0 minutes): Activities per gid below to improve functional movement related mobility, strength and balance to improve neuro-muscular carryover to daily functional activities for improving patient's quality of life. Required visual, verbal and manual cues to promote proper body alignment and promote proper body posture/mechanics. Progressed resistance and complexity of movement as indicated.      Date:  9/18/2020 Date:   Date:     Activity/Exercise Parameters Parameters Parameters                                                                               MANUAL THERAPY: (5 minutes): Joint mobilization, Soft tissue mobilization was utilized and necessary because of the patient's restricted joint motion and restricted motion of soft tissue mobility. Date  9/18/2020    Technique Used Grade  Level # Time(s) Effect while being performed   Soft tissue mobilization IASTM,   5 min Left forearm emphasis on wrist flexors                                                          HEP Log Date 1. Isometrics wrist flexion 7/23/20   2. Isometrics bilateral shoulder ER 7/23/20   3.scaption, wallslides, shrug 7/27/20   4. shoulder press, door way stretch 7/30/20   5. [employer] Portal  Treatment/Session Summary:    Response to Treatment:   Pain with eccentric bicep curl   Communication/Consultation:  continue HEP will continue therapy 1 time per week. Eccentric bicep at home every other day   Equipment provided today: none     Recommendations/Intent for next treatment session:   Next visit will focus on re-evaluation, review of HEP            Treatment Plan of Care Effective Dates: 9/18/2020 TO 9/21/2020 (60 days).   Frequency/Duration: 2 times a week for 60 Days             Total Treatment Billable Duration:  45 min  PT Patient Time In/Time Out  Time In: 0730  Time Out: 0830  Thea Molina, RT    Future Appointments   Date Time Provider Manisha Alfaro   9/23/2020  3:00 PM Falguni Rose, RT [facility]

## 2020-09-22 ENCOUNTER — HOSPITAL ENCOUNTER (OUTPATIENT)
Dept: PHYSICAL THERAPY | Age: 65
Discharge: HOME | End: 2020-09-22
Payer: COMMERCIAL

## 2020-09-22 PROCEDURE — 97110 THERAPEUTIC EXERCISES: CPT

## 2020-09-22 NOTE — PROGRESS NOTES
Anatoly Reading  : 1955  Payor: [facility] / Plan: [id number] / Product Type: PPO /  Dany Cortes at [address]. [facility], [address] [place] [address]  [phone]   [phone]                                                          Antonia Dear Talia Gates MD      OUTPATIENT PHYSICAL THERAPY: Daily Treatment Note 2020 Visit Count:  13    Tx Diagnosis ICD-10: Treatment Diagnosis:   Pain in left elbow (M25.522)  Stiffness of left elbow, not elsewhere classified (M25.622)  Medial epicondylitis, left elbow (M77.02)         Pre-treatment Symptoms/Complaints:DASH 14 see DC note  Pain: Initial 1/10 Post Session: 2/10   Medications Last Reviewed:  2020     Updated Objective Findings: see progress note          TREATMENT:   THERAPEUTIC EXERCISE: (40 minutes):  Exercises per grid below to improve mobility, strength and balance. Required minimal visual, verbal and manual cues to promote proper body alignment and promote proper body posture. Progressed resistance and complexity of movement as indicated.      Date:  2020 Date:  20 Date:  7/30/20 8/3/20 8/6/20 8/10/20 8//14/20 8/17/20 8/21/20 8/31/20 9/4/20 9/18/20 9/22/20   Activity/Exercise Parameters Parameters Parameters             Education HEP, POC, PT goals, anatomy/pathology Pain alarm system, safe pain parameters           DC plan HEP gym plan   Isometric wrist flexion 45 on 5 x 2# dumbell hold               Bilateral ER holds  45 sec on 5 x                UBE   3/3 L3 L 3.6 8 min 3/3 6 min 3/3 3.6  3/3/ L6 3/3/ 6 min 3/3 6 min 3/3 6 min 5.5  6 min L 5 6 min L 5.5 6 min  L 5.8 6 min L 6 hills   Wall slides  10 x 10 x with lift off at top 2 x 10 2 x 10  2 x 10  2 x 10 2 x 10 2 x 10 2 x 10  2 x 10 2 x 10 2 x 10   scaption  2# 1 x 10  3# 1 x 10 3# 2 x 10  4# 1 x 10  3# 1 x 10 3 x 5 4# 3 x 5 5# 3 x 5 6# 1 x 5]7#, 2 x 5 6# 3 x 5 7# 10 x 2 7# 3 x 5 7# 3 x 5 8# 3 x 5 8 lbs   Junnie Sites carry   10# L, 15# R 280' 15# 280' 15# 26' 15# 36' 20# 26 20# 350' 26 # 5' x 5 L   R 140' 30# 39' x 2 ea arm  30# 45' x 2  100' x 2 30#  100' x 2 30#   Rows   13# 2 x 10  20# 2 x 10 20# 2 x 10 23# 5 x 3 27# 30 x seated  30# 10 x 3 33# 3 x 10 Standing single bent over 8# 2 x 10 ea 7# 2 x 10 7# 2 x 10 8# 2 x 10 8 # 2 x 10    Overhead press   5 x barbell 10#  10 x dumbells 4# 10 x  2   7.5#  5 x 3 8# 5 x 3 8# 7 x 3 9# 2 x 5 10# 2 x 5 12# 2 x 5 12 # 3 x 5 12# barbell 3 x 5 15# barbell5  3 x 5 15#   Bilateral shoulder ER with band   2 x 10 ea blue 2 x 10 ER/IR bilateral 2 x 10 bilat green band           Left flexor stretch on door    Wrist flexors 5 x 5   5 x  5 x 10 x 2 x 10 5# 10 x 10 x     Wall pushup    10 x 2 x 5  2 x 5 8 x 36\" height table 7 x 30\"    2 x 10 on wall  5 x  10 x   Active elbow and wrist ext     20 x            Bicep curls     3# 2 x 5 4# 2 x 5 4# 3 x 5 5# 10 x 5# 2 x 10, 12# eccentric with bar 10 x 8# bar eccentrics 2 x 10 stretchinng in between   6# eccentrics 2 x 10  7# 2 x 10  7# 2 x 10   L stretch       5 x   5x 5 x  5 x 5 x   Dead lift          5 x 1        Chair squat kettle bell          15# 10 x   15# 10 x     Ball slam 4#         2 x 10              THERAPEUTIC ACTIVITY: ( 0 minutes): Activities per gid below to improve functional movement related mobility, strength and balance to improve neuro-muscular carryover to daily functional activities for improving patient's quality of life. Required visual, verbal and manual cues to promote proper body alignment and promote proper body posture/mechanics. Progressed resistance and complexity of movement as indicated.      Date:  9/22/2020 Date:   Date:     Activity/Exercise Parameters Parameters Parameters                                                                               MANUAL THERAPY: (5 minutes): Joint mobilization, Soft tissue mobilization was utilized and necessary because of the patient's restricted joint motion and restricted motion of soft tissue mobility. Date  9/22/2020    Technique Used Grade  Level # Time(s) Effect while being performed   Soft tissue mobilization IASTM,   5 min Left forearm emphasis on wrist flexors                                                          HEP Log Date 1. Isometrics wrist flexion 7/23/20   2. Isometrics bilateral shoulder ER 7/23/20   3.scaption, wallslides, shrug 7/27/20   4. shoulder press, door way stretch 7/30/20   5. Seaters Portal  Treatment/Session Summary:    Response to Treatment:   Patient understands her HEP    Communication/Consultation:  continue HEP   Equipment provided today: none     Recommendations: DC to HEP           Treatment Plan of Care Effective Dates: 9/22/2020 TO 9/21/2020 (60 days). Frequency/Duration: 2 times a week for 60 Days             Total Treatment Billable Duration:  45 min  PT Patient Time In/Time Out  Time In: 1415  Time Out: 1500  Ayanna Rose, RT    No future appointments.

## 2020-09-23 NOTE — THERAPY DISCHARGE
Jackie Mcmillan  : 1955      Payor: [facility] / Plan: [id number] / Product Type: PPO /    Randal [place] at [address]. Adeline Marrufo, [address] [place] [address], [address]  [phone]   [phone]              OUTPATIENT PHYSICAL THERAPY:Discharge 2020  Visit # 13   ICD-10: Treatment Diagnosis:   Pain in left elbow (M25.522)  Stiffness of left elbow, not elsewhere classified (M25.622)  Medial epicondylitis, left elbow (M77.02)                  Precautions/Allergies:   Levaquin [levofloxacin]; Cellcept [mycophenolate mofetil]; and Other medication   Fall Risk Score: 0 (? 5 = High Risk)  MD Orders: Eval and Treat  MEDICAL/REFERRING DIAGNOSIS:  Medial epicondylitis, left elbow [M77.02]   DATE OF ONSET: 2 years  REFERRING PHYSICIAN: Salma Hooper MD  RETURN PHYSICIAN APPOINTMENT: TBD by patient      FINAL ASSESSMENT:  Ms. Jackie Mcmillan has attended 13 physical therapy session including initial evaluation. She has been discharged having achieved majority of her goals. Ms Gerda Hampton will continue with an independent home ex program including strength training at a local gym. TREATMENT PLAN:  Effective Dates: 2020 TO 2020 (60 days). Frequency/Duration: 2 times a week for 60 Days  GOALS: (Goals have been discussed and agreed upon with patient.)   Short-Term Goals~4 weeks  Goal Met   1. Jackie Mcmillan will decrease pain to 3   to allow patient to perform self care tasks. 1.  [x] Date: 20   2. Jackie Mcmillan will increase motion in left elbow  by 10 degrees to improve functional use of upper extremity in ADL activities. 2.  [x] Date:   3. Jackie Mcmillan will Increase strength in  by 10 pounds to allow patient to  and lift objects during self care activities. 3.  [x] Date:   4. Jackie Mcmillan will improve DASH score by 10 points 4. [x] Date:   5      Long Term Goals~8 weeks Goal Met   1. Rafael Montrose will Decrease pain to 2 to allow patient to perform all household and work tasks 1. [x] Date:   2. Rafael Montrose will Increase motion in left elbow to WNL to allow patient to perform all ADL activities. 2.  [x] Date:   3. Rafael Montrose will Increase strength in left hand by 15 pounds to allow patient to , lift, hold, and carry heavy objects 3. [] Date:            Outcome Measure: Tool Used: Disabilities of the Arm, Shoulder and Hand (DASH) Questionnaire - Quick Version  Score:  Initial: 31/55  Most Recent: 14/55 (Date: 9/22/20 )   Interpretation of Score: The DASH is designed to measure the activities of daily living in person's with upper extremity dysfunction or pain. Each section is scored on a 1-5 scale, 5 representing the greatest disability. The scores of each section are added together for a total score of 55. Medical Necessity:   · Skilled intervention required due to deficits and impairments seen upon initial evaluation affecting patient's participation in ADLs and functional tasks. *  ·   Reason for Services/Other Comments:  · Patient requires skilled intervention due to deficits and impairments seen upon initial evaluation affecting patient's participation in ADLs and functional tasks. No future appointments. Thank you for this referral,  Arnoldo Rose, PT CHT                      HISTORY:   History of Present Injury/Illness (Reason for Referral):  Gradual onset was moving chairs at school hurt elbow. Teaches elementary school music. Two cortisone shots last one on 3/6/20. Only temporary relief. Has had acupuncture for 5 visits. Feels that she is maybe 50% over the last month. Had therapy for rotator cuff pathology last fall with great results. No neck or shoulder pain.    Pain Scale: as of 9/22/20  Current:2/10  Best:  2/10  Worst: 5/10    · Aggravating factors: prolonged positioning of left elbow  · Relieving factors: stretching, change postion  · Irritability: Low (Onset of pain is is longer than the time it takes for Pain to go away)      Prior Level of Function/Work/Activity:         Current Medications:       Date Last Reviewed:  9/23/2020   EXAMINATION:   Observation/Orthostatic Postural Assessment:          Rounded shoulders  Palpation:          Pain light palpation left medial epicondyle        Active Range of Motion  9/23/2020     Fingers     Normal      Thumb     Normal    Wrist and Elbow 9/23/20   Full range of motion left elbow and wrist        Passive Wrist and Elbow  9/23/20      Right  Left   Elbow Flexion  145   Elbow Extension  0     Strength  Date: 9/23/2020 7/23/20 9/22/20     Right  Left right left   Elbow Flexion 5 5- 5 5   Elbow Extension 5 3+ 5 5   Wrist Extension 5 3+ 5 5   Wrist Flexion  5 3+ Pain 5 5-   Supination 5 4 5 5   Pronation 5 4 5 5           40  28 pain 6/10 50 45 no pain              Special Test: Tinel's at elbow: Negative    Functional Mobility:  WNL                remember to save as eval

## 2020-12-03 RX ORDER — SODIUM CHLORIDE 0.9 % (FLUSH) 0.9 %
5-40 SYRINGE (ML) INJECTION EVERY 8 HOURS
Status: CANCELLED | OUTPATIENT
Start: 2020-12-03

## 2020-12-03 RX ORDER — SODIUM CHLORIDE 0.9 % (FLUSH) 0.9 %
5-40 SYRINGE (ML) INJECTION AS NEEDED
Status: CANCELLED | OUTPATIENT
Start: 2020-12-03

## 2020-12-16 ENCOUNTER — ANESTHESIA EVENT (OUTPATIENT)
Dept: SURGERY | Age: 65
End: 2020-12-16
Payer: COMMERCIAL

## 2020-12-16 RX ORDER — HYDROMORPHONE HYDROCHLORIDE 2 MG/ML
0.5 INJECTION, SOLUTION INTRAMUSCULAR; INTRAVENOUS; SUBCUTANEOUS
Status: CANCELLED | OUTPATIENT
Start: 2020-12-16

## 2020-12-16 RX ORDER — ONDANSETRON 2 MG/ML
4 INJECTION INTRAMUSCULAR; INTRAVENOUS
Status: CANCELLED | OUTPATIENT
Start: 2020-12-16 | End: 2020-12-17

## 2020-12-16 RX ORDER — OXYCODONE HYDROCHLORIDE 5 MG/1
5 TABLET ORAL
Status: CANCELLED | OUTPATIENT
Start: 2020-12-16 | End: 2020-12-17

## 2020-12-16 RX ORDER — ALBUTEROL SULFATE 0.83 MG/ML
2.5 SOLUTION RESPIRATORY (INHALATION) AS NEEDED
Status: CANCELLED | OUTPATIENT
Start: 2020-12-16

## 2020-12-17 ENCOUNTER — ANESTHESIA (OUTPATIENT)
Dept: SURGERY | Age: 65
End: 2020-12-17
Payer: COMMERCIAL

## 2020-12-17 ENCOUNTER — HOSPITAL ENCOUNTER (OUTPATIENT)
Age: 65
Setting detail: OUTPATIENT SURGERY
Discharge: HOME | End: 2020-12-17
Attending: ORTHOPAEDIC SURGERY | Admitting: ORTHOPAEDIC SURGERY
Payer: COMMERCIAL

## 2020-12-17 VITALS
RESPIRATION RATE: 16 BRPM | SYSTOLIC BLOOD PRESSURE: 138 MMHG | WEIGHT: 158 LBS | OXYGEN SATURATION: 97 % | BODY MASS INDEX: 27.12 KG/M2 | DIASTOLIC BLOOD PRESSURE: 94 MMHG | HEART RATE: 57 BPM | TEMPERATURE: 97.6 F

## 2020-12-17 LAB — POTASSIUM BLD-SCNC: 6.8 MMOL/L (ref 3.5–5.1)

## 2020-12-17 PROCEDURE — 76010000154 HC OR TIME FIRST 0.5 HR: Performed by: ORTHOPAEDIC SURGERY

## 2020-12-17 PROCEDURE — 74011250636 HC RX REV CODE- 250/636: Performed by: NURSE ANESTHETIST, CERTIFIED REGISTERED

## 2020-12-17 PROCEDURE — 74011250636 HC RX REV CODE- 250/636: Performed by: ORTHOPAEDIC SURGERY

## 2020-12-17 PROCEDURE — 77030002986 HC SUT PROL J&J -A: Performed by: ORTHOPAEDIC SURGERY

## 2020-12-17 PROCEDURE — 76210000026 HC REC RM PH II 1 TO 1.5 HR: Performed by: ORTHOPAEDIC SURGERY

## 2020-12-17 PROCEDURE — 77030003028 HC SUT VCRL J&J -A: Performed by: ORTHOPAEDIC SURGERY

## 2020-12-17 PROCEDURE — 74011250636 HC RX REV CODE- 250/636

## 2020-12-17 PROCEDURE — 76060000031 HC ANESTHESIA FIRST 0.5 HR: Performed by: ORTHOPAEDIC SURGERY

## 2020-12-17 PROCEDURE — 77030000032 HC CUF TRNQT ZIMM -B: Performed by: ORTHOPAEDIC SURGERY

## 2020-12-17 PROCEDURE — 76210000063 HC OR PH I REC FIRST 0.5 HR: Performed by: ORTHOPAEDIC SURGERY

## 2020-12-17 PROCEDURE — 84132 ASSAY OF SERUM POTASSIUM: CPT

## 2020-12-17 PROCEDURE — 74011250637 HC RX REV CODE- 250/637: Performed by: ANESTHESIOLOGY

## 2020-12-17 PROCEDURE — 74011250636 HC RX REV CODE- 250/636: Performed by: ANESTHESIOLOGY

## 2020-12-17 PROCEDURE — 77030002933 HC SUT MCRYL J&J -A: Performed by: ORTHOPAEDIC SURGERY

## 2020-12-17 PROCEDURE — 77030010507 HC ADH SKN DERMBND J&J -B: Performed by: ORTHOPAEDIC SURGERY

## 2020-12-17 PROCEDURE — 2709999900 HC NON-CHARGEABLE SUPPLY: Performed by: ORTHOPAEDIC SURGERY

## 2020-12-17 PROCEDURE — 74011000250 HC RX REV CODE- 250: Performed by: ORTHOPAEDIC SURGERY

## 2020-12-17 PROCEDURE — 77030002916 HC SUT ETHLN J&J -A: Performed by: ORTHOPAEDIC SURGERY

## 2020-12-17 RX ORDER — LIDOCAINE HYDROCHLORIDE 10 MG/ML
0.1 INJECTION INFILTRATION; PERINEURAL AS NEEDED
Status: DISCONTINUED | OUTPATIENT
Start: 2020-12-17 | End: 2020-12-17 | Stop reason: HOSPADM

## 2020-12-17 RX ORDER — ACETAMINOPHEN 500 MG
1000 TABLET ORAL ONCE
Status: COMPLETED | OUTPATIENT
Start: 2020-12-17 | End: 2020-12-17

## 2020-12-17 RX ORDER — BUPIVACAINE HYDROCHLORIDE 2.5 MG/ML
INJECTION, SOLUTION EPIDURAL; INFILTRATION; INTRACAUDAL AS NEEDED
Status: DISCONTINUED | OUTPATIENT
Start: 2020-12-17 | End: 2020-12-17 | Stop reason: HOSPADM

## 2020-12-17 RX ORDER — SODIUM CHLORIDE 0.9 % (FLUSH) 0.9 %
5-40 SYRINGE (ML) INJECTION AS NEEDED
Status: DISCONTINUED | OUTPATIENT
Start: 2020-12-17 | End: 2020-12-17 | Stop reason: HOSPADM

## 2020-12-17 RX ORDER — MIDAZOLAM HYDROCHLORIDE 1 MG/ML
2 INJECTION, SOLUTION INTRAMUSCULAR; INTRAVENOUS
Status: DISCONTINUED | OUTPATIENT
Start: 2020-12-17 | End: 2020-12-17 | Stop reason: HOSPADM

## 2020-12-17 RX ORDER — LIDOCAINE HYDROCHLORIDE 10 MG/ML
INJECTION INFILTRATION; PERINEURAL AS NEEDED
Status: DISCONTINUED | OUTPATIENT
Start: 2020-12-17 | End: 2020-12-17 | Stop reason: HOSPADM

## 2020-12-17 RX ORDER — PROPOFOL 10 MG/ML
INJECTION, EMULSION INTRAVENOUS
Status: DISCONTINUED | OUTPATIENT
Start: 2020-12-17 | End: 2020-12-17 | Stop reason: HOSPADM

## 2020-12-17 RX ORDER — SODIUM CHLORIDE 0.9 % (FLUSH) 0.9 %
5-40 SYRINGE (ML) INJECTION EVERY 8 HOURS
Status: DISCONTINUED | OUTPATIENT
Start: 2020-12-17 | End: 2020-12-17 | Stop reason: HOSPADM

## 2020-12-17 RX ORDER — CEFAZOLIN SODIUM/WATER 2 G/20 ML
2 SYRINGE (ML) INTRAVENOUS ONCE
Status: COMPLETED | OUTPATIENT
Start: 2020-12-17 | End: 2020-12-17

## 2020-12-17 RX ORDER — PROPOFOL 10 MG/ML
INJECTION, EMULSION INTRAVENOUS AS NEEDED
Status: DISCONTINUED | OUTPATIENT
Start: 2020-12-17 | End: 2020-12-17 | Stop reason: HOSPADM

## 2020-12-17 RX ORDER — SODIUM CHLORIDE, SODIUM LACTATE, POTASSIUM CHLORIDE, CALCIUM CHLORIDE 600; 310; 30; 20 MG/100ML; MG/100ML; MG/100ML; MG/100ML
1000 INJECTION, SOLUTION INTRAVENOUS CONTINUOUS
Status: DISCONTINUED | OUTPATIENT
Start: 2020-12-17 | End: 2020-12-17 | Stop reason: HOSPADM

## 2020-12-17 RX ORDER — METHYLPREDNISOLONE ACETATE 40 MG/ML
INJECTION, SUSPENSION INTRA-ARTICULAR; INTRALESIONAL; INTRAMUSCULAR; SOFT TISSUE AS NEEDED
Status: DISCONTINUED | OUTPATIENT
Start: 2020-12-17 | End: 2020-12-17 | Stop reason: HOSPADM

## 2020-12-17 RX ADMIN — PROPOFOL 30 MG: 10 INJECTION, EMULSION INTRAVENOUS at 10:33

## 2020-12-17 RX ADMIN — PROPOFOL 20 MG: 10 INJECTION, EMULSION INTRAVENOUS at 10:34

## 2020-12-17 RX ADMIN — PROPOFOL 180 MCG/KG/MIN: 10 INJECTION, EMULSION INTRAVENOUS at 10:33

## 2020-12-17 RX ADMIN — ACETAMINOPHEN 1000 MG: 500 TABLET, FILM COATED ORAL at 10:25

## 2020-12-17 RX ADMIN — SODIUM CHLORIDE, SODIUM LACTATE, POTASSIUM CHLORIDE, AND CALCIUM CHLORIDE 1000 ML: 600; 310; 30; 20 INJECTION, SOLUTION INTRAVENOUS at 10:25

## 2020-12-17 RX ADMIN — Medication 2 G: at 10:34

## 2020-12-17 NOTE — ANESTHESIA POSTPROCEDURE EVALUATION
Procedure(s):  LEFT MIDDLE FINGER TRIGGER RELEASE. No value filed. Anesthesia Post Evaluation      Multimodal analgesia: multimodal analgesia used between 6 hours prior to anesthesia start to PACU discharge  Patient location during evaluation: bedside  Patient participation: complete - patient participated  Level of consciousness: awake and responsive to light touch  Pain management: adequate  Airway patency: patent  Anesthetic complications: no  Cardiovascular status: acceptable, hemodynamically stable, blood pressure returned to baseline and stable  Respiratory status: acceptable, unassisted, spontaneous ventilation and nonlabored ventilation  Hydration status: acceptable  Post anesthesia nausea and vomiting:  controlled      INITIAL Post-op Vital signs:   Vitals Value Taken Time   /56 12/17/20 1105   Temp 36.4 °C (97.6 °F) 12/17/20 1056   Pulse 63 12/17/20 1106   Resp 12 12/17/20 1105   SpO2 95 % 12/17/20 1106   Vitals shown include unvalidated device data.

## 2020-12-17 NOTE — H&P
H&P Update:  Cici Snow was seen and examined. History and physical has been reviewed. The patient has been examined. There have been significant clinical changes since the completion of the originally dated History and Physical. Patient would like to have a left medial epicondyle steroid injection for golfer's elbow which she has had for several months. Dr Herbie Elizabeth has given her injections before with good relief. I examined the patient and she has pain at the medial epicondyle which is aggravated by resisted pronation.     Tara Vela MD, PhD  Orthopaedic Surgery  12/17/20  9:26 AM

## 2020-12-17 NOTE — ANESTHESIA PREPROCEDURE EVALUATION
Relevant Problems   GASTROINTESTINAL   (+) Esophageal reflux       Anesthetic History   No history of anesthetic complications            Review of Systems / Medical History  Patient summary reviewed and pertinent labs reviewed    Pulmonary  Within defined limits                 Neuro/Psych         Psychiatric history     Cardiovascular                  Exercise tolerance: >4 METS     GI/Hepatic/Renal     GERD: well controlled           Endo/Other             Other Findings              Physical Exam    Airway  Mallampati: II  TM Distance: 4 - 6 cm  Neck ROM: normal range of motion   Mouth opening: Normal     Cardiovascular    Rhythm: regular  Rate: normal         Dental         Pulmonary  Breath sounds clear to auscultation               Abdominal         Other Findings            Anesthetic Plan    ASA: 2  Anesthesia type: total IV anesthesia          Induction: Intravenous  Anesthetic plan and risks discussed with: Patient    Friend present

## 2020-12-17 NOTE — OP NOTES
Hand Surgery Operative Note      Adwoa Cole   72 y.o.   female      Pre-op diagnosis: Left middle Trigger Finger, left medial epicondylitis  Post op diagnosis: same      Procedure: Left  middle Trigger Finger Release, left medial epicondyle steroid ienjction (CPT 61626, 58894)     Surgeon: Moe Amaya MD, PhD      Anesthesia: Cynthia Jj     Tourniquet time:   Total Tourniquet Time Documented:  Forearm (Left) - 10 minutes  Total: Forearm (Left) - 10 minutes        Procedure indications: Patient with catching and locking of the above mentioned finger(s) which have been recalcitrant to nonoperative measures. After Thorough discussion, the patient decided to proceed with surgical management. We discussed in detail surgical risks including scar, pain, bleeding, infection, anesthetic risks, neurovascular injury, need for further surgery,  weakness, stiffness, risk of death and potential risk of other unforseen complication. Procedure description:      Patient was placed in the supine position and after appropriate time-out and side, site and procedure confirmed. The anesthesia team provided a Michelle Block. The incision was made in the palm in line with the distal palmar crease along the Left middle finger under loupe magnification. Blunt dissection was used to identify the A1 pulley as well as the neurovascular bundle on each side of the flexor tendon. Blunt retraction was used to protect the neurovascular bundles. Sharp incision was made on top of the A1 pulley and scissor dissection was used to extend the sheath incision proximally to release the palmar pulley and distally until the A2 pulley was encountered. The flexor tendons were then mobilized and not catching or clicking was identified. Wound was irrigated and hemostasis was obtained with bipolar cautery. Skin edges were   infiltrated with . 25% Bupivacaine. Wound then closed with 4-0 rapide and sterile dressing applied.       The left medial epicondyle was injected with 2cc of depomedrol and 2cc of 0.25% bupivacaine. Th elbow was kept in extension during the injection to avoid injury to the ulnar nerve. Disposition: To PACU with no complications and follow up per routine. Patient is instructed to remove dressings in five days and other precautions include avoidance of heavy and repetitive lifting for 2 weeks, when an appointment for follow up and suture removal will take place.      Nikko Cortés MD  12/17/20  10:49 AM

## 2020-12-17 NOTE — PERIOP NOTES
[id number]- pt has a history of vertigo and has need to elevate head of stretcher slowly to reduce the incidence of dizziness, have elevated head slowly to the point of 90 degrees and pt will attempt to dress for discharge, has denied dizziness

## 2020-12-17 NOTE — DISCHARGE INSTRUCTIONS
Postoperative  Instructions:      Weightbearing or Lifting:  Limit  weight  lifting  to  less  than  2  pounds  (coffee  mug)  for  the  first  2  weeks  after  surgery. Dressing  instructions:    Keep  your  dressing  and/or  splint  clean  and  dry  at  all  times. You  can  remove  your  dressing  on  post-operative  day  #5  and  change  with  a  dry/sterile  dressing  or  Band-Aids  as  needed  thereafter. Showering  Instructions:  May  shower  But keep surgical dressing clean and dry until removed as explained above. After dressing is removed, you may allow soapy water to run through the incision during showers but do not scrub. After each shower, pat dry and apply a dry dressing. Do  not  soak  your  Incision in still water or bathtub  for  3  weeks  after  surgery. If  the  incision  gets  wet otherwise,  pat  dry  and  do  not  scrub  the  incision. Do  not  apply  cream  or  lotion  to  incision      Pain  Control:  - You  have  been  given  a  prescription  to  be  taken  as  directed  for  post-operative  pain  control. In  addition,  elevate  the  operative  extremity  above  the  heart  at  all  times  to  prevent  swelling  and  throbbing  pain. - If you develop constipation while taking narcotic pain medications (Norco, Hydrocodone, Percocet, Oxycodone, Dilaudid, Hydromorphone) take  over-the-counter  Colace,  100mg  by  mouth  twice  a  Day. - Nausea  is  a  common  side  effect  of  many  pain  medications. You  will  want  to  eat something  before  taking  your  pain  medicine  to  help  prevent  Nausea. - If  you  are  taking  a  prescription  pain  medication  that  contains  acetaminophen,  we  recommend  that  you  do  not  take  additional  over  the  counter  acetaminophen  (Tylenol®).       Other  pain  relieving  options:   - Using  a  cold  pack  to  ice  the  affected  area  a  few  times  a  day  (15  to  20  minutes  at  a  time)  can  help  to  relieve pain,  reduce  swelling  and  bruising.      - Elevation  of  the  affected  area  can  also  help  to  reduce  pain  and  swelling. Did  you  receive  a  nerve  Block? A  nerve  block  can  provide  pain  relief  for  one  hour  to  two  days  after  your  surgery. As  long  as  the  nerve  block  is  working,  you  will  experience  little  or  no  sensation  in  the  area  the  surgeon  operated  on. As  the  nerve  block  wears  off,  you  will  begin  to  experience  pain  or  discomfort. It  is  very  important  that  you  begin  taking  your  prescribed  pain  medication  before  the  nerve  block  fully  wears  off. The first sign that the nerve block is wearing off is tingling in your fingers. Treating  your  pain  at  the  first  sign  of  the  block  wearing  off  will  ensure  your  pain  is  better  controlled  and  more  tolerable  when  full-sensation  returns. Do  not  wait  until  the  pain  is  intolerable,  as  the  medicine  will  be  less  effective. It  is  better  to  treat  pain  in  advance  than  to  try  and  catch  up. General  Anesthesia or Sedation:      If  you  did  not  receive  a  nerve  block  during  your  surgery,  you  will  need  to  start  taking  your  pain  medication  shortly  after  your  surgery  and  should  continue  to  do  so  as  prescribed  by  your  surgeon. Please  call  [phone]  with any concern and ask to speak with Cleo Lofton. Concerning problems include:      -  Excessive  redness  of  the  incisions      -  Drainage  for  more  than  2  Days after surgery or any foul smelling drainage  -  Fever  of  more  than  101.5  F      Please  call  [phone]  if  you  do  not  receive  or  are  unsure  of  your  first  follow-up  appointment. You  should  see  the  doctor  10-14  days  after  your  Surgery. Thank you for choosing me and [address] [place] [address] for your care.  I will go above and beyond to ensure you receive the best care possible. Tara Vela MD, PhD    ACTIVITY  · As tolerated and as directed by your doctor. · Bathe or shower as directed by your doctor. DIET  · Clear liquids until no nausea or vomiting; then light diet for the first day. · Advance to regular diet on second day, unless your doctor orders otherwise. · If nausea and vomiting continues, call your doctor. PAIN  · Take pain medication as directed by your doctor. · Call your doctor if pain is NOT relieved by medication. · DO NOT take aspirin of blood thinners unless directed by your doctor. CALL YOUR DOCTOR IF   · Excessive bleeding that does not stop after holding pressure over the area  · Temperature of 101 degrees F or above  · Excessive redness, swelling or bruising, and/ or green or yellow, smelly discharge from incision    AFTER ANESTHESIA   · For the first 24 hours: DO NOT Drive, Drink alcoholic beverages, or Make important decisions. · Be aware of dizziness following anesthesia and while taking pain medication. DISCHARGE SUMMARY from Nurse    PATIENT INSTRUCTIONS:    After general anesthesia or intravenous sedation, for 24 hours or while taking prescription Narcotics:  · Limit your activities  · Do not drive and operate hazardous machinery  · Do not make important personal or business decisions  · Do  not drink alcoholic beverages  · If you have not urinated within 8 hours after discharge, please contact your surgeon on call. *  Please give a list of your current medications to your Primary Care Provider. *  Please update this list whenever your medications are discontinued, doses are      changed, or new medications (including over-the-counter products) are added. *  Please carry medication information at all times in case of emergency situations.       These are general instructions for a healthy lifestyle:    No smoking/ No tobacco products/ Avoid exposure to second hand smoke    Surgeon General's Warning:  Quitting smoking now greatly reduces serious risk to your health. Obesity, smoking, and sedentary lifestyle greatly increases your risk for illness    A healthy diet, regular physical exercise & weight monitoring are important for maintaining a healthy lifestyle    You may be retaining fluid if you have a history of heart failure or if you experience any of the following symptoms:  Weight gain of 3 pounds or more overnight or 5 pounds in a week, increased swelling in our hands or feet or shortness of breath while lying flat in bed. Please call your doctor as soon as you notice any of these symptoms; do not wait until your next office visit. Recognize signs and symptoms of STROKE:    F-face looks uneven    A-arms unable to move or move unevenly    S-speech slurred or non-existent    T-time-call 911 as soon as signs and symptoms begin-DO NOT go       Back to bed or wait to see if you get better-TIME IS BRAIN. Learning About COVID-19 and Social Distancing  What is it? Social distancing means putting space between yourself and other people. The recommended distance is 6 feet, or about 2 meters. This also means staying away from any place where people may gather, such as foreman or other public gathering places. Why is it important? Social distancing is the best way to reduce the spread of COVID-19. This virus seems to spread from person to person through droplets from coughing and sneezing. So if you keep your distance from others, you're less likely to get it or spread it. And social distancing is important for everyone, not just those who are at high risk of infection, like older people. You might have the virus but not have symptoms. You could then give the infection to someone you come into contact with. How is it done? Putting 6 feet, or about 2 meters, between you and other people is the recommended distance.  Also stay away from any place where people may gather, such as foreman or other public gathering places. So if possible:  · Work from home, and keep your kids at home. · Don't travel if you don't have to. And avoid public transportation, ride-shares, and taxis unless you have no choice. · Limit shopping to essentials, like food and medicines. · Wear a cloth face cover if you have to go to a public place like the grocery store or pharmacy. · Don't eat in restaurants. (You can still get takeout or food deliveries.)  · Avoid crowds and busy places. Follow stay-at-home orders or other directions for your area. Current as of: July 10, 2020               Content Version: 12.6  © 6644-4923 [facility] [employer]. Care instructions adapted under license by [employer] (which disclaims liability or warranty for this information). If you have questions about a medical condition or this instruction, always ask your healthcare professional. [address] any warranty or liability for your use of this information.

## 2021-01-16 NOTE — HPI: FULL BODY SKIN EXAMINATION
Addended by: JENNIFER PONCE on: 1/16/2021 10:30 AM     Modules accepted: Orders    
What Is The Reason For Today's Visit?: Full Body Skin Examination
What Is The Reason For Today's Visit? (Being Monitored For X): concerning skin lesions on an annual basis
How Severe Are Your Spot(S)?: moderate

## 2021-02-03 ENCOUNTER — APPOINTMENT (RX ONLY)
Dept: URBAN - METROPOLITAN AREA CLINIC 24 | Facility: CLINIC | Age: 66
Setting detail: DERMATOLOGY
End: 2021-02-03

## 2021-02-03 DIAGNOSIS — Z85.828 PERSONAL HISTORY OF OTHER MALIGNANT NEOPLASM OF SKIN: ICD-10-CM

## 2021-02-03 DIAGNOSIS — L57.0 ACTINIC KERATOSIS: ICD-10-CM

## 2021-02-03 DIAGNOSIS — Z71.89 OTHER SPECIFIED COUNSELING: ICD-10-CM

## 2021-02-03 DIAGNOSIS — L81.4 OTHER MELANIN HYPERPIGMENTATION: ICD-10-CM

## 2021-02-03 DIAGNOSIS — L57.8 OTHER SKIN CHANGES DUE TO CHRONIC EXPOSURE TO NONIONIZING RADIATION: ICD-10-CM | Status: STABLE

## 2021-02-03 PROBLEM — L29.8 OTHER PRURITUS: Status: ACTIVE | Noted: 2021-02-03

## 2021-02-03 PROBLEM — F41.9 ANXIETY DISORDER, UNSPECIFIED: Status: ACTIVE | Noted: 2021-02-03

## 2021-02-03 PROBLEM — D23.71 OTHER BENIGN NEOPLASM OF SKIN OF RIGHT LOWER LIMB, INCLUDING HIP: Status: ACTIVE | Noted: 2021-02-03

## 2021-02-03 PROCEDURE — 17000 DESTRUCT PREMALG LESION: CPT

## 2021-02-03 PROCEDURE — ? LIQUID NITROGEN

## 2021-02-03 PROCEDURE — 99213 OFFICE O/P EST LOW 20 MIN: CPT | Mod: 25

## 2021-02-03 PROCEDURE — ? COUNSELING

## 2021-02-03 ASSESSMENT — LOCATION SIMPLE DESCRIPTION DERM
LOCATION SIMPLE: POSTERIOR NECK
LOCATION SIMPLE: LEFT UPPER BACK
LOCATION SIMPLE: RIGHT SHOULDER
LOCATION SIMPLE: LEFT EYEBROW
LOCATION SIMPLE: RIGHT UPPER BACK
LOCATION SIMPLE: CHEST

## 2021-02-03 ASSESSMENT — LOCATION DETAILED DESCRIPTION DERM
LOCATION DETAILED: RIGHT POSTERIOR SHOULDER
LOCATION DETAILED: LEFT SUPERIOR LATERAL UPPER BACK
LOCATION DETAILED: RIGHT SUPERIOR UPPER BACK
LOCATION DETAILED: UPPER STERNUM
LOCATION DETAILED: LEFT LATERAL EYEBROW
LOCATION DETAILED: RIGHT ANTERIOR SHOULDER
LOCATION DETAILED: LEFT LATERAL TRAPEZIAL NECK
LOCATION DETAILED: RIGHT MEDIAL UPPER BACK

## 2021-02-03 ASSESSMENT — LOCATION ZONE DERM
LOCATION ZONE: TRUNK
LOCATION ZONE: NECK
LOCATION ZONE: FACE
LOCATION ZONE: ARM

## 2021-02-03 NOTE — PROCEDURE: LIQUID NITROGEN
Duration Of Freeze Thaw-Cycle (Seconds): 5
Post-Care Instructions: I reviewed with the patient in detail post-care instructions. Patient is to wear sunprotection, and avoid picking at any of the treated lesions. Pt may apply Vaseline to crusted or scabbing areas.
Render Note In Bullet Format When Appropriate: No
Consent: The patient's consent was obtained including but not limited to risks of crusting, scabbing, blistering, scarring, darker or lighter pigmentary change, recurrence, incomplete removal and infection.
Number Of Freeze-Thaw Cycles: 2 freeze-thaw cycles
Detail Level: Simple

## 2021-02-17 ENCOUNTER — HOSPITAL ENCOUNTER (OUTPATIENT)
Dept: PHYSICAL THERAPY | Age: 66
Discharge: HOME | End: 2021-02-17
Payer: COMMERCIAL

## 2021-02-17 PROCEDURE — 97110 THERAPEUTIC EXERCISES: CPT

## 2021-02-17 PROCEDURE — 97161 PT EVAL LOW COMPLEX 20 MIN: CPT

## 2021-02-17 NOTE — PROGRESS NOTES
Peterson Elmore  : 1955  Payor: [facility] / Plan: [id number] / Product Type: O /  [address] [facility] at [address] [place] [address]. [facility], [address] [place] [address]  [phone]   [phone]                                                          Halie Dexter NP      OUTPATIENT PHYSICAL THERAPY: Daily Treatment Note 2021 Visit Count:  1    Tx Diagnosis:  Pain in left hand (E09.486)  Stiffness of left hand, not elsewhere classified (M25.642)      Pre-treatment Symptoms/Complaints: See Initial Eval Dated 21 for more details. Pain: Initial:0/10  Medications Last Reviewed:  2021     Post Session: 0/10   Updated Objective Findings: See Initial Eval for more details. TREATMENT:   THERAPEUTIC EXERCISE: (10 minutes):  Exercises per grid below to improve mobility, strength and balance. Required minimal visual, verbal and manual cues to promote proper body alignment and promote proper body posture. Progressed resistance and complexity of movement as indicated. Date:  2021 Date:   Date:     Activity/Exercise Parameters Parameters Parameters   Education HEP, POC, PT goals, anatomy/pathology                                               THERAPEUTIC ACTIVITY: ( 0 minutes): Activities per gid below to improve functional movement related mobility, strength and balance to improve neuro-muscular carryover to daily functional activities for improving patient's quality of life. Required visual, verbal and manual cues to promote proper body alignment and promote proper body posture/mechanics. Progressed resistance and complexity of movement as indicated.      Date:  2021 Date:   Date:     Activity/Exercise Parameters Parameters Parameters                                                                               MANUAL THERAPY: (0 minutes): Joint mobilization, Soft tissue mobilization was utilized and necessary because of the patient's restricted joint motion and restricted motion of soft tissue mobility.        Date  2/17/2021    Technique Used Grade  Level # Time(s) Effect while being performed                                                                 HEP Log Date 1.    2/17/2021   2.  2/17/2021   3. 2/17/2021   4.    5.           [employer] Portal  Treatment/Session Summary:    Response to Treatment: Pt demonstrated understanding of POC and initial HEP. No increase in pain or adverse reactions.   Communication/Consultation:  POC, HEP, PT goals, Faxed initial evaluation to MD.   Equipment provided today: HEP Handout   Recommendations/Intent for next treatment session:   Next visit will focus on tendon glides, scar mobility, edema managment,  strengthening.         Treatment Plan of Care Effective Dates: 2/17/2021 TO 3/19/2021 (30 days).  Frequency/Duration: 2 times a week for 30 Days             Total Treatment Billable Duration:   10  Rx plus Eval   PT Patient Time In/Time Out  Time In: 1515  Time Out: 1600  Ayanna Rose, RT    Future Appointments   Date Time Provider Department Center   2/22/2021  4:45 PM Ayanna Rose, RT [facility]   3/1/2021  3:15 PM Ayanna Rose, RT [facility]   3/3/2021  3:15 PM Ayanna Rose, RT [facility]   3/8/2021  3:15 PM Ayanna Rose, RT [facility]   3/15/2021  3:15 PM Ayanna Rose, RT [facility]   3/17/2021  3:15 PM Ayanna Rose, RT [facility]

## 2021-02-17 NOTE — THERAPY EVALUATION
Alex Reyna  : 1955      Payor: [facility] / Plan: [address] / Product Type: PPO /    [address], [address] [place] [address], [facility], [address] [place] [address]  [phone]   [phone]              OUTPATIENT PHYSICAL THERAPY:Initial Assessment 2021  Visit # 1   ICD-10: Treatment Diagnosis:   Pain in left hand (J83.389)  Stiffness of left hand, not elsewhere classified (Q16.564)                 Precautions/Allergies:   Levaquin [levofloxacin], Cellcept [mycophenolate mofetil], and Other medication   Fall Risk Score: 0 (? 5 = High Risk)  MD Orders: Eval and Treat    MEDICAL/REFERRING DIAGNOSIS:  Trigger finger, left middle finger [M65.332]   DATE OF ONSET: 20  REFERRING PHYSICIAN: Fatmata Dexter NP  RETURN PHYSICIAN APPOINTMENT: TBD by patient      INITIAL ASSESSMENT:  Ms. Alex Reyna has attended 1 physical therapy session including initial evaluation. Ms. Van Wells presents with decreased functional use, strength and range of motion of her left upper extremity that is affecting her independence with activities of daily living and ability to perform job tasks. I feel that Ms. Van Wells will benefit from skilled physical therapy to maximize the functional use of her upper extremity in daily activities and work tasks. Alex Reyna will benefit from skilled PT (medically necessary) to address above deficits affecting participation in basic ADLs and overall functional tolerance. PROBLEM LIST (Impacting functional limitations):  · Decreased Strength  · Decreased ADL/Functional Activities  · Decreased Transfer Abilities  · Increased Pain  · Decreased Activity Tolerance  · Decreased Flexibility/Joint Mobility  · Decreased Somers with Home Exercise Program INTERVENTIONS PLANNED:    1. Patient Education  2. Home Exercise Program (HEP)  3. Manual Therapy  4. Range of Motion (ROM)  5.  Therapeutic Activites  6. Therapeutic Exercise/Strengthening  7. Ultrasound  8. Paraffin  9. Splinting         TREATMENT PLAN:  Effective Dates: 2/17/2021 TO 3/18/2021 (30 days). Frequency/Duration: 2 times a week for 30 Days  GOALS: (Goals have been discussed and agreed upon with patient.)   Short-Term Goals~4 weeks  Goal Met   1. [address] will improve DASH score by 4 points 1. [] Date:   2. [address] will increase motion in left to full composite fist to improve functional use of upper extremity in ADL activities. 2.  [] Date:   3. [address] will Increase  strength in left hand by 10 pounds to allow patient to  and lift objects during self care activities. 3.  [] Date:   4. [address] will have no limitations in work ability 4. [] Date:   5       Goal Met   1.  1.  [] Date:   2.  2.  [] Date:   3.  3.  [] Date:   4.  4.  [] Date:            Outcome Measure: Tool Used: Disabilities of the Arm, Shoulder and Hand (DASH) Questionnaire - Quick Version  Score:  Initial: 16/55  Most Recent: X/55 (Date: -- )   Interpretation of Score: The DASH is designed to measure the activities of daily living in person's with upper extremity dysfunction or pain. Each section is scored on a 1-5 scale, 5 representing the greatest disability. The scores of each section are added together for a total score of 55. Medical Necessity:   · Skilled intervention required due to deficits and impairments seen upon initial evaluation affecting patient's participation in ADLs and functional tasks. *  ·   Reason for Services/Other Comments:  · Patient requires skilled intervention due to deficits and impairments seen upon initial evaluation affecting patient's participation in ADLs and functional tasks.     Future Appointments   Date Time Provider Manisha Alfaro   2/22/2021  4:45 PM Tony Rose, RT [facility]   3/1/2021  3:15 PM Tony Rose, RT [facility]   3/3/2021  3:15 PM Papenfukaleigh, Jez Rafita, RT [facility]   3/8/2021  3:15 PM Papenfuss, Jezsoraya Eller, RT [facility]   3/15/2021  3:15 PM Papenfukaleigh, Jez Eller, RT [facility]   3/17/2021  3:15 PM Papenfuss, Jezsoraya Eller, RT [facility]       Total Treatment Duration:      PT Patient Time In/Time Out  Time In: 1515  Time Out: [address] Papenfuss, RT  Rehabilitation Potential For Stated Goals: Good  Regarding Adwoa Mcghee's therapy, I certify that the treatment plan above will be carried out by a therapist or under their direction. Thank you for this referral,  Bernice Roblero, RT       Referring Physician Signature: Brady Buchanan, NP _________________________  Date _________               HISTORY:   History of Present Injury/Illness (Reason for Referral):  Chronic left hand pain and stiffness was catching even with use of of splint. Surgery on left hand 21. Hand is still stiff and swollen. Pain Scale:  Current: 0/10  Best:  0/10  Worst:  0/10    · Aggravating factors: carrying and gripping, can't snap fingers   · Relieving factors: moving fingers  · Irritability: Low (Onset of pain is is longer than the time it takes for Pain to go away)      Past Medical History/Comorbidities:   Ms. Jony Vickers  has a past medical history of Allergic rhinitis, cause unspecified (2015), Anxiety, Anxiety, BPPV (benign paroxysmal positional vertigo), Chronic pain, Closed fracture of medial malleolus, Ear problems, Enthesopathy of ankle and tarsus, unspecified, Esophageal reflux (2015), GERD (gastroesophageal reflux disease), Gestational diabetes (), Hallux valgus (acquired), and History of gestational diabetes.   Ms. Jony Vickers  has a past surgical history that includes hx bunionectomy (Right); hx  section (, ); hx appendectomy (); hx endoscopy (2017); hx colonoscopy (2017); hx abdominal wall defect repair; hx orthopaedic (Left, ); and hx orthopaedic (Right, early 2000's). Social History/Living Environment:       Prior Level of Function/Work/Activity:  unrestricted         Ambulatory/Rehab Services H2 Model Falls Risk Assessment    Risk Factors:       No Risk Factors Identified Ability to Rise from Chair:       (0)  Ability to rise in a single movement    Falls Prevention Plan:       No modifications necessary   Total: (5 or greater = High Risk): 0      ©2010 [facility] of [facility]. All Rights Reserved. [facility] [employer] Patent #[id number]. Federal Law prohibits the replication, distribution or use without written permission from [facility] of [address] [place] [address]         Current Medications:    Current Outpatient Medications:     OTHER,NON-FORMULARY,, Take 1 Cap by mouth daily. Eye vitamin, Disp: , Rfl:     ARCH SUPPORT ORTHOTIC misc, Full sole orthodics and extra depth shoes, Disp: 1 Each, Rfl: 0    calcium carbonate (Calcium 600) 600 mg calcium (1,500 mg) tablet, Take 600 mg by mouth two (2) times a day., Disp: , Rfl:     meclizine (ANTIVERT) 25 mg tablet, Take 1 Tab by mouth daily as needed for Dizziness. , Disp: 20 Tab, Rfl: 5    cholecalciferol (VITAMIN D3) 400 unit tab tablet, Take  by mouth daily. , Disp: , Rfl:     busPIRone (BUSPAR) 10 mg tablet, Take 1 Tab by mouth three (3) times daily as needed. (Patient taking differently: Take 10 mg by mouth three (3) times daily as needed. Patient takes 5 mg , prn), Disp: 60 Tab, Rfl: 0    ascorbic acid, vitamin C, (VITAMIN C) 500 mg tablet, Take  by mouth., Disp: , Rfl:     multivitamin (ONE A DAY) tablet, Take 1 Tab by mouth daily. , Disp: , Rfl:     Biotin 2,500 mcg cap, Take  by mouth., Disp: , Rfl:     potassium 99 mg tablet, Take 99 mg by mouth daily. , Disp: , Rfl:     Cetirizine 10 mg cap, Take  by mouth., Disp: , Rfl:     OTHER, Indications: Zatador eye drops as needed, Disp: , Rfl:     fluticasone (FLONASE) 50 mcg/actuation nasal spray, daily. , Disp: , Rfl:     SOY ISOFLA/BLK COHOSH/MAG BARK (ESTROVEN PO), Take 1 Tab by mouth daily. , Disp: , Rfl:      Date Last Reviewed:  2/17/2021   Number of Personal Factors/Comorbidities that affect the Plan of Care: 1-2: MODERATE COMPLEXITY   EXAMINATION:   Observation/Orthostatic Postural Assessment:          Immature surgical 3 rd finger DPC 1.0 cm in length. Edema measurements: Left DPC 18.2 cm, Right 19.0  3rd proximal phalanx: 7.3 cm, right 6.6 cm   Palpation: Thickened surgical scar        Active Range of Motion  2/17/2021     Fingers left hand     1st DIgit index   2nd Digit middle   3rd Digit ring   4th Digit small     Extension Flexion   Extension Flexion   Extension Flexion   Extension Flexion                    MCP  left 0  Right 0 55  75  MCP Left 0  Right 0 70  85  MCP Left 0  Right 0 75  90  MCP  left 0  Right 0 85  \90   PIP Left 0  Right 0 95  95  PIP Left 0  Right 0 80  100  PIP Left 0  Right 0 95  100  PIP Left 0  Right 0 90  95   DIP Left 0  right 0 80  80  DIP Left 0  Right 0 85  80  DIP Left 0  Right 0 85  80  DIP Left 0  Right 0 85  85        Thumb     WNL  Wrist and Elbow     WNL  Passive Range of Motion  Date: 2/17/2021     WNL      Thumb   WNL    Wrist and Elbow     WNL   Strength  Date: 2/17/2021       Right  Left   Elbow Flexion 5 5   Elbow Extension 5 4   Wrist Extension 5 5   Wrist Flexion 5 4   Ulnar Deviation 5 4   Radial Deviation 5 4   Supination 5 4   Pronation 5 4         50 39 no pain   Garcia Pinch 15 11   2-point 8 6   3 jaw carina 13 11          Neurological Screen: Assessed @ Initial Visit    Radiating symptoms? no    Functional Mobility:  Assessed @ Initial Visit        Body Structures Involved:    1. Joints  2. tendons Body Functions Affected:    1. musculoskeletal  2. Movement Related Activities and Participation Affected:  1.  Mobility     Number of elements that affect the Plan of Care: 1-2: LOW COMPLEXITY   CLINICAL PRESENTATION:   Presentation: Stable and uncomplicated: LOW COMPLEXITY   CLINICAL DECISION MAKING:      Use of outcome tool(s) and clinical judgement create a POC that gives a: Clear prediction of patient's progress: LOW COMPLEXITY                    remember to save as eval

## 2021-02-22 ENCOUNTER — HOSPITAL ENCOUNTER (OUTPATIENT)
Dept: PHYSICAL THERAPY | Age: 66
Discharge: HOME | End: 2021-02-22
Payer: COMMERCIAL

## 2021-02-22 PROCEDURE — 97140 MANUAL THERAPY 1/> REGIONS: CPT

## 2021-02-22 PROCEDURE — 97110 THERAPEUTIC EXERCISES: CPT

## 2021-02-22 PROCEDURE — 97035 APP MDLTY 1+ULTRASOUND EA 15: CPT

## 2021-02-22 NOTE — PROGRESS NOTES
Liliya Dinero  : 1955  Payor: [facility] / Plan: [id number] / Product Type: PPO /  Lucía Liriano at [address] [facility], [address] [place] [address]  [phone]   [phone]                                                          Dakota Dexter NP      OUTPATIENT PHYSICAL THERAPY: Daily Treatment Note 2021 Visit Count:  2    Tx Diagnosis:  Pain in left hand (F40.317)  Stiffness of left hand, not elsewhere classified (M25.642)      Pre-treatment Symptoms/Complaints: See Initial Eval Dated 21 for more details. Pain: Initial:0/10  Medications Last Reviewed:  2021     Post Session: 0/10   Updated Objective Findings: See Initial Eval for more details. TREATMENT:   THERAPEUTIC EXERCISE: (8 minutes):  Exercises per grid below to improve mobility, strength and balance. Required minimal visual, verbal and manual cues to promote proper body alignment and promote proper body posture. Progressed resistance and complexity of movement as indicated. Date:  2021 Date:  21 Date:     Activity/Exercise Parameters Parameters Parameters   Education HEP, POC, PT goals, anatomy/pathology HEP, POC, PT goals, anatomy/pathology    Tendon glides  10 x ea    Putty rolling  20 x                                  THERAPEUTIC ACTIVITY: ( 0 minutes): Activities per gid below to improve functional movement related mobility, strength and balance to improve neuro-muscular carryover to daily functional activities for improving patient's quality of life. Required visual, verbal and manual cues to promote proper body alignment and promote proper body posture/mechanics. Progressed resistance and complexity of movement as indicated.      Date:  2021 Date:   Date:     Activity/Exercise Parameters Parameters Parameters                                                                               MANUAL THERAPY: (15 minutes): Joint mobilization, Soft tissue mobilization was utilized and necessary because of the patient's restricted joint motion and restricted motion of soft tissue mobility. Date  2/22/2021    Technique Used Grade  Level # Time(s) Effect while being performed   Scar tissue massage     Increase mobility decrease pain                                               MODALITIES: (8 minutes): *  Ultrasound was used today secondary to the patient having tightened structures limiting joint motion that require an increase in extensibility. Ultrasound was used today to increase tendon flexibility. Hot Pack (10 min) was performed prior to ultrasound followed by soft tissue mobilization and ex. HEP Log Date 1. Tendon glides, putty ex 2/22/2021   2.  2/22/2021   3. 2/22/2021   4.    5.           [employer] Portal  Treatment/Session Summary:    Response to Treatment: Pt demonstrated understanding of POC and initial HEP. No increase in pain or adverse reactions. Communication/Consultation:  Perform HEP as directed   Equipment provided today: HEP Handout   Recommendations/Intent for next treatment session:   Next visit will focus on tendon glides, scar mobility, edema managment,  strengthening. Treatment Plan of Care Effective Dates: 2/17//2021 TO 3/19/2021 (30 days).   Frequency/Duration: 2 times a week for 30 Days             Total Treatment Billable Duration:   31  Rx plus heat prior to treatment   PT Patient Time In/Time Out  Time In: 1625  Time Out: 360 Ebony, RT    Future Appointments   Date Time Provider Manisha Alfaro   3/1/2021  3:15 PM ZhoufuBenjamin farris, RT [facility]   3/3/2021  3:15 PM PapenfussBenjamin Marisela, RT [facility]   3/8/2021  3:15 PM PapenfussBenjamin Marisela, RT [facility]   3/15/2021  3:15 PM PapenfussBenjamin Marisela, RT [facility]   3/17/2021  3:15 PM PapenfussBenjmain Marisela, RT [facility]   4/13/2021  3:15 PM Matheus Gresham MD AMIE Davidson

## 2021-03-01 ENCOUNTER — APPOINTMENT (OUTPATIENT)
Dept: PHYSICAL THERAPY | Age: 66
End: 2021-03-01
Payer: COMMERCIAL

## 2021-03-03 ENCOUNTER — APPOINTMENT (OUTPATIENT)
Dept: PHYSICAL THERAPY | Age: 66
End: 2021-03-03
Payer: COMMERCIAL

## 2021-03-08 ENCOUNTER — HOSPITAL ENCOUNTER (OUTPATIENT)
Dept: PHYSICAL THERAPY | Age: 66
Discharge: HOME | End: 2021-03-08
Payer: COMMERCIAL

## 2021-03-08 PROCEDURE — 97035 APP MDLTY 1+ULTRASOUND EA 15: CPT

## 2021-03-08 PROCEDURE — 97140 MANUAL THERAPY 1/> REGIONS: CPT

## 2021-03-08 PROCEDURE — 97110 THERAPEUTIC EXERCISES: CPT

## 2021-03-08 NOTE — PROGRESS NOTES
uSsan Glass  : 1955  Payor: [facility] / Plan: [address] / Product Type: O /  [address] [facility] at [address] [place] [address]. [facility], [address] [place] [address]  [phone]   [phone]                                                          Nallely Dexter NP      OUTPATIENT PHYSICAL THERAPY: Daily Treatment Note 3/8/2021 Visit Count:  3    Tx Diagnosis:  Pain in left hand (Q17.890)  Stiffness of left hand, not elsewhere classified (M25.642)      Pre-treatment Symptoms/Complaints: See Initial Eval Dated 21 for more details. Pain: Initial:0/10  Medications Last Reviewed:  3/8/2021     Post Session: 0/10   Updated Objective Findings: Middle PIP 90 flexion         TREATMENT:   THERAPEUTIC EXERCISE: (15 minutes):  Exercises per grid below to improve mobility, strength and balance. Required minimal visual, verbal and manual cues to promote proper body alignment and promote proper body posture. Progressed resistance and complexity of movement as indicated. Date:  2021 Date:  21 Date:  3/8/21   Activity/Exercise Parameters Parameters Parameters   Education HEP, POC, PT goals, anatomy/pathology HEP, POC, PT goals, anatomy/pathology Progression of HEP   Tendon glides  10 x ea 10 x   Putty rolling  20 x 20 x   Intrinsic plus putty   20 x   Hook fist position with blocking board   20 x                     THERAPEUTIC ACTIVITY: ( 0 minutes): Activities per gid below to improve functional movement related mobility, strength and balance to improve neuro-muscular carryover to daily functional activities for improving patient's quality of life. Required visual, verbal and manual cues to promote proper body alignment and promote proper body posture/mechanics. Progressed resistance and complexity of movement as indicated.      Date:  3/8/2021 Date:   Date:     Activity/Exercise Parameters Parameters Parameters                   [place]                                                       MANUAL THERAPY: (15 minutes): Joint mobilization, Soft tissue mobilization was utilized and necessary because of the patient's restricted joint motion and restricted motion of soft tissue mobility. Date  3/8/2021    Technique Used Grade  Level # Time(s) Effect while being performed   Scar tissue massage     Increase mobility decrease pain                                               MODALITIES: 10 minutes): *  Ultrasound was used today secondary to the patient having tightened structures limiting joint motion that require an increase in extensibility. Ultrasound was used today to increase tendon flexibility. Hot Pack/parafin  (10 min) was performed prior to ultrasound followed by soft tissue mobilization and ex. HEP Log Date 1. Tendon glides, putty ex 2/22/2021   2.  3/8/2021   3. 3/8/2021   4.    5.           [employer] Portal  Treatment/Session Summary:    Response to Treatment: stiffness main complaint   Communication/Consultation:  Perform HEP as directed   Equipment provided today: HEP Handout   Recommendations/Intent for next treatment session:   Next visit will focus on tendon glides, scar mobility, edema managment,  strengthening. Treatment Plan of Care Effective Dates: 2/17//2021 TO 3/19/2021 (30 days).   Frequency/Duration: 2 times a week for 30 Days             Total Treatment Billable Duration:   40  Rx plus heat prior to treatment   PT Patient Time In/Time Out  Time In: 1520  Time Out: RT [place]    Future Appointments   Date Time Provider Manisha Alfaro   3/15/2021  3:15 PM [facility]   3/17/2021  3:15 PM Africa Rose RT [facility]   4/13/2021  3:15 PM MD AMIE Ramirez

## 2021-03-15 ENCOUNTER — HOSPITAL ENCOUNTER (OUTPATIENT)
Dept: PHYSICAL THERAPY | Age: 66
Discharge: HOME | End: 2021-03-15
Payer: COMMERCIAL

## 2021-03-15 PROCEDURE — 97140 MANUAL THERAPY 1/> REGIONS: CPT

## 2021-03-15 PROCEDURE — 97035 APP MDLTY 1+ULTRASOUND EA 15: CPT

## 2021-03-15 PROCEDURE — 97110 THERAPEUTIC EXERCISES: CPT

## 2021-03-15 NOTE — PROGRESS NOTES
Mary Mackey  : 1955  Payor: [facility] STATE / Plan: [address] / Product Type: PPO /  [address] at [address]. Adeline Marrufo, Keegan Fuller, [address]  [phone]   [phone]                                                          Jaz Dexter NP      OUTPATIENT PHYSICAL THERAPY: Daily Treatment Note 3/15/2021 Visit Count:  4    Tx Diagnosis:  Pain in left hand (G48.022)  Stiffness of left hand, not elsewhere classified (M25.642)      Pre-treatment Symptoms/Complaints: Stiffness   Pain: Initial:0/10  Medications Last Reviewed:  3/15/2021     Post Session: 0/10   Updated Objective Findings: Middle PIP 90 flexion         TREATMENT:   THERAPEUTIC EXERCISE: 8  minutes):  Exercises per grid below to improve mobility, strength and balance. Required minimal visual, verbal and manual cues to promote proper body alignment and promote proper body posture. Progressed resistance and complexity of movement as indicated. Date:  2021 Date:  21 Date:  3/8/21 Date  3/15/21   Activity/Exercise Parameters Parameters Parameters    Education HEP, POC, PT goals, anatomy/pathology HEP, POC, PT goals, anatomy/pathology Progression of HEP Progression to green putty for  and glides   Tendon glides  10 x ea 10 x 10 x    Putty rolling  20 x 20 x    Intrinsic plus putty   20 x    Hook fist position with blocking board   20 x 20 x                        THERAPEUTIC ACTIVITY: ( 0 minutes): Activities per gid below to improve functional movement related mobility, strength and balance to improve neuro-muscular carryover to daily functional activities for improving patient's quality of life. Required visual, verbal and manual cues to promote proper body alignment and promote proper body posture/mechanics. Progressed resistance and complexity of movement as indicated.      Date:  3/15/2021 Date:   Date:     Activity/Exercise Parameters Parameters Parameters                                                                               MANUAL THERAPY: (15 minutes): Joint mobilization, Soft tissue mobilization was utilized and necessary because of the patient's restricted joint motion and restricted motion of soft tissue mobility. Date  3/15/2021    Technique Used Grade  Level # Time(s) Effect while being performed   Scar tissue massage     Increase mobility decrease pain                                               MODALITIES: 10 minutes): *  Ultrasound was used today secondary to the patient having tightened structures limiting joint motion that require an increase in extensibility. Ultrasound was used today to increase tendon flexibility. Hot Pack/parafin  (10 min) was performed prior to ultrasound followed by soft tissue mobilization and ex. HEP Log Date 1. Tendon glides, putty ex 2/22/2021   2.  3/15/2021   3. 3/15/2021   4.    5.           [employer] Portal  Treatment/Session Summary:    Response to Treatment: stiffness main complaint   Communication/Consultation:  Perform HEP as directed   Equipment provided today: HEP Handout   Recommendations/Intent for next treatment session:   Next visit progress to HEP and DC         Treatment Plan of Care Effective Dates: 2/17//2021 TO 3/19/2021 (30 days).   Frequency/Duration: 2 times a week for 30 Days             Total Treatment Billable Duration:   40  Rx plus heat prior to treatment   PT Patient Time In/Time Out  Time In: 1515  Time Out: [address], RT    Future Appointments   Date Time Provider Manisha Alfaro   3/17/2021  3:15 PM Saud Rose, RT [facility]   4/13/2021  3:15 PM MD AMIE Brownlee

## 2021-03-17 ENCOUNTER — HOSPITAL ENCOUNTER (OUTPATIENT)
Dept: PHYSICAL THERAPY | Age: 66
Discharge: HOME | End: 2021-03-17
Payer: COMMERCIAL

## 2021-03-17 PROCEDURE — 97035 APP MDLTY 1+ULTRASOUND EA 15: CPT

## 2021-03-17 PROCEDURE — 97140 MANUAL THERAPY 1/> REGIONS: CPT

## 2021-03-17 NOTE — PROGRESS NOTES
Charleen Graham  : 1955  Payor: [facility] / Plan: [address] / Product Type: PPO /  Shanice Gene at [address]. [facility] [address] [facility], [id number] [place] [address] Road  [phone]   [phone]                                                          Consuelo Dexter NP      OUTPATIENT PHYSICAL THERAPY: Daily Treatment Note 3/17/2021 Visit Count:  5    Tx Diagnosis:  Pain in left hand (P02.189)  Stiffness of left hand, not elsewhere classified (M25.642)      Pre-treatment Symptoms/Complaints: DASH 13   Pain: Initial:0/10  Medications Last Reviewed:  3/17/2021     Post Session: 0/10   Updated Objective Findings: Middle  ext 0        TREATMENT:   THERAPEUTIC EXERCISE:5  minutes):  Exercises per grid below to improve mobility, strength and balance. Required minimal visual, verbal and manual cues to promote proper body alignment and promote proper body posture. Progressed resistance and complexity of movement as indicated. Date:  2021 Date:  21 Date:  3/8/21 Date  3/15/21 Date  3/17/21   Activity/Exercise Parameters Parameters Parameters     Education HEP, POC, PT goals, anatomy/pathology HEP, POC, PT goals, anatomy/pathology Progression of HEP Progression to green putty for  and glides Review of DC HEP   Tendon glides  10 x ea 10 x 10 x  10 x   Putty rolling  20 x 20 x     Intrinsic plus putty   20 x     Hook fist position with blocking board   20 x 20 x                             MANUAL THERAPY: (15 minutes): Joint mobilization, Soft tissue mobilization was utilized and necessary because of the patient's restricted joint motion and restricted motion of soft tissue mobility. Date  3/17/2021    Technique Used Grade  Level # Time(s) Effect while being performed   Scar tissue massage     Increase mobility decrease pain                                               MODALITIES: 10 minutes):       *  Ultrasound was used today secondary to the patient having tightened structures limiting joint motion that require an increase in extensibility. Ultrasound was used today to increase tendon flexibility. Hot Pack/parafin  (10 min) was performed prior to ultrasound followed by soft tissue mobilization and ex. HEP Log Date 1. Tendon glides, putty ex 2/22/2021   2.  3/17/2021   3. 3/17/2021   4.    5.           [employer] Portal  Treatment/Session Summary:    Response to Treatment: No pain or stiffness   Communication/Consultation:  Perform HEP as directed DC    Equipment provided today: HEP Handout    Recommendations/Intent   DC to HEP         Treatment Plan of Care Effective Dates: 2/17//2021 TO 3/19/2021 (30 days).   Frequency/Duration: 2 times a week for 30 Days             Total Treatment Billable Duration:  30 min  PT Patient Time In/Time Out  Time In: 1530  Time Out: [address], RT    Future Appointments   Date Time Provider Manisha Alfaro   3/19/2021 11:30 AM Wm Woodward, CCC-A [facility]  ENT   4/13/2021  3:15 PM MD AMIE Hardy

## 2021-03-18 NOTE — THERAPY DISCHARGE
Cade Bond : 1955 Payor: [id number] / [place] / Product Type: PPO /  
 [address] at [facility] [address] [facility], [address] [facility], [facility], [address] [place] [address] [phone]   [phone] OUTPATIENT PHYSICAL THERAPY:Discharge 3/18/2021  Visit # 5 ICD-10: Treatment Diagnosis:  
Pain in left hand (I03.642) Stiffness of left hand, not elsewhere classified (M25.642) Precautions/Allergies:  
Levaquin [levofloxacin], Cellcept [mycophenolate mofetil], and Other medication Fall Risk Score: 0 (? 5 = High Risk) MD Orders: Eval and Treat MEDICAL/REFERRING DIAGNOSIS: 
Trigger finger, left middle finger DATE OF ONSET: 20 REFERRING PHYSICIAN: Matias Dexter, OMAR 
RETURN PHYSICIAN APPOINTMENT: TBD by patient FINAL ASSESSMENT:  Ms. Cade Bond has attended 5 physical therapy sessions including initial evaluation. She has been discharged today having achieved all of her initial goals. TREATMENT PLAN: 
Effective Dates: 2021 TO 3/18/2021 (30 days). Frequency/Duration: 2 times a week for 30 Days GOALS: (Goals have been discussed and agreed upon with patient.) Short-Term Goals~4 weeks  Goal Met 1. Cade Bond will improve DASH score by 4 points 1. [x] Date: 3/17/21 2. Cade Bond will increase motion in left to full composite fist to improve functional use of upper extremity in ADL activities. 2.  [x] Date: 3. Cade Bond will Increase  strength in left hand by 10 pounds to allow patient to  and lift objects during self care activities. 3.  [x] Date: 4. Cade Bond will have no limitations in work ability 4. [x] Date:  
5 Goal Met Outcome Measure: Tool Used: Disabilities of the Arm, Shoulder and Hand (DASH) Questionnaire - Quick Version Score:  Initial:   Most Recent:  (Date: 3/17/21 ) Interpretation of Score: The DASH is designed to measure the activities of daily living in person's with upper extremity dysfunction or pain. Each section is scored on a 1-5 scale, 5 representing the greatest disability. The scores of each section are added together for a total score of 55. Future Appointments Date Time Provider Manisha Angelina 3/19/2021 11:30 AM Juan Ramon Olsen, Wm, [address] [place] [address] [address]  
2021  3:15 PM MD AMIE Fraser Thank you for this referral, 
Kash Rose, PT CHT HISTORY:  
History of Present Injury/Illness (Reason for Referral): Chronic left hand pain and stiffness was catching even with use of of splint. Surgery on left hand 21. Hand is still stiff and swollen. Pain Scale: 
Current: 0/10 Best:  0/10 Worst:  0/10 · Aggravating factors: heavy gripping · Relieving factors: moving fingers · Irritability: Low (Onset of pain is is longer than the time it takes for Pain to go away) Past Medical History/Comorbidities: Ms. Melody Waldrop  has a past medical history of Allergic rhinitis, cause unspecified (2015), Anxiety, Anxiety, BPPV (benign paroxysmal positional vertigo), Chronic pain, Closed fracture of medial malleolus, Ear problems, Enthesopathy of ankle and tarsus, unspecified, Esophageal reflux (2015), GERD (gastroesophageal reflux disease), Gestational diabetes (), Hallux valgus (acquired), History of 2019 novel coronavirus disease (COVID-19) (2021), and History of gestational diabetes. Ms. Melody Waldrop  has a past surgical history that includes hx bunionectomy (Right); hx  section (, ); hx appendectomy (); hx endoscopy (2017); hx colonoscopy (2017); hx abdominal wall defect repair; hx orthopaedic (Left, ); and hx orthopaedic (Right, early ). Social History/Living Environment:  
   Prior Level of Function/Work/Activity: 
unrestricted Current Medications:   
Current Outpatient Medications:  
  meclizine (ANTIVERT) 25 mg tablet, Take 1 Tab by mouth daily as needed for Dizziness. , Disp: 20 Tab, Rfl: 5 
  OTHER,NON-FORMULARY,, Take 1 Cap by mouth daily. Eye vitamin, Disp: , Rfl:  
  ARCH SUPPORT ORTHOTIC misc, Full sole orthodics and extra depth shoes, Disp: 1 Each, Rfl: 0 
  calcium carbonate (Calcium 600) 600 mg calcium (1,500 mg) tablet, Take 600 mg by mouth two (2) times a day., Disp: , Rfl:  
  cholecalciferol (VITAMIN D3) 400 unit tab tablet, Take  by mouth daily. , Disp: , Rfl:  
  busPIRone (BUSPAR) 10 mg tablet, Take 1 Tab by mouth three (3) times daily as needed. (Patient taking differently: Take 10 mg by mouth three (3) times daily as needed. Patient takes 5 mg , prn), Disp: 60 Tab, Rfl: 0 
  ascorbic acid, vitamin C, (VITAMIN C) 500 mg tablet, Take  by mouth., Disp: , Rfl:  
  multivitamin (ONE A DAY) tablet, Take 1 Tab by mouth daily. , Disp: , Rfl:   Biotin 2,500 mcg cap, Take  by mouth., Disp: , Rfl:  
  potassium 99 mg tablet, Take 99 mg by mouth daily. , Disp: , Rfl:  
  Cetirizine 10 mg cap, Take  by mouth., Disp: , Rfl:  
  OTHER, Indications: Zatador eye drops as needed, Disp: , Rfl:  
  fluticasone (FLONASE) 50 mcg/actuation nasal spray, daily. , Disp: , Rfl:  
  SOY ISOFLA/BLK COHOSH/MAG BARK (ESTROVEN PO), Take 1 Tab by mouth daily. , Disp: , Rfl:   
 
Date Last Reviewed:  3/18/2021 EXAMINATION:  
Observation/Orthostatic Postural Assessment:   
      Immature surgical 3 rd finger DPC 1.0 cm in length. Palpation: No pain complaints as of DC Active Range of Motion 3/17/2021 Fingers left hand Full composite fist, full finger extension Thumb WNL Wrist and Elbow WNL Passive Range of Motion Date: 3/17/2021 WNL Strength Date: 3/17/2021 Right  Left Elbow Flexion 5 5 Elbow Extension 5 5 Wrist Extension 5 5 Wrist Flexion 5 5 Ulnar Deviation 5 5 Radial Deviation 5 5 Supination 5 5 Pronation 5 5  50 45 no pain Garcia Pinch 15 12  
2-point 8 6  
3 jaw carina 13 11 Neurological Screen: Assessed @ Initial Visit Radiating symptoms? no 
 
Functional Mobility:  Assessed @ Initial Visit

## 2021-11-04 PROBLEM — M77.02 GOLFERS ELBOW OF LEFT UPPER EXTREMITY: Status: ACTIVE | Noted: 2021-11-04

## 2021-11-04 PROBLEM — M25.522 LEFT ELBOW PAIN: Status: ACTIVE | Noted: 2021-11-04

## 2021-11-22 ENCOUNTER — HOSPITAL ENCOUNTER (OUTPATIENT)
Dept: PHYSICAL THERAPY | Age: 66
Discharge: HOME | End: 2021-11-22
Attending: NURSE PRACTITIONER
Payer: COMMERCIAL

## 2021-11-22 DIAGNOSIS — M77.02 GOLFERS ELBOW OF LEFT UPPER EXTREMITY: ICD-10-CM

## 2021-11-22 PROCEDURE — 97140 MANUAL THERAPY 1/> REGIONS: CPT

## 2021-11-22 PROCEDURE — 97161 PT EVAL LOW COMPLEX 20 MIN: CPT

## 2021-11-22 NOTE — THERAPY EVALUATION
Jocelynn Black  : 1955  Primary: Huong Michaud  [place] [address] Mary Christensen at [facility] SUL[place]  [address] [facility], [address] [place] [address],[address], [facility] [address]  [phone]   [phone]       OUTPATIENT PHYSICAL THERAPY:Initial Assessment 2021     ICD-10: Treatment Diagnosis: left elbow pain M25.522, medial epicondylitis M77.02  Precautions/Allergies:   Levaquin [levofloxacin], Cellcept [mycophenolate mofetil], and Other medication   TREATMENT PLAN:  Effective Dates: 2021 TO 2022 (60 days). Frequency/Duration: 1 time a week for 60 Day(s) MEDICAL/REFERRING DIAGNOSIS:  Golfers elbow of left upper extremity [M77.02]   DATE OF ONSET: ~1 year ago  REFERRING PHYSICIAN: Adria Aguillon NP MD Orders: Evaluation and treat, dry needling  Return MD Appointment: return as needed     INITIAL ASSESSMENT:  Ms. Cherylene Saunas presents with complaints of L elbow pain. She presents with signs and symptoms consisted with referring diagnosis of golfers elbow. She presents with decreased L UE strength, pain, and difficulty performing ADLs. She will benefit from skilled physical therapy to increase functional mobility and decrease pain. PROBLEM LIST (Impacting functional limitations):  1. Decreased Strength  2. Decreased ADL/Functional Activities  3. Increased Pain INTERVENTIONS PLANNED: (Treatment may consist of any combination of the following)  1. Manual Therapy  2. Therapeutic Exercise/Strengthening     GOALS: (Goals have been discussed and agreed upon with patient.)  Discharge Goals: Time Frame: 60 days  1. Pt will increase L UE strength to 5/5 to return to yard work and ADLs. 2. Pt will report pain 1/10 with daily activities. 3. Pt will report she is able to get in her car and drive with pain 3/63.  4. Pt will be independent with HEP.     OUTCOME MEASURE:   Tool Used: Disabilities of the Arm, Shoulder and Hand (DASH) Questionnaire - Quick Version  Score:  Initial: 17/55  Most Recent: X/55 (Date: -- )   Interpretation of Score: The DASH is designed to measure the activities of daily living in person's with upper extremity dysfunction or pain. Each section is scored on a 1-5 scale, 5 representing the greatest disability. The scores of each section are added together for a total score of 55. MEDICAL NECESSITY:   · Patient demonstrates good rehab potential due to higher previous functional level. REASON FOR SERVICES/OTHER COMMENTS:  · Patient continues to require skilled intervention due to pain and decreased UE strength limiting her ability to perform ADLs. Total Duration:  PT Patient Time In/Time Out  Time In: 1355  Time Out: 1445    Rehabilitation Potential For Stated Goals: Good  Regarding Adwoa Lucio Mcghee's therapy, I certify that the treatment plan above will be carried out by a therapist or under their direction. Thank you for this referral,    Tanya Goss, PT      Referring Physician Signature: Roise Shoulders, NP _______________________________ Date _____________       PAIN/SUBJECTIVE:   Initial:   4/5 worst, 0/10 best Post Session:  3/10   HISTORY:   History of Injury/Illness (Reason for Referral):  Pt reports pain started ~ 1 year ago. She has had 3 injections in the elbow that seem to help short term. Elbow feels stiff and \"hurts\". She has difficulty closing car door and steering, holding phone to her left ear. MRI showed a small tear in the elbow. She wants to try dry needling to see if helps with pain.    Past Medical History/Comorbidities:   Ms. Romayne Bunk  has a past medical history of Allergic rhinitis, cause unspecified (7/1/2015), Anxiety, Anxiety, BPPV (benign paroxysmal positional vertigo), Chronic pain, Closed fracture of medial malleolus, Ear problems, Enthesopathy of ankle and tarsus, unspecified, Esophageal reflux (7/1/2015), GERD (gastroesophageal reflux disease), Gestational diabetes (1990), Hallux valgus (acquired), History of 2019 novel coronavirus disease (COVID-19) (2021), and History of gestational diabetes. Ms. Araujo Records  has a past surgical history that includes hx bunionectomy (Right); hx  section (, ); hx appendectomy (); hx endoscopy (2017); hx colonoscopy (2017); hx abdominal wall defect repair; hx orthopaedic (Left, ); and hx orthopaedic (Right, early ). Social History/Living Environment:     Lives alone  Prior Level of Function/Work/Activity:  Retired but works part time teaching teachers. She does yard work and has a puppy that she is training. Dominant Side:         RIGHT   Ambulatory/Rehab Services H2 Model Falls Risk Assessment   Risk Factors:       No Risk Factors Identified Ability to Rise from Chair:       (0)  Ability to rise in a single movement   Falls Prevention Plan:       No modifications necessary   Total: (5 or greater = High Risk): 0    [facility]. All Rights Reserved. [facility] Patent #[id number]. Federal Law prohibits the replication, distribution or use without written permission from [facility] [employer]   Current Medications:       Current Outpatient Medications:     meloxicam (MOBIC) 15 mg tablet, TAKE 1 TABLET BY MOUTH EVERY DAY, Disp: 30 Tablet, Rfl: 0    zinc 50 mg tab tablet, Take  by mouth daily. , Disp: , Rfl:     meclizine (ANTIVERT) 25 mg tablet, Take 1 Tab by mouth daily as needed for Dizziness. , Disp: 20 Tab, Rfl: 5    OTHER,NON-FORMULARY,, Take 1 Cap by mouth daily. Eye vitamin, Disp: , Rfl:     ARCH SUPPORT ORTHOTIC misc, Full sole orthodics and extra depth shoes, Disp: 1 Each, Rfl: 0    calcium carbonate (Calcium 600) 600 mg calcium (1,500 mg) tablet, Take 600 mg by mouth two (2) times a day., Disp: , Rfl:     cholecalciferol (VITAMIN D3) 400 unit tab tablet, Take  by mouth daily. , Disp: , Rfl:     busPIRone (BUSPAR) 10 mg tablet, Take 1 Tab by mouth three (3) times daily as needed.  (Patient taking differently: Take 10 mg by mouth three (3) times daily as needed. Patient takes 5 mg , prn), Disp: 60 Tab, Rfl: 0    ascorbic acid, vitamin C, (VITAMIN C) 500 mg tablet, Take  by mouth., Disp: , Rfl:     multivitamin (ONE A DAY) tablet, Take 1 Tab by mouth daily. , Disp: , Rfl:     Biotin 2,500 mcg cap, Take  by mouth., Disp: , Rfl:     potassium 99 mg tablet, Take 99 mg by mouth daily. , Disp: , Rfl:     Cetirizine 10 mg cap, Take  by mouth., Disp: , Rfl:     OTHER, Indications: Zatador eye drops as needed, Disp: , Rfl:     fluticasone (FLONASE) 50 mcg/actuation nasal spray, daily. , Disp: , Rfl:     SOY ISOFLA/BLK COHOSH/MAG BARK (ESTROVEN PO), Take 1 Tab by mouth daily. , Disp: , Rfl:    Date Last Reviewed:  11-22-21   Number of Personal Factors/Comorbidities that affect the Plan of Care: 1-2: MODERATE COMPLEXITY   EXAMINATION:     Observation/Orthostatic Postural Assessment: Pt with mild swelling to medial elbow. Palpation: tender to MCL L elbow     ROM:       elbow ROM WNL                    Strength:   LUE Strength  L Shoulder Flexion: 4  L Shoulder Internal Rotation: 4  L Shoulder External Rotation: 4+  L Elbow Flexion: 4-  L Elbow Extension: 5  L Wrist Flexion: 4-  L Wrist Extension: 4+    RUE Strength  R Shoulder Flexion: 5  R Shoulder Internal Rotation: 5  R Shoulder External Rotation: 5           Special Tests: pain with elbow valgus stress test, negative full can for L shoulder  Neurological Screen: negative median nerve glide. Functional Mobility:  Sit to/from Stand: independent. Bed Mobility: independent. Walking on level ground: independent. Body Structures Involved:  1. Muscles  2. Ligaments Body Functions Affected:  1. Neuromusculoskeletal Activities and Participation Affected:  1.  Community, Social and [place]   Number of elements (examined above) that affect the Plan of Care: 1-2: LOW COMPLEXITY   CLINICAL PRESENTATION:   Presentation: Stable and uncomplicated: LOW COMPLEXITY   CLINICAL DECISION MAKING:   Use of outcome tool(s) and clinical judgement create a POC that gives a: Questionable prediction of patient's progress: MODERATE COMPLEXITY

## 2021-11-22 NOTE — PROGRESS NOTES
Jocelynn Black  : 1955  Payor: Khai Zabala / Plan: [address] / Product Type: PPO /  [address] at [facility] JUSTYNA WEINBERG  [address] [facility], [address], [place] [address].  [phone]   [phone]       OUTPATIENT PHYSICAL THERAPY: Daily Treatment Note 21  Visit Count: 1     ICD-10: Treatment Diagnosis: left elbow pain M25.522, medial epicondylitis M77.02  Precautions/Allergies:   Levaquin [levofloxacin], Cellcept [mycophenolate mofetil], and Other medication   TREATMENT PLAN:  Effective Dates: 2021 TO 2022 (60 days). Frequency/Duration: 1 time a week for 60 Day(s) MEDICAL/REFERRING DIAGNOSIS:  Golfers elbow of left upper extremity [M77.02]   DATE OF ONSET: ~1 year ago  REFERRING PHYSICIAN: Adria Aguillon NP MD Orders: Evaluation and treat, dry needling  Return MD Appointment: return as needed       Pre-treatment Symptoms/Complaints:  See eval  Pain: Initial:   -5/10 Post Session:  4/10   Medications Last Reviewed:  21    Updated Objective Findings:   See evaluation note from today     TREATMENT:   Manual Therapy (15 minutes): for decreased pain. Pt supine and tool assisted soft tissue mobilization to wrist flexors, supinator, and bicep muscle. Soft tissue mobilization around medial elbow and wrist flexors. HEP: reviewed HEP with wrist flexor and extensor stretch and she is to try to rest the L UE  [employer] Portal    Treatment/Session Summary:    · Response to Treatment:  no adverse reaction with tool assisted soft tissue mobilization. · Communication/Consultation:  None today  · Equipment provided today:  None today  · Recommendations/Intent for next treatment session: Next visit will focus on modalities as needed for pain and improved tissue mobility.     Total Treatment Billable Duration:  15 min + Eval  PT Patient Time In/Time Out  Time In: 1355  Time Out: [address], PT    Future Appointments   Date Time Provider Manisha Alfaro   12/2/2021  3:00 PM Candi Phalen, PT [facility]

## 2021-12-02 ENCOUNTER — HOSPITAL ENCOUNTER (OUTPATIENT)
Dept: PHYSICAL THERAPY | Age: 66
Discharge: HOME | End: 2021-12-02
Attending: NURSE PRACTITIONER
Payer: COMMERCIAL

## 2021-12-02 PROCEDURE — 97140 MANUAL THERAPY 1/> REGIONS: CPT

## 2021-12-02 NOTE — PROGRESS NOTES
Jocelynn Black  : 1955  Payor: Khai Zabala / Plan: [address] / Product Type: PPO /  [address] at [facility] JUSTYNA WEINBERG  [address] [facility], [address], [place] [address].  [phone]   [phone]       OUTPATIENT PHYSICAL THERAPY: Daily Treatment Note 21  Visit Count: 2     ICD-10: Treatment Diagnosis: left elbow pain M25.522, medial epicondylitis M77.02  Precautions/Allergies:   Levaquin [levofloxacin], Cellcept [mycophenolate mofetil], and Other medication   TREATMENT PLAN:  Effective Dates: 2021 TO 2022 (60 days). Frequency/Duration: 1 time a week for 60 Day(s) MEDICAL/REFERRING DIAGNOSIS:  Golfers elbow of left upper extremity [M77.02]   DATE OF ONSET: ~1 year ago  REFERRING PHYSICIAN: Adria Aguillon NP MD Orders: Evaluation and treat, dry needling  Return MD Appointment: return as needed       Pre-treatment Symptoms/Complaints:  Feels about the same. She did have out of town company this past weekend and had to do more. Pain: Initial:   -5/10 Post Session:  4/10   Medications Last Reviewed:  21    Updated Objective Findings:   none     TREATMENT:   Manual Therapy (40 minutes): for decreased pain. Pt supine and tool assisted soft tissue mobilization to wrist flexors, supinator, and bicep muscle. Soft tissue mobilization around medial elbow and wrist flexors with hawk  tools and without tools. Kinesio tape applied with web cut around the medial elbow. She can wear the tape for 2-3 days and remove if irritation. HEP: reviewed HEP with wrist flexor and extensor stretch and she is to try to rest the L UE  No Ch[employer] Portal    Treatment/Session Summary:    · Response to Treatment:  She did not tolerate the hawk  tools soft tissue mobilization to medial elbow.    · Communication/Consultation:  None today  · Equipment provided today:  None today  · Recommendations/Intent for next treatment session: Next visit will focus on modalities as needed for pain and improved tissue mobility. Can start on strengthening exercises.      Total Treatment Billable Duration:  40 minutes  PT Patient Time In/Time Out  Time In: 1500  Time Out: 420 Jonathan Lee PT    Future Appointments   Date Time Provider Manisha Kwoni   12/9/2021  4:15 PM Jorge Castelan PT [facility]   12/16/2021  1:00 PM Cristofer Galdamez PT [facility]

## 2021-12-08 ENCOUNTER — APPOINTMENT (RX ONLY)
Dept: URBAN - METROPOLITAN AREA CLINIC 24 | Facility: CLINIC | Age: 66
Setting detail: DERMATOLOGY
End: 2021-12-08

## 2021-12-08 DIAGNOSIS — L82.1 OTHER SEBORRHEIC KERATOSIS: ICD-10-CM

## 2021-12-08 PROCEDURE — ? COUNSELING

## 2021-12-08 PROCEDURE — 99212 OFFICE O/P EST SF 10 MIN: CPT

## 2021-12-08 ASSESSMENT — LOCATION SIMPLE DESCRIPTION DERM: LOCATION SIMPLE: LEFT CHEEK

## 2021-12-08 ASSESSMENT — LOCATION DETAILED DESCRIPTION DERM
LOCATION DETAILED: LEFT SUPERIOR LATERAL MALAR CHEEK
LOCATION DETAILED: LEFT LATERAL MALAR CHEEK

## 2021-12-08 ASSESSMENT — LOCATION ZONE DERM: LOCATION ZONE: FACE

## 2021-12-09 ENCOUNTER — HOSPITAL ENCOUNTER (OUTPATIENT)
Dept: PHYSICAL THERAPY | Age: 66
Discharge: HOME | End: 2021-12-09
Attending: NURSE PRACTITIONER
Payer: COMMERCIAL

## 2021-12-09 PROCEDURE — 97140 MANUAL THERAPY 1/> REGIONS: CPT

## 2021-12-09 PROCEDURE — 97110 THERAPEUTIC EXERCISES: CPT

## 2021-12-09 PROCEDURE — 97035 APP MDLTY 1+ULTRASOUND EA 15: CPT

## 2021-12-09 NOTE — PROGRESS NOTES
Adán Nuñez  : 1955  Payor: Khai Zabala / Plan: [address] / Product Type: PPO /  [address] at [facility] JUSTYNA WEINBERG  [address] [facility], [address], [place] [address].  [phone]   [phone]       OUTPATIENT PHYSICAL THERAPY: Daily Treatment Note 21  Visit Count: 3     ICD-10: Treatment Diagnosis: left elbow pain M25.522, medial epicondylitis M77.02  Precautions/Allergies:   Levaquin [levofloxacin], Cellcept [mycophenolate mofetil], and Other medication   TREATMENT PLAN:  Effective Dates: 2021 TO 2022 (60 days). Frequency/Duration: 1 time a week for 60 Day(s) MEDICAL/REFERRING DIAGNOSIS:  Golfers elbow of left upper extremity [M77.02]   DATE OF ONSET: ~1 year ago  REFERRING PHYSICIAN: Cesar Barron NP MD Orders: Evaluation and treat, dry needling  Return MD Appointment: return as needed       Pre-treatment Symptoms/Complaints:  Feels that last session with taping and massage was helpful. Pain: Initial:   7/10 to deep palpation Post Session:  No VAS- no increase in pain. Medications Last Reviewed:  21    Updated Objective Findings: tender at medial epicondyle with resistance to wrist flexion or pronation. TREATMENT:   Manual Therapy (18 minutes): for decreased pain. -- tool assisted soft tissue release to L pronator teres and wrist flexor complex. 1 needle used and removed  -- transverse friction massage at  L pronator teres and wrist flexor complex in a stretched position  -- Leuko tape at L medial epicondyle for support to muscle  Therapeutic Exercise (15 min)  -- pt discussion, education and practice of how to use L arm in a more protected position and with involvement of more core activation ~ 5 min  -- pt education and practice of self transverse friction massage to  L pronator teres and wrist flexor complex  -- L wrist flexor stretching 30 sec in between doing L wrist flexion with 1# weight 5x 3 sets.    Modalities (8 min) continuous US at 1.0depth, 1.5 intensity for thermal effects to tissue at medial epicondyle  HEP: pt to do updated HEP and continue to work towards using principals of braced arm and increased focus on core involvement with activities. [employer] Portal    Treatment/Session Summary:    · Response to Treatment:  Pt tolerated exercise ok but kept it at low weight and reps so as to not aggravate. Pt also tolerated manual intervention today. · Communication/Consultation:  None today  · Equipment provided today:  None today  · Recommendations/Intent for next treatment session: Next visit will focus on modalities as needed for pain and improved tissue mobility. May be able to gently progress strengthening exercises .      Total Treatment Billable Duration:  41 minutes  PT Patient Time In/Time Out  Time In: 5970  Time Out: 5236 [facility], PT    Future Appointments   Date Time Provider Manisha Alfaro   12/16/2021  1:00 PM Alexandra Saunders, PT [facility]

## 2021-12-16 ENCOUNTER — HOSPITAL ENCOUNTER (OUTPATIENT)
Dept: PHYSICAL THERAPY | Age: 66
Discharge: HOME | End: 2021-12-16
Attending: NURSE PRACTITIONER
Payer: COMMERCIAL

## 2021-12-16 PROCEDURE — 97140 MANUAL THERAPY 1/> REGIONS: CPT

## 2021-12-16 NOTE — PROGRESS NOTES
Tessa Correa  : 1955  Payor: 550Radha Zabala / Plan: [address] / Product Type: PPO /  [address] at [facility] JUSTYNA WEINBERG  [address] [facility], [address], [address] [facility]  [phone]   [phone]       OUTPATIENT PHYSICAL THERAPY: Daily Treatment Note 21  Visit Count: 4     ICD-10: Treatment Diagnosis: left elbow pain M25.522, medial epicondylitis M77.02  Precautions/Allergies:   Levaquin [levofloxacin], Cellcept [mycophenolate mofetil], and Other medication   TREATMENT PLAN:  Effective Dates: 2021 TO 2022 (60 days). Frequency/Duration: 1 time a week for 60 Day(s) MEDICAL/REFERRING DIAGNOSIS:  Golfers elbow of left upper extremity [M77.02]   DATE OF ONSET: ~1 year ago  REFERRING PHYSICIAN: Alexsandra Bolden NP MD Orders: Evaluation and treat, dry needling  Return MD Appointment: return as needed       Pre-treatment Symptoms/Complaints:  Pt reports she didn't like the tape as much as the previous session. Pain: Initial:   7/10 to deep palpation Post Session:  No VAS- no increase in pain. Medications Last Reviewed:  21    Updated Objective Findings:      TREATMENT:   Manual Therapy (40 minutes): for decreased pain. Pt supine and tool assisted soft tissue mobilization to wrist flexors, supinator, and bicep muscle. Soft tissue mobilization around medial elbow and wrist flexors with hawk  tools and without tools. Kinesio tape applied with web cut around the medial elbow. She can wear the tape for 2-3 days and remove if irritation.       HEP: pt to do updated HEP and continue to work towards using principals of braced arm and increased focus on core involvement with activities. [employer] Portal    Treatment/Session Summary:    · Response to Treatment:  Bruising to L upper arm after bicep dry needling today. Verbally reviewed UE exercises for home and self taping techniques. She is going to try exercises on her own. · Communication/Consultation:  None today  · Equipment provided today:  None today  · Recommendations/Intent for next treatment session: discharge patient from physical therapy     Total Treatment Billable Duration:  40 minutes  PT Patient Time In/Time Out  Time In: 1310  Time Out: [address] [facility] [facility], PT    No future appointments.

## 2021-12-16 NOTE — THERAPY DISCHARGE
Mary Mackey  : 1955  Primary: Stefan Mauro [facility]  Secondary: [place] [facility] at [facility]  [address] [facility], [address] [place] [address] [address], [facility] [address]  [phone]   [phone]       OUTPATIENT PHYSICAL THERAPY:Discharge Summary 2021     ICD-10: Treatment Diagnosis: left elbow pain M25.522, medial epicondylitis M77.02  Precautions/Allergies:   Levaquin [levofloxacin], Cellcept [mycophenolate mofetil], and Other medication   TREATMENT PLAN:  Discharge patient from physical therapy  MEDICAL/REFERRING DIAGNOSIS:  Medial epicondylitis, left elbow [M77.02]   DATE OF ONSET: ~1 year ago  REFERRING PHYSICIAN: Kindra Farah NP MD Orders: Evaluation and treat, dry needling  Return MD Appointment: return as needed     DISCHARGE ASSESSMENT:  Ms. Mary Kayser presents with complaints of L elbow pain. She progressed with decreased pain and improved ability to perform ADLs. She does continue to have pain with driving. She has started going to the gym and plans to work on cardio and strengthening exercises there. Instructed in self kinesio tape to try to elbow support and HEP for her to continue to work on elbow and wrist flexibility. She progressed towards goals but did not meet discharge goals. She is ready to try exercises on her own at home and the gym. GOALS: (Goals have been discussed and agreed upon with patient.)  Discharge Goals: Time Frame: 60 days  1. Pt will increase L UE strength to 5/5 to return to yard work and ADLs. Progressed towards  2. Pt will report pain /10 with daily activities. Progressed towards  3. Pt will report she is able to get in her car and drive with pain . Progressed towards  4. Pt will be independent with HEP.  GOAL MET    UPDATED OBJECTIVE FINDINGS:    Elbow ROM - WNLs  Strength:   LUE Strength  L Elbow Flexion: 4-  L Wrist Flexion: 4-                     OUTCOME MEASURE:   Tool Used: Disabilities of the Arm, Shoulder and Hand (DASH) Questionnaire - Quick Version  Score:  Initial: 17/55  Most Recent: X/55 (Date: -- )   Interpretation of Score: The DASH is designed to measure the activities of daily living in person's with upper extremity dysfunction or pain. Each section is scored on a 1-5 scale, 5 representing the greatest disability. The scores of each section are added together for a total score of 55.         Sunita Hall, PT

## 2022-01-12 ENCOUNTER — HOSPITAL ENCOUNTER (OUTPATIENT)
Dept: PHYSICAL THERAPY | Age: 67
Discharge: HOME | End: 2022-01-12
Attending: NURSE PRACTITIONER
Payer: COMMERCIAL

## 2022-01-12 DIAGNOSIS — M77.02 GOLFERS ELBOW OF LEFT UPPER EXTREMITY: ICD-10-CM

## 2022-01-12 PROCEDURE — 97164 PT RE-EVAL EST PLAN CARE: CPT

## 2022-01-12 PROCEDURE — 97140 MANUAL THERAPY 1/> REGIONS: CPT

## 2022-01-12 NOTE — THERAPY RECERTIFICATION
Magnolia Mendes  : 1955  Primary: Jorge Ortiz [facility]  Secondary:  [address]  at [facility] JUSTYNA WEINBERG  [address] [facility], [address] [place] [address], [address]  [phone]   [phone]       OUTPATIENT PHYSICAL THERAPY:Re-evaluation 2022     ICD-10: Treatment Diagnosis: left elbow pain M25.522, medial epicondylitis M77.02  Precautions/Allergies:   Levaquin [levofloxacin], Cellcept [mycophenolate mofetil], and Other medication   TREATMENT PLAN:  Effective Dates: 2022 TO 2022 Frequency/Duration: 1 time a week for 30 Day(s) MEDICAL/REFERRING DIAGNOSIS:  Golfers elbow of left upper extremity [M77.02]   DATE OF ONSET: ~1 year ago  REFERRING PHYSICIAN: Mackenzie Mcduffie NP MD Orders: Evaluation and treat, dry needling  Return MD Appointment: return as needed      ASSESSMENT:  Ms. Prosper Stevenson presents with complaints of L elbow pain. She presents with signs and symptoms consisted with referring diagnosis of golfers elbow. Her elbow pain was getting better until her dog pulled against his leash all weekend and she had to resist with her L arm. Her pain had returned to 7/10 at worst after incident. She will benefit from skilled physical therapy to increase functional mobility and decrease pain. PROBLEM LIST (Impacting functional limitations):  1. Decreased Strength  2. Decreased ADL/Functional Activities  3. Increased Pain INTERVENTIONS PLANNED: (Treatment may consist of any combination of the following)  1. Manual Therapy  2. Therapeutic Exercise/Strengthening     GOALS: (Goals have been discussed and agreed upon with patient.)  Discharge Goals: Time Frame: 60 days  1. Pt will increase L UE strength to 5/5 to return to yard work and ADLs. Ongoing 22  2. Pt will report pain 1/10 with daily activities. Ongoing 22  3. Pt will report she is able to get in her car and drive with pain . Ongoing 22  4. Pt will be independent with HEP.  GOAL MET 22  UPDATED OBJECTIVE FINDINGS:    Pain: 7/10 at worst  ROM: full Left elbow ROM     OUTCOME MEASURE:   Tool Used: Disabilities of the Arm, Shoulder and Hand (DASH) Questionnaire - Quick Version  Score:  Initial: 17/55  Most Recent: X/55 (Date: -- )   Interpretation of Score: The DASH is designed to measure the activities of daily living in person's with upper extremity dysfunction or pain. Each section is scored on a 1-5 scale, 5 representing the greatest disability. The scores of each section are added together for a total score of 55. MEDICAL NECESSITY:   · Patient demonstrates good rehab potential due to higher previous functional level. REASON FOR SERVICES/OTHER COMMENTS:  · Patient continues to require skilled intervention due to pain and decreased UE strength limiting her ability to perform ADLs. Total Duration: 55 minutes  PT Patient Time In/Time Out  Time In: 1415  Time Out: 1510    Rehabilitation Potential For Stated Goals: Good  Regarding Adwoa Mcghee's therapy, I certify that the treatment plan above will be carried out by a therapist or under their direction.   Thank you for this referral,    Kaylene Ricardo PT      Referring Physician Signature: Tayla Valentine NP _______________________________ Date _____________

## 2022-01-12 NOTE — PROGRESS NOTES
Savannah Garrett  : 1955  Payor: Khai Zabala / Plan: [address] / Product Type: PPO /  [address] at [facility] JUSTYNA WEINBERG  [address] [facility], [address], [place] [address].  [phone]   [phone]       OUTPATIENT PHYSICAL THERAPY: Daily Treatment Note 22  Visit Count: 1     ICD-10: Treatment Diagnosis: left elbow pain M25.522, medial epicondylitis M77.02  Precautions/Allergies:   Levaquin [levofloxacin], Cellcept [mycophenolate mofetil], and Other medication   TREATMENT PLAN:  Effective Dates: 2022 TO 2022 Frequency/Duration: 1 time a week for 30 Day(s) MEDICAL/REFERRING DIAGNOSIS:  Golfers elbow of left upper extremity [M77.02]   DATE OF ONSET: ~1 year ago  REFERRING PHYSICIAN: Macie Serna NP MD Orders: Evaluation and treat, dry needling  Return MD Appointment: return as needed       Pre-treatment Symptoms/Complaints:  Pt reports she was doing better until her dog pulled really hard against his leash this past weekend. Pain: Initial:   /10 to deep palpation Post Session:  Pt reported less pain   Medications Last Reviewed:  22    Updated Objective Findings:   See recert     TREATMENT:   Manual Therapy (30 minutes): for decreased pain. Pt supine and tool assisted soft tissue mobilization to wrist flexors and supinator. Soft tissue mobilization around medial elbow and wrist flexors.      HEP: pt to do updated HEP and continue to work towards using principals of braced arm and increased focus on core involvement with activities. [employer] Portal    Treatment/Session Summary:    · Response to Treatment:  Less tissue tightness around medial elbow after soft tissue mobilization.    · Communication/Consultation:  None today  · Equipment provided today:  None today  · Recommendations/Intent for next treatment session: discharge patient from physical therapy     Total Treatment Billable Duration:  40 minutes  PT Patient Time In/Time Out  Time In: 2032  Time Out: [address] [facility]    No future appointments.

## 2022-02-02 ENCOUNTER — HOSPITAL ENCOUNTER (OUTPATIENT)
Dept: PHYSICAL THERAPY | Age: 67
Discharge: HOME | End: 2022-02-02
Attending: NURSE PRACTITIONER
Payer: COMMERCIAL

## 2022-02-02 PROCEDURE — 97140 MANUAL THERAPY 1/> REGIONS: CPT

## 2022-02-02 PROCEDURE — 97035 APP MDLTY 1+ULTRASOUND EA 15: CPT

## 2022-02-14 ENCOUNTER — APPOINTMENT (RX ONLY)
Dept: URBAN - METROPOLITAN AREA CLINIC 24 | Facility: CLINIC | Age: 67
Setting detail: DERMATOLOGY
End: 2022-02-14

## 2022-02-14 DIAGNOSIS — L57.8 OTHER SKIN CHANGES DUE TO CHRONIC EXPOSURE TO NONIONIZING RADIATION: ICD-10-CM

## 2022-02-14 DIAGNOSIS — L57.0 ACTINIC KERATOSIS: ICD-10-CM

## 2022-02-14 DIAGNOSIS — Z85.828 PERSONAL HISTORY OF OTHER MALIGNANT NEOPLASM OF SKIN: ICD-10-CM

## 2022-02-14 DIAGNOSIS — Z71.89 OTHER SPECIFIED COUNSELING: ICD-10-CM

## 2022-02-14 PROCEDURE — ? COUNSELING

## 2022-02-14 PROCEDURE — ? LIQUID NITROGEN

## 2022-02-14 PROCEDURE — 17000 DESTRUCT PREMALG LESION: CPT

## 2022-02-14 PROCEDURE — 99213 OFFICE O/P EST LOW 20 MIN: CPT | Mod: 25

## 2022-02-14 ASSESSMENT — LOCATION DETAILED DESCRIPTION DERM
LOCATION DETAILED: RIGHT CENTRAL MALAR CHEEK
LOCATION DETAILED: LEFT LATERAL TRAPEZIAL NECK
LOCATION DETAILED: RIGHT ANTERIOR SHOULDER
LOCATION DETAILED: UPPER STERNUM
LOCATION DETAILED: RIGHT MEDIAL UPPER BACK
LOCATION DETAILED: RIGHT POSTERIOR SHOULDER

## 2022-02-14 ASSESSMENT — LOCATION SIMPLE DESCRIPTION DERM
LOCATION SIMPLE: CHEST
LOCATION SIMPLE: RIGHT UPPER BACK
LOCATION SIMPLE: POSTERIOR NECK
LOCATION SIMPLE: RIGHT SHOULDER
LOCATION SIMPLE: RIGHT CHEEK

## 2022-02-14 ASSESSMENT — LOCATION ZONE DERM
LOCATION ZONE: ARM
LOCATION ZONE: NECK
LOCATION ZONE: FACE
LOCATION ZONE: TRUNK

## 2022-02-14 NOTE — PROCEDURE: LIQUID NITROGEN
Detail Level: Simple
Render Note In Bullet Format When Appropriate: No
Show Aperture Variable?: Yes
Consent: The patient's consent was obtained including but not limited to risks of crusting, scabbing, blistering, scarring, darker or lighter pigmentary change, recurrence, incomplete removal and infection.
Duration Of Freeze Thaw-Cycle (Seconds): 3
Number Of Freeze-Thaw Cycles: 1 freeze-thaw cycle
Post-Care Instructions: I reviewed with the patient in detail post-care instructions. Patient is to wear sunprotection, and avoid picking at any of the treated lesions. Pt may apply Vaseline to crusted or scabbing areas.

## 2022-02-24 PROBLEM — H90.3 SENSORINEURAL HEARING LOSS, BILATERAL: Status: ACTIVE | Noted: 2022-02-24

## 2022-02-24 PROBLEM — H91.91 HEARING LOSS OF RIGHT EAR: Status: ACTIVE | Noted: 2022-02-24

## 2022-03-08 ENCOUNTER — HOSPITAL ENCOUNTER (OUTPATIENT)
Dept: PHYSICAL THERAPY | Age: 67
Discharge: HOME | End: 2022-03-08
Payer: COMMERCIAL

## 2022-03-08 DIAGNOSIS — M25.511 RIGHT SHOULDER PAIN, UNSPECIFIED CHRONICITY: ICD-10-CM

## 2022-03-08 PROCEDURE — 97162 PT EVAL MOD COMPLEX 30 MIN: CPT

## 2022-03-08 NOTE — THERAPY EVALUATION
Snehal Medrano  : 1955      Payor: [facility] / Plan: [address] / Product Type: [facility] /    [address] [facility] at [address] [place] [address]. [facility], [address], [facility], [address] [place] [address]  [phone]   [phone]              OUTPATIENT PHYSICAL THERAPY:Initial Assessment: 3/8/2022    ICD-10: Treatment Diagnosis:   Pain in Right Shoulder (M25.511)  Stiffness of Right Shoulder not elsewhere specififed (M25.611)              Precautions/Allergies:   Levaquin [levofloxacin], Cellcept [mycophenolate mofetil], and Other medication   Fall Risk Score: 0 (? 5 = High Risk)  MD Orders: Eval and Treat  MEDICAL/REFERRING DIAGNOSIS:  Right shoulder pain, unspecified chronicity [M25.511]   DATE OF ONSET: chronic one year  REFERRING PHYSICIAN: Rocio Adair MD  RETURN PHYSICIAN APPOINTMENT: TBD by patient      INITIAL ASSESSMENT:   Ms. Home Zamora presents to physical therapy with decreased strength, ROM, joint mobility, functional mobility, . These S/S are consistent with referring diagnosis. Patient will benefit from skilled physical therapy for manual therapeutic techniques (as appropriate), therapeutic exercises and activities, neuromuscular re-education, and comprehensive home exercises program to address current impairments and functional limitations. Snehal Medrano will benefit from skilled PT (medically necessary) in order to address above deficits affecting participation in basic ADLs and overall functional tolerance. PROBLEM LIST (Impacting functional limitations):  · Decreased Strength  · Decreased ADL/Functional Activities  · Increased Pain  · Decreased Activity Tolerance  · Increased Shortness of Breath  · Decreased Flexibility/Joint Mobility  · Decreased [place] with Home Exercise Program INTERVENTIONS PLANNED:  1. Cold  2. Family Education  3. Home Exercise Program (HEP)  4. Manual Therapy  5. Neuromuscular Re-education/Strengthening  6.  Range of Motion (ROM)  7. Therapeutic Activites  8. Therapeutic Exercise/Strengthening  9. Transfer Training  10. Ultrasound   TREATMENT PLAN:  Effective Dates: 3/8/2022 TO 5/8/2022 (60 days). Frequency/Duration: 2 times a week for 60 Days  GOALS: (Goals have been discussed and agreed upon with patient.)     Short-Term Goals~4-6 weeks  Goal Met   1. Brant Poplin will report <=0/10 pain with don/doffing clothing as well as minimal/no difficulty. 1.  [] Date:   2. Brant Poplin will demonstrate improvement in active shoulder flexion to >160 degrees to increase UE function and participation in ADLs. 2.  [] Date:   3. Brant Poplin will be able to lift 20 lbs overhead with two hands 3. [] Date:   4. Brant Poplin will show a greater than 8 point decrease on the DASH in order to show an increase in upper extremity function. 4.  [] Date:   5. Brant Poplin will be able to carry 20 pounds 100' in each hand without shoulder pain. 5.  [] Date:   6.  6.  [] Date:            Outcome Measure: Tool Used: Disabilities of the Arm, Shoulder and Hand (DASH) Questionnaire - Quick Version  Score:  Initial: 21/55  Most Recent: X/55 (Date: -- )   Interpretation of Score: The DASH is designed to measure the activities of daily living in person's with upper extremity dysfunction or pain. Each section is scored on a 1-5 scale, 5 representing the greatest disability. The scores of each section are added together for a total score of 55. Medical Necessity:   · Skilled intervention continues to be required due to deficits and impairments seen upon initial evaluation affecting patient's participation in ADLs and functional tasks. Reason for Services/Other Comments:  · Patient continues to require skilled intervention due to deficits and impairments seen upon initial evaluation affecting patient's participation in ADLs and functional tasks.     Total Treatment Duration:       Rehabilitation Potential For Stated Goals: good  Regarding Adwoa Valdes Taz's therapy, I certify that the treatment plan above will be carried out by a therapist or under their direction. Thank you for this referral,  Citlalli Godinez, PT     Referring Physician Signature: Della Palomino MD _______________________________ Date _____________            HISTORY:   History of Present Injury/Illness (Reason for Referral):  Gradual onset of right shoulder pain for a year. Pain was getting worse until the cortisone injection 22. Has been going to the [facility] since [facility], but has been avoiding doing arm ex.     -Present symptoms/complaints (on day of evaluation)  Pain Scale:  · Current: 2/10  · Best: 0/10  · Worst: 6/10    · Aggravating factors: Lifting and Overhead activities  · Relieving factors: adjusting sleeping position  · Irritability: Low (Onset of pain is is longer than the time it takes for Pain to go away)    Dominant Side:  right  Past Medical History/Comorbidities:   Ms. Salud Dick  has a past medical history of Allergic rhinitis, cause unspecified (2015), Anxiety, Anxiety, BPPV (benign paroxysmal positional vertigo), Chronic pain, Closed fracture of medial malleolus, Ear problems, Enthesopathy of ankle and tarsus, unspecified, Esophageal reflux (2015), GERD (gastroesophageal reflux disease), Gestational diabetes (), Hallux valgus (acquired), History of 2019 novel coronavirus disease (COVID-19) (2021), and History of gestational diabetes. Ms. Salud Dick  has a past surgical history that includes hx bunionectomy (Right); hx  section (, ); hx appendectomy (); hx endoscopy (2017); hx colonoscopy (2017); hx abdominal wall defect repair; hx orthopaedic (Left, ); and hx orthopaedic (Right, early ).   Social History/Living Environment:      Prior Level of Function/Work/Activity:  Unrestricted  Other Clinical Tests:         none  Current Medications:    Current Outpatient Medications:     diphenhydrAMINE (ZzzQuiL) 25 mg capsule, 1 capsule as needed, Disp: , Rfl:     multivit with calcium,iron,min (WOMEN'S DAILY MULTIVITAMIN PO), , Disp: , Rfl:     diphenhydrAMINE (BenadryL) 25 mg capsule, 1 capsule as needed, Disp: , Rfl:     meloxicam (MOBIC) 15 mg tablet, TAKE 1 TABLET BY MOUTH EVERY DAY, Disp: 30 Tablet, Rfl: 0    zinc 50 mg tab tablet, Take  by mouth daily. , Disp: , Rfl:     meclizine (ANTIVERT) 25 mg tablet, Take 1 Tab by mouth daily as needed for Dizziness. , Disp: 20 Tab, Rfl: 5    OTHER,NON-FORMULARY,, Take 1 Cap by mouth daily. Eye vitamin, Disp: , Rfl:     ARCH SUPPORT ORTHOTIC misc, Full sole orthodics and extra depth shoes, Disp: 1 Each, Rfl: 0    calcium carbonate (Calcium 600) 600 mg calcium (1,500 mg) tablet, Take 600 mg by mouth two (2) times a day., Disp: , Rfl:     cholecalciferol (VITAMIN D3) 400 unit tab tablet, Take  by mouth daily. , Disp: , Rfl:     busPIRone (BUSPAR) 10 mg tablet, Take 1 Tab by mouth three (3) times daily as needed. (Patient taking differently: Take 10 mg by mouth three (3) times daily as needed. Patient takes 5 mg , prn), Disp: 60 Tab, Rfl: 0    ascorbic acid, vitamin C, (VITAMIN C) 500 mg tablet, Take  by mouth., Disp: , Rfl:     Biotin 2,500 mcg cap, Take  by mouth., Disp: , Rfl:     potassium 99 mg tablet, Take 99 mg by mouth daily. , Disp: , Rfl:     Cetirizine 10 mg cap, Take  by mouth., Disp: , Rfl:     OTHER, Indications: Zatador eye drops as needed, Disp: , Rfl:     fluticasone (FLONASE) 50 mcg/actuation nasal spray, daily. , Disp: , Rfl:     SOY ISOFLA/BLK COHOSH/MAG BARK (ESTROVEN PO), Take 1 Tab by mouth daily. , Disp: , Rfl:         Ambulatory/Rehab Services H2 Model Falls Risk Assessment    Risk Factors:       No Risk Factors Identified Ability to Rise from Chair:       (0)  Ability to rise in a single movement    Falls Prevention Plan:       No modifications necessary   Total: (5 or greater = High Risk): 0 ©2010 [facility] [employer] States Patent #[id number]. Federal Law prohibits the replication, distribution or use without written permission from [facility] of [address]       Date Last Reviewed:  3/8/2022   Number of Personal Factors/Comorbidities that affect the Plan of Care: 1-2: MODERATE COMPLEXITY   EXAMINATION:   Observation/Orthostatic Postural Assessment:    Rounded Shoulders       ROM:    AROM/PROM         Joint: Eval Date: 3/8/2022  Re-Assess Date:  Re-Assess Date:    ACTIVE ROM (standing) RIGHT LEFT RIGHT LEFT RIGHT LEFT   Shoulder Flexion 155 145           Shoulder Abduction 160 155           Shoulder Internal Rotation (Apley's) T10 T8           Shoulder External Rotation (Apley's) T3 T4           Elbow ROM             PASSIVE ROM (supine)             Shoulder Flexion 160            Shoulder Abduction 160            Shoulder Internal Rotation 60      Theodoro Milder      Shoulder External Rotation [address]                                               Strength:    Joint: Eval Date: 3/8/2022  Re-Assess Date:  Re-Assess Date:     RIGHT LEFT RIGHT LEFT RIGHT LEFT   Shoulder Flexion 4/5 4+/5           Shoulder Abduction  (C5) 3+/5 shoulder pain mild 4/5           Shoulder Internal Rotation 4+/5 4+/5           Shoulder External Rotation 4-/5 4+/5           Elbow Flexion  (C6) 4+/5 4+/5           Elbow Extension (C7) 4+/5 4+/5           Wrist Flexion (C7)             Wrist Extension (C6)             Resisted Thumb Extension/Finger Abduction (C8/T1)             Resisted Cervical Rotation (C1):             Resisted Shoulder Shrug (C2, 3, 4):               Strength 65 50                                                      Special Tests:     Neer's: Positive for endrange pain    Neurological Screen: Assessed @ Initial Visit    Radiating symptoms?  Yes/No=glenohumeral region  Functional Mobility:  Assessed @ Initial Visit         Body Structures Involved:    1. Joints  2. Muscles   Body Functions Affected:    1. Movement Related Activities and Participation Affected:  1. Mobility     Number of elements that affect the Plan of Care: 3: MODERATE COMPLEXITY   CLINICAL PRESENTATION:   Presentation: Evolving clinical presentation with changing clinical characteristics: MODERATE COMPLEXITY   CLINICAL DECISION MAKING:      Use of outcome tool(s) and clinical judgement create a POC that gives a: Questionable prediction of patient's progress: MODERATE COMPLEXITY     See associated treatment note for treatment provided today.     Future Appointments   Date Time Provider Manisha Alfaro   4/8/2022  9:00 AM Vidya Mcdonald MD [facility] ALYSHA Rose, PT            remember to save as eval

## 2022-03-16 ENCOUNTER — HOSPITAL ENCOUNTER (OUTPATIENT)
Dept: PHYSICAL THERAPY | Age: 67
Discharge: HOME | End: 2022-03-16
Payer: COMMERCIAL

## 2022-03-16 PROCEDURE — 97110 THERAPEUTIC EXERCISES: CPT

## 2022-03-16 NOTE — PROGRESS NOTES
Cinthya Cook  : 1955  Payor: [facility] / Plan: [address] / Product Type: PPO /  [place] at [address] [facility]. Alycia Oregon., [address] [place] [address], [facility], [address] [place] [address]  [phone]   [phone]                                                          Sheila Willis MD      OUTPATIENT PHYSICAL THERAPY: Daily Treatment Note 3/16/2022 Visit Count:  2    Tx Diagnosis:  Pain in Right Shoulder (M25.511)  Stiffness of Right Shoulder not elsewhere specififed (M25.611)      Pre-treatment Symptoms/Complaints: See Initial Eval Dated 3/8/22 for more details. Pain: Initial:did not rate/10  Medications Last Reviewed:  3/16/2022     Post Session: did not rate/10   Updated Objective Findings: See Initial Eval for more details. TREATMENT:   THERAPEUTIC EXERCISE: (38 minutes):  Exercises per grid below to improve mobility, strength and balance. Required minimal visual, verbal and manual cues to promote proper body alignment and promote proper body posture. Progressed resistance and complexity of movement as indicated. Date:  3/16/2022 Date:   Date:     Activity/Exercise Parameters Parameters Parameters   Education HEP, POC, PT goals, anatomy/pathology     UBE  5 min      L stretch, cat camel, quadruped rocking 10 x ea     Shoulder shrugs 3# 2 x 10     Lat pull downs 27# 2 x 10     Seated rows 27# 2 x 10     Incline press 4# 3 x 10           THERAPEUTIC ACTIVITY: ( 0 minutes): Activities per gid below to improve functional movement related mobility, strength and balance to improve neuro-muscular carryover to daily functional activities for improving patient's quality of life. Required visual, verbal and manual cues to promote proper body alignment and promote proper body posture/mechanics. Progressed resistance and complexity of movement as indicated.      Date:  3/16/2022 Date:   Date:     Activity/Exercise Parameters Parameters Parameters                                                                              MANUAL THERAPY: (0 minutes): Joint mobilization, Soft tissue mobilization was utilized and necessary because of the patient's restricted joint motion and restricted motion of soft tissue mobility. Date  3/16/2022    Technique Used Grade  Level # Time(s) Effect while being performed                                                                 HEP Log Date 1. See ex above 3/16/2022   2.  3/16/2022   3. 3/16/2022   4.    5.           [employer] Portal  Treatment/Session Summary:    Response to Treatment: Pt demonstrated understanding of POC and initial HEP. No increase in pain or adverse reactions. Communication/Consultation:   patient given ex that she could easily perform in gym with free weights or machines. Equipment provided today: HEP Handout   Recommendations/Intent for next treatment session:   Next visit will focus on Manual Therapy RTC strengthening range of motion. Treatment Plan of Care Effective Dates: 3/8/2022 TO 5/15/2022 (60 days).   Frequency/Duration: 2 times a week for 60 Days             Total Treatment Billable Duration:   38  Rx plus Eval   PT Patient Time In/Time Out  Time In: 1120  Time Out: 4801 Jovita Moser PT    Future Appointments   Date Time Provider Manisha Alfaro   3/18/2022  9:45 AM Albert Rose, PT [facility]   3/23/2022 11:00 AM Albert Rose, PT [facility]   3/30/2022 10:30 AM PapAlbert nieto, PT [facility]   4/6/2022 10:30 AM PapAlbert nieto, PT [facility]   4/8/2022  9:00 AM MD THERESE Pineda   4/13/2022 10:30 AM PapAlbert nieto, PT [facility]

## 2022-03-18 ENCOUNTER — HOSPITAL ENCOUNTER (OUTPATIENT)
Dept: PHYSICAL THERAPY | Age: 67
Discharge: HOME | End: 2022-03-18
Payer: COMMERCIAL

## 2022-03-18 PROBLEM — H90.3 SENSORINEURAL HEARING LOSS, BILATERAL: Status: ACTIVE | Noted: 2022-02-24

## 2022-03-18 PROBLEM — Z98.890 HISTORY OF BUNIONECTOMY OF RIGHT GREAT TOE: Status: ACTIVE | Noted: 2018-02-15

## 2022-03-18 PROBLEM — J30.89 ENVIRONMENTAL AND SEASONAL ALLERGIES: Status: ACTIVE | Noted: 2017-10-25

## 2022-03-18 PROBLEM — H91.91 HEARING LOSS OF RIGHT EAR: Status: ACTIVE | Noted: 2022-02-24

## 2022-03-18 PROCEDURE — 97110 THERAPEUTIC EXERCISES: CPT

## 2022-03-18 NOTE — PROGRESS NOTES
Mehreen Thao  : 1955  Payor: [facility] / Plan: [address] / [facility] /  [address] [facility] at [address] [place] [address]. Tavo Chatterjee., [address] [place] [address], [address]  [phone]   [phone]                                                          Tabitha Esquivel MD      OUTPATIENT PHYSICAL THERAPY: Daily Treatment Note 3/18/2022 Visit Count:  3    Tx Diagnosis:  Pain in Right Shoulder (M25.511)  Stiffness of Right Shoulder not elsewhere specififed (M25.611)      Pre-treatment Symptoms/Complaints: See Initial Eval Dated 3/8/22 for more details. Pain: Initial:did not rate/10  Medications Last Reviewed:  3/18/2022     Post Session: did not rate/10   Updated Objective Findings: See Initial Eval for more details. TREATMENT:   THERAPEUTIC EXERCISE: (40 minutes):  Exercises per grid below to improve mobility, strength and balance. Required minimal visual, verbal and manual cues to promote proper body alignment and promote proper body posture. Progressed resistance and complexity of movement as indicated. Date:  3/16/2022 Date:  3/18/22 Date:     Activity/Exercise Parameters Parameters Parameters   Education HEP, POC, PT goals, anatomy/pathology     UBE  5 min  5 min     L stretch, cat camel, quadruped rocking 10 x ea 15 x ea    Shoulder shrugs 3# 2 x 10 3# 3 x 10    Lat pull downs 27# 2 x 10 30# 3 x 10     Seated rows 27# 2 x 10 30# 3 x 10     Incline press 4# 3 x 10     DB bench press   5# 3 x 10     Isometric ER   30/30 blue 5 x                 THERAPEUTIC ACTIVITY: ( 0 minutes): Activities per gid below to improve functional movement related mobility, strength and balance to improve neuro-muscular carryover to daily functional activities for improving patient's quality of life. Required visual, verbal and manual cues to promote proper body alignment and promote proper body posture/mechanics.  Progressed resistance and complexity of movement as indicated. Date:  3/18/2022 Date:   Date:     Activity/Exercise Parameters Parameters Parameters                                                                               MANUAL THERAPY: (0 minutes): Joint mobilization, Soft tissue mobilization was utilized and necessary because of the patient's restricted joint motion and restricted motion of soft tissue mobility. Date  3/18/2022    Technique Used Grade  Level # Time(s) Effect while being performed                                                                 HEP Log Date 1. See ex above 3/16/2022   2.  3/18/2022   3. 3/18/2022   4.    5.           [employer] Portal  Treatment/Session Summary:    Response to Treatment: Pt demonstrated understanding of POC and initial HEP. No increase in pain or adverse reactions. Communication/Consultation:   patient given ex that she could easily perform in gym with free weights or machines. Equipment provided today: HEP Handout   Recommendations/Intent for next treatment session:   Next visit will focus on Manual Therapy RTC strengthening range of motion. Treatment Plan of Care Effective Dates: 3/8/2022 TO 5/15/2022 (60 days).   Frequency/Duration: 2 times a week for 60 Days             Total Treatment Billable Duration:   40  Rx   PT Patient Time In/Time Out  Time In: 1355  Time Out: 1430 [facility],     Future Appointments   Date Time Provider Manisha Alfaro   3/23/2022 11:00 AM Debra Rose, PT [facility]   3/30/2022 10:30 AM Debra Rose, PT [facility]   4/6/2022 10:30 AM Debra Rose, PT [facility]   4/8/2022  9:00 AM MD THERESE Jin   4/13/2022 10:30 AM Debra Rose, PT [facility]

## 2022-03-19 PROBLEM — M77.02 GOLFERS ELBOW OF LEFT UPPER EXTREMITY: Status: ACTIVE | Noted: 2021-11-04

## 2022-03-19 PROBLEM — M25.522 LEFT ELBOW PAIN: Status: ACTIVE | Noted: 2021-11-04

## 2022-03-20 PROBLEM — S82.852A CLOSED TRIMALLEOLAR FRACTURE OF LEFT ANKLE: Status: ACTIVE | Noted: 2018-02-15

## 2022-03-23 ENCOUNTER — HOSPITAL ENCOUNTER (OUTPATIENT)
Dept: PHYSICAL THERAPY | Age: 67
Discharge: HOME | End: 2022-03-23
Payer: COMMERCIAL

## 2022-03-23 PROCEDURE — 97110 THERAPEUTIC EXERCISES: CPT

## 2022-03-23 NOTE — PROGRESS NOTES
Ruben Cox  : 1955  Payor: [facility] / Plan: [address] / Product Type: PPO /  Latrelle Poot at [address]. Leonetta Hashimoto., [address] [place] [address], [facility], [address] [place] [address]  [phone]   [phone]                                                          Eufemia Ryder MD      OUTPATIENT PHYSICAL THERAPY: Daily Treatment Note 3/23/2022 Visit Count:  4    Tx Diagnosis:  Pain in Right Shoulder (M25.511)  Stiffness of Right Shoulder not elsewhere specififed (M25.611)      Pre-treatment Symptoms/Complaints: See Initial Eval Dated 3/8/22 for more details. Pain: Initial:did not rate/10  Medications Last Reviewed:  3/23/2022     Post Session: did not rate/10   Updated Objective Findings: See Initial Eval for more details. TREATMENT:   THERAPEUTIC EXERCISE: (40 minutes):  Exercises per grid below to improve mobility, strength and balance. Required minimal visual, verbal and manual cues to promote proper body alignment and promote proper body posture. Progressed resistance and complexity of movement as indicated. Date:  3/16/2022 Date:  3/18/22 Date:  3/23/22   Activity/Exercise Parameters Parameters Parameters   Education HEP, POC, PT goals, anatomy/pathology     UBE  5 min  5 min  6 min L 4   L stretch, cat camel, quadruped rocking 10 x ea 15 x ea 15 x   Shoulder shrugs 3# 2 x 10 3# 3 x 10 4# 3 x 10   Lat pull downs 27# 2 x 10 30# 3 x 10  30# 3 x 10   Seated rows 27# 2 x 10 30# 3 x 10  30# 3 x 10   Incline press 4# 3 x 10     DB bench press   5# 3 x 10  6# 3 x 10   Isometric ER   30/30 blue 5 x  30/30 5 x               THERAPEUTIC ACTIVITY: ( 0 minutes): Activities per gid below to improve functional movement related mobility, strength and balance to improve neuro-muscular carryover to daily functional activities for improving patient's quality of life.  Required visual, verbal and manual cues to promote proper body alignment and promote proper body posture/mechanics. Progressed resistance and complexity of movement as indicated. Date:  3/23/2022 Date:   Date:     Activity/Exercise Parameters Parameters Parameters                                                                               MANUAL THERAPY: (0 minutes): Joint mobilization, Soft tissue mobilization was utilized and necessary because of the patient's restricted joint motion and restricted motion of soft tissue mobility. Date  3/23/2022    Technique Used Grade  Level # Time(s) Effect while being performed                                                                 HEP Log Date 1. See ex above 3/16/2022   2.  3/23/2022   3. 3/23/2022   4.    5.           [employer] Portal  Treatment/Session Summary:    Response to Treatment: Pt demonstrated understanding of POC and initial HEP. No increase in pain or adverse reactions. Communication/Consultation:   patient given ex that she could easily perform in gym with free weights or machines. Equipment provided today: HEP Handout   Recommendations/Intent for next treatment session:   Next visit will focus on Manual Therapy RTC strengthening range of motion. Treatment Plan of Care Effective Dates: 3/8/2022 TO 5/15/2022 (60 days).   Frequency/Duration: 2 times a week for 60 Days             Total Treatment Billable Duration:   40  Rx   PT Patient Time In/Time Out  Time In: 1100  Time Out: 111 [facility], PT    Future Appointments   Date Time Provider Manisha Alfaro   3/30/2022 10:30 AM Oziel Rose, PT [facility]   4/6/2022 10:30 AM Oziel Rose, PT [facility]   4/8/2022  9:00 AM Daniel Plaza MD [facility]   4/13/2022 10:30 AM Oziel Rose, PT [facility]

## 2022-03-30 ENCOUNTER — HOSPITAL ENCOUNTER (OUTPATIENT)
Dept: PHYSICAL THERAPY | Age: 67
Discharge: HOME | End: 2022-03-30
Payer: COMMERCIAL

## 2022-03-30 PROCEDURE — 97110 THERAPEUTIC EXERCISES: CPT

## 2022-03-30 NOTE — PROGRESS NOTES
Media Rumps  : 1955  Payor: [facility] STATE / Plan: [address] / Product Type: O /  [address] at [address] [place] [address]. [address] [place] [address] [address], [address] [place] [address]  [phone]   [phone]                                                          Bj Amanda MD      OUTPATIENT PHYSICAL THERAPY: Daily Treatment Note 3/30/2022 Visit Count:  5    Tx Diagnosis:  Pain in Right Shoulder (M25.511)  Stiffness of Right Shoulder not elsewhere specififed (M25.611)      Pre-treatment Symptoms/Complaints: pain and stiffness resolving   Pain: Initial:did not rate/10  Medications Last Reviewed:  3/30/2022     Post Session: did not rate/10   Updated Objective Findings: shoulder flexion to 150 bilateral        TREATMENT:   THERAPEUTIC EXERCISE: (40 minutes):  Exercises per grid below to improve mobility, strength and balance. Required minimal visual, verbal and manual cues to promote proper body alignment and promote proper body posture. Progressed resistance and complexity of movement as indicated. Date:  3/16/2022 Date:  3/18/22 Date:  3/23/22 Date  3/30/22   Activity/Exercise Parameters Parameters Parameters    Education HEP, POC, PT goals, anatomy/pathology      UBE  5 min  5 min  6 min L 4 5 min L 4.5   L stretch, cat camel, quadruped rocking 10 x ea 15 x ea 15 x    Shoulder shrugs 3# 2 x 10 3# 3 x 10 4# 3 x 10    Lat pull downs 27# 2 x 10 30# 3 x 10  30# 3 x 10 Kneeling 33# 3 x 10   Seated rows 27# 2 x 10 30# 3 x 10  30# 3 x 10 33# 3 x 10    Incline press 4# 3 x 10      DB bench press   5# 3 x 10  6# 3 x 10 7# 3 x 10   Isometric ER   30/30 blue 5 x  30/30 5 x    Ring stretch    10 x each side   Bird dog    10 x   Full plank on table 30\"    on table 30\"   Bent over row    5# 2 x 10   Overhead press    3#  3 x 10          THERAPEUTIC ACTIVITY: ( 0 minutes):  Activities per gid below to improve functional movement related mobility, strength and balance to improve neuro-muscular carryover to daily functional activities for improving patient's quality of life. Required visual, verbal and manual cues to promote proper body alignment and promote proper body posture/mechanics. Progressed resistance and complexity of movement as indicated. Date:  3/30/2022 Date:   Date:     Activity/Exercise Parameters Parameters Parameters                                                                               MANUAL THERAPY: (0 minutes): Joint mobilization, Soft tissue mobilization was utilized and necessary because of the patient's restricted joint motion and restricted motion of soft tissue mobility. Date  3/30/2022    Technique Used Grade  Level # Time(s) Effect while being performed                                                                 HEP Log Date 1. See ex above 3/16/2022   2.  3/30/2022   3. 3/30/2022   4.    5.           [employer] Portal  Treatment/Session Summary:    Response to Treatment: Pt demonstrated understanding of POC and initial HEP. No increase in pain or adverse reactions. Communication/Consultation:   patient given ex that she could easily perform in gym with free weights or machines. Equipment provided today: HEP Handout   Recommendations/Intent for next treatment session:   Next visit will focus on Manual Therapy RTC strengthening range of motion. Treatment Plan of Care Effective Dates: 3/8/2022 TO 5/15/2022 (60 days).   Frequency/Duration: 2 times a week for 60 Days             Total Treatment Billable Duration:   40  Rx   PT Patient Time In/Time Out  Time In: 1033  Time Out: 115 Sunitha Orona PT    Future Appointments   Date Time Provider Manisha Alfaro   4/6/2022 10:30 AM Bhupendra Rose, PT [facility] A[facility]LEY   4/8/2022  9:00 AM MD THERESE DeL eón   4/13/2022 10:30 AM Bhupendra Rose, PT [facility]

## 2022-04-04 PROBLEM — M25.511 RIGHT SHOULDER PAIN: Status: ACTIVE | Noted: 2022-04-04

## 2022-04-06 ENCOUNTER — HOSPITAL ENCOUNTER (OUTPATIENT)
Dept: PHYSICAL THERAPY | Age: 67
Discharge: HOME | End: 2022-04-06
Payer: COMMERCIAL

## 2022-04-06 PROCEDURE — 97110 THERAPEUTIC EXERCISES: CPT

## 2022-04-06 NOTE — PROGRESS NOTES
Shahanadaiana Cornejo  : 1955  Payor: [facility] / Plan: [address] / Product Type: PPO /  Shameka Ambrosia at [address], [address] [place] [address] Road  [phone]   [phone]                                                          Vikki Castro MD      OUTPATIENT PHYSICAL THERAPY: Daily Treatment Note 2022 Visit Count:  6    Tx Diagnosis:  Pain in Right Shoulder (M25.511)  Stiffness of Right Shoulder not elsewhere specififed (M25.611)      Pre-treatment Symptoms/Complaints: pain and stiffness resolving   Pain: Initial:did not rate/10  Medications Last Reviewed:  2022     Post Session: did not rate/10   Updated Objective Findings: shoulder flexion to 160 bilateral        TREATMENT:   THERAPEUTIC EXERCISE: (40 minutes):  Exercises per grid below to improve mobility, strength and balance. Required minimal visual, verbal and manual cues to promote proper body alignment and promote proper body posture. Progressed resistance and complexity of movement as indicated.      Date:  3/16/2022 Date:  3/18/22 Date:  3/23/22 Date  3/30/22 Date  22   Activity/Exercise Parameters Parameters Parameters     Education HEP, POC, PT goals, anatomy/pathology       UBE  5 min  5 min  6 min L 4 5 min L 4.5 6 min L5   L stretch, cat camel, quadruped rocking 10 x ea 15 x ea 15 x     Shoulder shrugs 3# 2 x 10 3# 3 x 10 4# 3 x 10     Lat pull downs 27# 2 x 10 30# 3 x 10  30# 3 x 10 Kneeling 33# 3 x 10 37# 3 x 10   Seated rows 27# 2 x 10 30# 3 x 10  30# 3 x 10 33# 3 x 10  37# 3 x 10   Incline press 4# 3 x 10       DB bench press   5# 3 x 10  6# 3 x 10 7# 3 x 10 8# 3 x 5   Isometric ER   30/30 blue 5 x  30/30 5 x     Ring stretch    10 x each side 10   Bird dog    10 x 10 x use yoga block   Full plank on table 30\"    on table 30\"    Bent over row    5# 2 x 10 7# 2 x 10   Overhead press    3#  3 x 10  6# 2 x 10          THERAPEUTIC ACTIVITY: ( 0 minutes): Activities per gid below to improve functional movement related mobility, strength and balance to improve neuro-muscular carryover to daily functional activities for improving patient's quality of life. Required visual, verbal and manual cues to promote proper body alignment and promote proper body posture/mechanics. Progressed resistance and complexity of movement as indicated. Date:  4/6/2022 Date:   Date:     Activity/Exercise Parameters Parameters Parameters                                                                                            HEP Log Date 1. See ex above 3/16/2022   2.  4/6/2022   3. 4/6/2022   4.    5.           [employer] Portal  Treatment/Session Summary:    Response to Treatment: Increased weight tolerance consistent increase in shoulder range of motion   Communication/Consultation:   patient given ex that she could easily perform in gym with free weights or machines. Equipment provided today: None today   Recommendations/Intent for next treatment session:   lprogress to DC next week         Treatment Plan of Care Effective Dates: 3/8/2022 TO 5/15/2022 (60 days).   Frequency/Duration: 2 times a week for 60 Days             Total Treatment Billable Duration:   40  Rx   PT Patient Time In/Time Out  Time In: 1030  Time Out: 115 Sunitha Orona PT    Future Appointments   Date Time Provider Manisha Alfaro   4/8/2022  9:00 AM Edgar Eldridge MD [facility] PO[facility] POA   4/13/2022 10:30 AM Ayush Rose, PT [facility]

## 2022-04-13 ENCOUNTER — HOSPITAL ENCOUNTER (OUTPATIENT)
Dept: PHYSICAL THERAPY | Age: 67
Discharge: HOME | End: 2022-04-13
Payer: COMMERCIAL

## 2022-04-13 PROCEDURE — 97110 THERAPEUTIC EXERCISES: CPT

## 2022-04-13 NOTE — PROGRESS NOTES
Phuc Cantor  : 1955  Payor: [facility] / Plan: [address] / Product Type: PPO /  Florance Bulls at [address] [facility]. [address], [address] [place] [address]  [phone]   [phone]                                                          Dee Rodrigues MD      OUTPATIENT PHYSICAL THERAPY: Daily Treatment Note 2022 Visit Count:  7    Tx Diagnosis:  Pain in Right Shoulder (M25.511)  Stiffness of Right Shoulder not elsewhere specififed (M25.611)      Pre-treatment Symptoms/Complaints: pain and stiffness resolving DASH 14   Pain: Initial:did not rate/10  Medications Last Reviewed:  2022     Post Session: did not rate/10   Updated Objective Findings: See DC note          TREATMENT:   THERAPEUTIC EXERCISE: (40 minutes):  Exercises per grid below to improve mobility, strength and balance. Required minimal visual, verbal and manual cues to promote proper body alignment and promote proper body posture. Progressed resistance and complexity of movement as indicated.      Date:  3/16/2022 Date:  3/18/22 Date:  3/23/22 Date  3/30/22 Date  22 Date  22   Activity/Exercise Parameters Parameters Parameters      Education HEP, POC, PT goals, anatomy/pathology        UBE  5 min  5 min  6 min L 4 5 min L 4.5 6 min L5 6 min    L stretch, cat camel, quadruped rocking 10 x ea 15 x ea 15 x      Shoulder shrugs 3# 2 x 10 3# 3 x 10 4# 3 x 10      Lat pull downs 27# 2 x 10 30# 3 x 10  30# 3 x 10 Kneeling 33# 3 x 10 37# 3 x 10 40# 3 x 10    Seated rows 27# 2 x 10 30# 3 x 10  30# 3 x 10 33# 3 x 10  37# 3 x 10 40#  3 x 10    Incline press 4# 3 x 10        DB bench press   5# 3 x 10  6# 3 x 10 7# 3 x 10 8# 3 x 5 9#  3 x 5   Isometric ER   30/30 blue 5 x  30/30 5 x      Ring stretch    10 x each side 10 10 x   Bird dog    10 x 10 x use yoga block 5 x ea side 20 sec hold   Full plank on table 30\"    on table 30\"     Bent over row    5# 2 x 10 7# 2 x 10 8# Overhead press    3#  3 x 10  6# 2 x 10  7#  2 x 10 short barbell                            HEP Log Date 1. See ex above 3/16/2022   2.  4/13/2022   3. 4/13/2022   4.    5.           [employer] Portal  Treatment/Session Summary:    Response to Treatment: Increased weight tolerance consistent increase in shoulder range of motion   Communication/Consultation:  Review of DC program   Equipment provided today: None today   Recommendations/Intent for next treatment session:   lprogress to [address] [facility] Effective Dates: 3/8/2022 TO 5/15/2022 (60 days). Frequency/Duration: 2 times a week for 60 Days             Total Treatment Billable Duration:   40  Rx   PT Patient Time In/Time Out  Time In: 1030  Time Out: [phone] [facility], PT    No future appointments.

## 2022-04-13 NOTE — THERAPY DISCHARGE
Isiah Gallardo  : 1955      Payor: [facility] / Plan: [address] / Product Type: PPO /    Doyle Betancourt at [address] Adeline Marrufo, [address], [place], [address] [place] [address]  [phone]   [phone]              OUTPATIENT PHYSICAL THERAPY:Initial Assessment: 2022    ICD-10: Treatment Diagnosis:   Pain in Right Shoulder (M25.511)  Stiffness of Right Shoulder not elsewhere specififed (M25.611)              Precautions/Allergies:   Levaquin [levofloxacin], Cellcept [mycophenolate mofetil], and Other medication   Fall Risk Score: 0 (? 5 = High Risk)  MD Orders: Eval and Treat  MEDICAL/REFERRING DIAGNOSIS:  Right shoulder pain, unspecified chronicity   DATE OF ONSET: chronic one year  REFERRING PHYSICIAN: Chris Murry MD  RETURN PHYSICIAN APPOINTMENT: TBD by patient      FINAL ASSESSMENT:   Ms. Louie Augilera has achieved majority of goals and has been discharged to exercise program at the [facility]. ·  1.    TREATMENT PLAN:  Effective Dates: 2022 TO 2022 (60 days). Frequency/Duration: 2 times a week for 60 Days  GOALS: (Goals have been discussed and agreed upon with patient.)     Short-Term Goals~4-6 weeks  Goal Met   1. Isiah Gallardo will report <=0/10 pain with don/doffing clothing as well as minimal/no difficulty. 1.  [x] Date:   2. Isiah Gallardo will demonstrate improvement in active shoulder flexion to >160 degrees to increase UE function and participation in ADLs. 2.  [x] Date:   3. Isiah Gallardo will be able to lift 20 lbs overhead with two hands 3. [x] Date:   4. Isiah Gallardo will show a greater than 8 point decrease on the DASH in order to show an increase in upper extremity function. 4.  [] Date:   5. Isiah Gallardo will be able to carry 20 pounds 100' in each hand without shoulder pain. 5.  [x] Date:   6.  6.  [] Date:            Outcome Measure:    Tool Used: Disabilities of the Arm, Shoulder and Hand (DASH) Questionnaire - Quick Version  Score:  Initial: 21/55  Most Recent: 14/55 (Date: 4/13/22   Interpretation of Score: The DASH is designed to measure the activities of daily living in person's with upper extremity dysfunction or pain. Each section is scored on a 1-5 scale, 5 representing the greatest disability. The scores of each section are added together for a total score of 55. ·     Total Treatment Duration:  PT Patient Time In/Time Out  Time In: 1030  Time Out: 1115    Rehabilitation Potential For Stated Goals: good  Regarding Adwoa Mcghee's therapy, I certify that the treatment plan above will be carried out by a therapist or under their direction.   Thank you for this referral,  Ayanna Rose, PT                 HISTORY:   EXAMINATION:   Observation/Orthostatic Postural Assessment:    Rounded Shoulders       ROM:    AROM/PROM         Joint: Eval Date: 3/8/2022  Re-Assess Date:  Re-Assess Date:    ACTIVE ROM (standing) RIGHT LEFT RIGHT LEFT RIGHT LEFT   Shoulder Flexion 155 145  170  160       Shoulder Abduction 160 155           Shoulder Internal Rotation (Apley's) T10 T8  T9  T7       Shoulder External Rotation (Apley's) T3 T4  T4  T4       Elbow ROM             PASSIVE ROM (supine)             Shoulder Flexion 160            Shoulder Abduction 160            Shoulder Internal Rotation 60      Brunetta Nipper      Shoulder External Rotation [facility]                   Strength:    Joint: Eval Date: 3/8/2022  Re-Assess Date:4/13/22  Re-Assess Date:     RIGHT LEFT RIGHT LEFT RIGHT LEFT   Shoulder Flexion 4/5 4+/5  4+  4+       Shoulder Abduction  (C5) 3+/5 shoulder pain mild 4/5  4  4+       Shoulder Internal Rotation 4+/5 4+/5  5  5       Shoulder External Rotation 4-/5 4+/5  4+  5       Elbow Flexion  (C6) 4+/5 4+/5  5  5       Elbow Extension (C7) 4+/5 4+/5  55         Wrist Flexion (C7)             Wrist Extension (C6)             Resisted Thumb Extension/Finger Abduction (C8/T1)             Resisted Cervical Rotation (C1):             Resisted Shoulder Shrug (C2, 3, 4):               Strength 65 50                                                      Special Tests:     Neer's: Positive for endrange pain right    Neurological Screen: Assessed @ Initial Visit    Radiating symptoms? Yes/No=glenohumeral region  Functional Mobility:  Assessed @ Initial Visit         Body Structures Involved:    1. Joints  2. Muscles   Body Functions Affected:    1. Movement Related Activities and Participation Affected:  1. Mobility       See associated treatment note for treatment provided today. No future appointments.       Lora Rose, PT            remember to save as eval

## 2022-05-23 ENCOUNTER — OFFICE VISIT (OUTPATIENT)
Dept: ORTHOPEDIC SURGERY | Age: 67
End: 2022-05-23
Payer: COMMERCIAL

## 2022-05-23 DIAGNOSIS — M79.672 LEFT FOOT PAIN: Primary | ICD-10-CM

## 2022-05-23 DIAGNOSIS — M20.12 HALLUX VALGUS OF LEFT FOOT: ICD-10-CM

## 2022-05-23 PROCEDURE — 99214 OFFICE O/P EST MOD 30 MIN: CPT | Performed by: ORTHOPAEDIC SURGERY

## 2022-05-23 NOTE — PROGRESS NOTES
Name: Pascale Caldwell  YOB: 1955  Gender: female  MRN: [id number]    Summary:     Left hallux valgus with TMT instability     CC: Foot Pain (left foot pain will xray today )       HPI: Pascale Caldwell is a 77 y.o. female who presents with Foot Pain (left foot pain will xray today )  . This patient presents the office of longstanding history of left worsening foot pain. She had a right bunionectomy done years ago with which she was quite pleased. This was done by Dr. Carlyn Montenegro. She has pain now with most of her shoes. This is affecting her activities of daily living. History was obtained by Patient    ROS/Meds/PSH/PMH/FH/SH: I personally reviewed the patients standard intake form. Below are the pertinents    Tobacco:  reports that she has never smoked. She has never used smokeless tobacco.  Diabetes: None  Other: none    Physical Examination:    Exam of the left foot shows hallux valgus deformity with significant TMT instability and a palpable osteophyte at the first TMT joint. She has palpable pulses and intact sensation. There are no lesser toe deformities. Imaging:   No imaging reviewed  Left foot XR: AP, Lateral, Oblique views     ICD-10-CM    1. Left foot pain  M79.672 XR FOOT LEFT (MIN 3 VIEWS)   2. Hallux valgus of left foot  M20.12       Report: AP, lateral, oblique x-ray of the foot demonstrates hallux valgus    Impression: Darylene Sawyer III, MD           Assessment:   Left hallux valgus with TMT instability    Treatment Plan:   4 This is a chronic illness/condition with exacerbation and progression  Treatment at this time: Elective major surgery with procedural risk factors  Studies ordered: NO XR needed @ Next Visit    Weight-bearing status: WBAT        Return to work/work restrictions: none  none    Discussed treatment options in the office today. I recommended a Lapidus procedure for her.   Which we discussed should be 2 weeks nonweightbearing for this and the plan accordingly. Left Lapidus bunionectomy, left Akin osteotomy, left calcaneal autograft harvest    Outpatient - 1-1/4 hours, c-arm, sagittal saw, K-wires , Padgett plates & screws , Padgett headless screws    Anesthesia -  Choice       The patient understands the diagnosis of bunions and claw toes, conservative and surgical treatment. R/C outlined including the risk of recurrence, risk of non-healing of skin and bone with/without infection,  potential loss/amputation of a toe(s) secondary to circulation loss, the risk of stiffness in the toes, and the overall risk of swelling that could be prolonged. The patient understands the use of internal and/or external k-wire type fixation and that it may take 6 months to a year before the patient can comfortably get back into regular/dress shoes. Generalized surgical risks and complications were discussed. The patient accepts and would like to proceed with surgery.

## 2022-05-24 ENCOUNTER — TELEPHONE (OUTPATIENT)
Dept: ORTHOPEDIC SURGERY | Age: 67
End: 2022-05-24

## 2022-05-24 PROBLEM — M20.12 HALLUX VALGUS OF LEFT FOOT: Status: ACTIVE | Noted: 2022-05-24

## 2022-05-24 PROBLEM — M79.672 FOOT PAIN, LEFT: Status: ACTIVE | Noted: 2022-05-24

## 2022-06-02 ENCOUNTER — OFFICE VISIT (OUTPATIENT)
Dept: INTERNAL MEDICINE CLINIC | Facility: CLINIC | Age: 67
End: 2022-06-02
Payer: COMMERCIAL

## 2022-06-02 VITALS
BODY MASS INDEX: 28 KG/M2 | DIASTOLIC BLOOD PRESSURE: 70 MMHG | HEIGHT: 64 IN | WEIGHT: 164 LBS | SYSTOLIC BLOOD PRESSURE: 110 MMHG

## 2022-06-02 DIAGNOSIS — T75.3XXA MOTION SICKNESS, INITIAL ENCOUNTER: ICD-10-CM

## 2022-06-02 DIAGNOSIS — Z71.84 TRAVEL ADVICE ENCOUNTER: ICD-10-CM

## 2022-06-02 DIAGNOSIS — R42 VERTIGO: Primary | ICD-10-CM

## 2022-06-02 PROCEDURE — 99213 OFFICE O/P EST LOW 20 MIN: CPT | Performed by: NURSE PRACTITIONER

## 2022-06-02 PROCEDURE — 1123F ACP DISCUSS/DSCN MKR DOCD: CPT | Performed by: NURSE PRACTITIONER

## 2022-06-02 RX ORDER — MECLIZINE HYDROCHLORIDE 25 MG/1
25 TABLET ORAL DAILY PRN
Qty: 30 TABLET | Refills: 5 | Status: SHIPPED | OUTPATIENT
Start: 2022-06-02

## 2022-06-02 RX ORDER — SCOLOPAMINE TRANSDERMAL SYSTEM 1 MG/1
1 PATCH, EXTENDED RELEASE TRANSDERMAL
Qty: 4 PATCH | Refills: 0 | Status: SHIPPED | OUTPATIENT
Start: 2022-06-02 | End: 2022-10-18 | Stop reason: ALTCHOICE

## 2022-06-02 RX ORDER — AZITHROMYCIN 250 MG/1
250 TABLET, FILM COATED ORAL SEE ADMIN INSTRUCTIONS
Qty: 6 TABLET | Refills: 0 | Status: SHIPPED | OUTPATIENT
Start: 2022-06-02 | End: 2022-06-07

## 2022-06-02 ASSESSMENT — PATIENT HEALTH QUESTIONNAIRE - PHQ9
2. FEELING DOWN, DEPRESSED OR HOPELESS: 0
SUM OF ALL RESPONSES TO PHQ QUESTIONS 1-9: 0
SUM OF ALL RESPONSES TO PHQ QUESTIONS 1-9: 0
SUM OF ALL RESPONSES TO PHQ9 QUESTIONS 1 & 2: 0
SUM OF ALL RESPONSES TO PHQ QUESTIONS 1-9: 0
SUM OF ALL RESPONSES TO PHQ QUESTIONS 1-9: 0
1. LITTLE INTEREST OR PLEASURE IN DOING THINGS: 0

## 2022-06-02 NOTE — PATIENT INSTRUCTIONS
Patient Education        Learning About Healthy Travel Abroad  How can you stay healthy on your trip? The best way to stay healthy on your trip is to plan ahead. Talk with your doctor several months before you travel to another country. It's important to allow enough time to get the vaccine doses that you need. For example, if you need the hepatitis A vaccine, you'll need 2 doses spaced at least 6 months apart. Also ask your doctor if there are medicines or extra safety steps that you should take. Check with your local health department or travel healthclinic for other travel tips. What can you do to prevent health problems? Get needed vaccines   Make sure you are up to date with your routine shots. They can protect you from diseases such as polio, diphtheria, and measles. These diseases are still a problem in some developing countries.  Get other vaccines you need. Your doctor or a health clinic can tell you which ones you need for your travels. Here are some examples:  ? Hepatitis A vaccine, if you travel to developing countries. ? Yellow fever vaccine, if you visit places in [place] and [place] where the disease is active. ? Typhoid fever vaccine, if you travel to [place] and [place], [place], or some areas of [place]. Bring medicines with you   If you take medicines, bring a supply that will last the length of your trip. Get a letter from your doctor that lists your medical conditions and the medicines you take. Bring prescriptions for refills if you will be gone for a long time. Also bring any medical supplies you may need such as blood sugar testing supplies or insulin needles.  If you are going to an area where malaria is a risk, ask your doctor or health clinic for a prescription to help prevent infection. This medicine works best if you take it before, during, and after your trip.  You may want to bring medicine for traveler's diarrhea.  Over-the-counter medicines include:  ? Bismuth subsalicylate (Pepto-Bismol). ? Loperamide (Imodium). Your doctor may also prescribe an antibiotic to take with you. If so, take it as directed. This can treat diarrhea if you're going to an area where modern medical care isn't readily available. Make safer choices as you travel  [address] safer sex. Using condoms can prevent sexually transmitted infections.  In areas where mosquito-borne illnesses are found, use DEET insect repellent. Wear long pants and long-sleeved shirts. Use mosquito netting to protect yourself from bites while you sleep.  Many developing countries don't have safe tap water. Only have drinks made with boiled water, such as tea and coffee. Canned or bottled carbonated drinks, such as soda, beer, wine, or water, are usually safe. Don't use ice if you don't know what kind of water was used to make it. And don't use tap water to brush your teeth.  Be aware that you could be injured in cars, boats, or public transportation. Driving can be dangerous due to bad roads, poor  training, and crowded roadways. Always wear your seat belt if available. If you hire a  or taxi, ask the  to slow down or drive more carefully if you feel unsafe.  Air pollution in some large cities can be a problem if you have asthma or other breathing problems. Avoid those cities when air quality is poor. Or stay indoors as much as possible.  Be careful around dogs and other animals. Dogs in developing countries are often not tame and may bite. Rabies is more common in tropical and subtropical regions.  If you're going to a place that's much higher above sea level than you're used to, ask your doctor how to avoid altitude sickness. The doctor may also prescribe medicine to help treat it. Where can you get the best information?  Use the Internet to find travel health information. Try these websites:  ? www.cdc.gov/travel.  This website is for the Centers for Disease Control and Prevention ([facility]).  ? www.who.int/ith/en. This website lists information from the [address]) on travel, required immunizations, and disease outbreaks.  Find out where you can get the best medical care in the region you are visiting. See the [address] [place] [address] Department's website at www.PressConnect.gov. It lists every [place] embassy worldwide. It also lists some doctors and medical facilities in those countries.  Take along the phone numbers and addresses of embassies in the areas you will visit. They can help you find a doctor or hospital. Find out if your insurance company will cover you. You may want to get special travel health insurance.  If you are taking a cruise, you can find your ship's health record on this website: www.cdc.gov/nceh/vsp. Where can you learn more? Go to https://VALOREMeb.[employer]uix. org and sign in to your [employer] account. Enter R129 in the AA Party box to learn more about \"Learning About Healthy Travel Abroad. \"     If you do not have an account, please click on the \"Sign Up Now\" link. Current as of: September 20, 2021               Content Version: 13.2  © 2006-2022 Healthwise, Incorporated. Care instructions adapted under license by [facility] ([facility]). If you have questions about a medical condition or this instruction, always ask your healthcare professional. [address] any warranty or liability for your use of this information.

## 2022-06-03 NOTE — PROGRESS NOTES
0    azithromycin (ZITHROMAX) 250 MG tablet     Sig: Take 1 tablet by mouth See Admin Instructions for 5 days 500mg on day 1 followed by 250mg on days 2 - 5     Dispense:  6 tablet     Refill:  0     Meclizine refilled. Transderm scop patch for flight and 3 day cruise. [employer] Alexis sent if needed for URI symptoms. Handout given with additional travel advice. She recently had labs done through her employer; she will bring a copy of the results by when she receives them. Return if symptoms worsen or fail to improve.     Romelia Rodriguez, NP, APRN - CNP

## 2022-06-08 ENCOUNTER — OFFICE VISIT (OUTPATIENT)
Dept: ORTHOPEDIC SURGERY | Age: 67
End: 2022-06-08
Payer: COMMERCIAL

## 2022-06-08 DIAGNOSIS — G56.01 CARPAL TUNNEL SYNDROME, RIGHT: ICD-10-CM

## 2022-06-08 DIAGNOSIS — M67.921 TENDINOPATHY OF RIGHT BICEPS TENDON: ICD-10-CM

## 2022-06-08 DIAGNOSIS — M25.511 RIGHT SHOULDER PAIN, UNSPECIFIED CHRONICITY: Primary | ICD-10-CM

## 2022-06-08 PROCEDURE — 1123F ACP DISCUSS/DSCN MKR DOCD: CPT | Performed by: ORTHOPAEDIC SURGERY

## 2022-06-08 PROCEDURE — 20610 DRAIN/INJ JOINT/BURSA W/O US: CPT | Performed by: ORTHOPAEDIC SURGERY

## 2022-06-08 RX ORDER — METHYLPREDNISOLONE ACETATE 40 MG/ML
80 INJECTION, SUSPENSION INTRA-ARTICULAR; INTRALESIONAL; INTRAMUSCULAR; SOFT TISSUE ONCE
Status: COMPLETED | OUTPATIENT
Start: 2022-06-08 | End: 2022-06-08

## 2022-06-08 RX ADMIN — METHYLPREDNISOLONE ACETATE 80 MG: 40 INJECTION, SUSPENSION INTRA-ARTICULAR; INTRALESIONAL; INTRAMUSCULAR; SOFT TISSUE at 10:29

## 2022-06-08 NOTE — PROGRESS NOTES
Name: Marzena Sanchez  YOB: 1955  Gender: female  MRN: [id number]    CC:   Chief Complaint   Patient presents with    Follow-up     right shoulder        HPI: Patient presents for follow-up of right shoulder mild glenohumeral arthritis. Patient had a subacromial injection on 2- and states mild relief with injection. Today the patient notes continued interference with sleep. States that there is some pain in the bicipital region. Denies numbness and tingling. Patient has completed physical therapy and is continuing home exercise program and believes that this does help with strength. Does also note some posterior shoulder pain. .  Sleep is mainly the issue. Has some numbness that she noticed at night at times it goes into her palm. Allergies   Allergen Reactions    Levofloxacin Other (See Comments)    Lexapro [Escitalopram] Other (See Comments)    Mycophenolate Mofetil Other (See Comments)    Ciprofloxacin      Other reaction(s):  Other- (not listed) - Allergy  Vertigo     Past Medical History:   Diagnosis Date    Allergic rhinitis, cause unspecified 7/1/2015    Anxiety     Anxiety     BPPV (benign paroxysmal positional vertigo)     Dr. Stevenson Reveaustin    Chronic pain     left shoulder    Closed fracture of medial malleolus     left foot    Ear problems     Enthesopathy of ankle and tarsus, unspecified     Esophageal reflux 7/1/2015    GERD (gastroesophageal reflux disease)     managed with diet    Gestational diabetes 1990    no treatment since    Hallux valgus (acquired)     History of 2019 novel coronavirus disease (COVID-19) 02/24/2021    History of gestational diabetes      Past Surgical History:   Procedure Laterality Date    ABDOMINAL WALL DEFECT REPAIR      lap nissen    APPENDECTOMY  1977    BUNIONECTOMY Right     toe   [address] COLONOSCOPY  04/18/2017    normal colon- repat 2022    ORTHOPEDIC SURGERY Right early 2000's trigger finger and thumb    ORTHOPEDIC SURGERY Left 1997    ankle repair    UPPER GASTROINTESTINAL ENDOSCOPY  04/18/2017    small gastric polyps less than 5mm- DR. Kristal Frias     Family History   Problem Relation Age of Onset    Cancer Maternal Grandfather     Cancer Paternal Grandmother         uterine    Stroke Maternal Grandmother     Cancer Other         Colon    Diabetes Father     Cancer Father         Bone    Cancer Mother      Social History     Socioeconomic History    Marital status:      Spouse name: Not on file    Number of children: Not on file    Years of education: Not on file    Highest education level: Not on file   Occupational History    Not on file   Tobacco Use    Smoking status: Never Smoker    Smokeless tobacco: Never Used   Substance and Sexual Activity    Alcohol use: Yes     Alcohol/week: 0.0 standard drinks    Drug use: No    Sexual activity: Not on file   Other Topics Concern    Not on file   Social History Narrative    Not on file     Social Determinants of Health     Financial Resource Strain:     Difficulty of Paying Living Expenses: Not on file   Food Insecurity:     Worried About Running Out of Food in the Last Year: Not on file    Maribel of Food in the Last Year: Not on file   Transportation Needs:     Lack of Transportation (Medical): Not on file    Lack of Transportation (Non-Medical):  Not on file   Physical Activity:     Days of Exercise per Week: Not on file    Minutes of Exercise per Session: Not on file   Stress:     Feeling of Stress : Not on file   Social Connections:     Frequency of Communication with Friends and Family: Not on file    Frequency of Social Gatherings with Friends and Family: Not on file    Attends Yazdanism Services: Not on file    Active Member of Clubs or Organizations: Not on file    Attends Club or Organization Meetings: Not on file    Marital Status: Not on file   Intimate Partner Violence:     Fear of Current or Ex-Partner: Not on file    Emotionally Abused: Not on file    Physically Abused: Not on file    Sexually Abused: Not on file   Housing Stability:     Unable to Pay for Housing in the Last Year: Not on file    Number of Places Lived in the Last Year: Not on file    Unstable Housing in the Last Year: Not on file        No flowsheet data found. Review of Systems  Non-contributory    PE right shoulder:    Full range of motion, appropriate strength. Tenderness to the biceps tendon. Negative tenderness AC joint. Mild speeds  Right hand normal sensation throughout to light touch. Positive Tinel's at carpal tunnel. Negative cubital tunnel. No obvious triggering today but does have a small nodule over the ring finger. A/Plan:     ICD-10-CM    1. Right shoulder pain, unspecified chronicity  M25.511    2. Tendinopathy of right biceps tendon  M67.921    3. Carpal tunnel syndrome, right  G56.01    We discussed that she has likely some mild carpal tunnel syndrome. Since her symptoms are mainly at night would suggest wrist bracing which we discussed. If it continues to be an issue she may need further follow-up with her hand surgeon. Discussed appropriate use of the prescription and says she has. Procedure note: After discussion of risks and benefits including, but not limited to pain, infection, steroid flare, increased blood sugar, fat necrosis, skin discoloration, and injury to blood vessels or nerves, the patient verbally consented to proceed with a glenohumeral joint injection. The affected right shoulder was sterilely prepped in standard fashion and injected with 2 cc of Depo-Medrol (40mg/ml), 2 cc of 1% Lidocaine, and 2 cc of 0.5% Marcaine into the anterior shoulder near the biceps tendon. The patient tolerated the injection well. Return After foot surgery.         Rohit Dimas MD  06/08/22

## 2022-06-27 ENCOUNTER — TELEPHONE (OUTPATIENT)
Dept: ORTHOPEDIC SURGERY | Age: 67
End: 2022-06-27

## 2022-06-27 NOTE — TELEPHONE ENCOUNTER
Sf pre [id number] surg is tomorrow, but has been exposed to covid, has a scratchy throat but [facility] says has to quarantine for 5 days, just got back from [facility], call juanito

## 2022-06-27 NOTE — TELEPHONE ENCOUNTER
JOSE RAUL PAREDES to call me on my cell phone. Patient is going to get a Covid test today at [facility] and call with results. Per Dr. Stephanie Kapadia he will operate tomorrow if test is negative.

## 2022-07-21 ENCOUNTER — PATIENT MESSAGE (OUTPATIENT)
Dept: INTERNAL MEDICINE CLINIC | Facility: CLINIC | Age: 67
End: 2022-07-21

## 2022-07-21 DIAGNOSIS — Z98.890 HISTORY OF BUNIONECTOMY OF RIGHT GREAT TOE: ICD-10-CM

## 2022-07-21 DIAGNOSIS — S82.852S CLOSED TRIMALLEOLAR FRACTURE OF LEFT ANKLE, SEQUELA: Primary | ICD-10-CM

## 2022-07-21 NOTE — TELEPHONE ENCOUNTER
Orders Placed This Encounter   Procedures    DME SUPPLY ORDER     - DME device ordered - full sole orthodics and extra depth shoes   - Diagnosis: history of bunionectomy right great toe; hx/o closed trimalleolar fracture left ankle  - Length of Need: Lifetime     Micheal Dhaliwal NP, APRN - CNP

## 2022-07-21 NOTE — TELEPHONE ENCOUNTER
From: Meryle Bott  To: Gabriel Bynum  Sent: 7/21/2022 7:45 AM EDT  Subject: Orthodics prescriotion    Since my foot surgery was canceled due to covid, I've decided to wait on for surgery. Therefore, i would like a prescription for orthodics and extra depth shoes.  Thank you

## 2022-09-13 NOTE — PROGRESS NOTES
Claudio Mustafa  : 1955  Payor: Khai Zabala / Plan: [address] / Product Type: PPO /  [address] at [facility] JUSTYNA WEINBERG  [address] [facility] [facility], [address], [place] [address].  [phone]   [phone]       OUTPATIENT PHYSICAL THERAPY: Daily Treatment Note 22  Visit Count: 2     ICD-10: Treatment Diagnosis: left elbow pain M25.522, medial epicondylitis M77.02  Precautions/Allergies:   Levaquin [levofloxacin], Cellcept [mycophenolate mofetil], and Other medication   TREATMENT PLAN:  Effective Dates: 2022 TO 2022 Frequency/Duration: 1 time a week for 30 Day(s) MEDICAL/REFERRING DIAGNOSIS:  Golfers elbow of left upper extremity [M77.02]   DATE OF ONSET: ~1 year ago  REFERRING PHYSICIAN: Barton Shone, NP MD Orders: Evaluation and treat, dry needling  Return MD Appointment: return as needed       Pre-treatment Symptoms/Complaints:  Pt reports she felt good for a couple weeks and then the pain seems to return. Pain: Initial:   5/10 to deep palpation Post Session:  Pt reported less pain   Medications Last Reviewed:  22    Updated Objective Findings:   none     TREATMENT:   Manual Therapy (30 minutes): for decreased pain. Pt supine and tool assisted soft tissue mobilization to wrist flexors and supinator. Soft tissue mobilization around lateral elbow and wrist extensors.    Therapeutic Modalities: ultrasound to the L lateral elbow for decreased pain 1 Mhz, continuous,  1.3 intensity for 8 min with pt supine for decreased pain. HEP: pt to do updated HEP and continue to work towards using principals of braced arm and increased focus on core involvement with activities. [facility] Portal    Treatment/Session Summary:    · Response to Treatment:  No complaints of pain with treatment. No adverse reaction with ultrasound.   · Communication/Consultation:  None today  · Equipment provided today:  None today  · Recommendations/Intent for next treatment session: re-assess elbow pain and look at L UE    Total Treatment Billable Duration:  40 minutes  PT Patient Time In/Time Out  Time In: 0935  Time Out: Hector 13, PT    Future Appointments   Date Time Provider aMnisha Alfaro   2/25/2022 10:00 AM Jason El MD [facility] JOYCE JOYCE Adult

## 2022-10-18 ENCOUNTER — OFFICE VISIT (OUTPATIENT)
Dept: INTERNAL MEDICINE CLINIC | Facility: CLINIC | Age: 67
End: 2022-10-18
Payer: COMMERCIAL

## 2022-10-18 VITALS
WEIGHT: 164.4 LBS | DIASTOLIC BLOOD PRESSURE: 78 MMHG | BODY MASS INDEX: 28.07 KG/M2 | OXYGEN SATURATION: 98 % | HEIGHT: 64 IN | TEMPERATURE: 97.1 F | HEART RATE: 80 BPM | SYSTOLIC BLOOD PRESSURE: 122 MMHG

## 2022-10-18 DIAGNOSIS — U09.9 COVID-19 LONG HAULER: Chronic | ICD-10-CM

## 2022-10-18 DIAGNOSIS — R06.09 DOE (DYSPNEA ON EXERTION): ICD-10-CM

## 2022-10-18 DIAGNOSIS — R06.02 SHORTNESS OF BREATH: Primary | ICD-10-CM

## 2022-10-18 DIAGNOSIS — R94.31 ABNORMAL EKG: ICD-10-CM

## 2022-10-18 PROCEDURE — 99214 OFFICE O/P EST MOD 30 MIN: CPT | Performed by: NURSE PRACTITIONER

## 2022-10-18 PROCEDURE — 93000 ELECTROCARDIOGRAM COMPLETE: CPT | Performed by: NURSE PRACTITIONER

## 2022-10-18 PROCEDURE — 94010 BREATHING CAPACITY TEST: CPT | Performed by: NURSE PRACTITIONER

## 2022-10-18 PROCEDURE — 1123F ACP DISCUSS/DSCN MKR DOCD: CPT | Performed by: NURSE PRACTITIONER

## 2022-10-18 ASSESSMENT — PATIENT HEALTH QUESTIONNAIRE - PHQ9
SUM OF ALL RESPONSES TO PHQ QUESTIONS 1-9: 0
SUM OF ALL RESPONSES TO PHQ QUESTIONS 1-9: 0
SUM OF ALL RESPONSES TO PHQ9 QUESTIONS 1 & 2: 0
1. LITTLE INTEREST OR PLEASURE IN DOING THINGS: 0
2. FEELING DOWN, DEPRESSED OR HOPELESS: 0
SUM OF ALL RESPONSES TO PHQ QUESTIONS 1-9: 0
SUM OF ALL RESPONSES TO PHQ QUESTIONS 1-9: 0

## 2022-10-18 NOTE — PROGRESS NOTES
Jamir Sheriff (: 1955) presents today c/o persistent shortness of breath since first ugo COVID in 2021. She had COVID again in 2022. She is fully vaccinated. She does agility with her dog which causes exertional shortness of breath; she also has arranged lawn service as mowing the yard as she can't do that well. She wakes up in the morning \"breathing very shallow\". Denies gasping for air or known snoring. CT calcium score zero in 2021. Normal CXR in 2021. Chief Complaint   Patient presents with    Shortness of Breath      Reviewed and updated this visit by provider:  Tobacco  Allergies  Meds  Problems  Med Hx  Surg Hx  Fam Hx       Immunizations:  Immunization status: up to date and documented.     Review of Systems - Negative except as stated in HPI  History obtained from chart review and the patient  General ROS: negative for - fever  ENT ROS: negative for - nasal congestion or sinus pain  Respiratory ROS: positive for - shortness of breath  negative for - cough, hemoptysis, orthopnea, pleuritic pain, sputum changes, stridor, tachypnea, or wheezing  Cardiovascular ROS: positive for - dyspnea on exertion  negative for - chest pain, edema, irregular heartbeat, loss of consciousness, murmur, orthopnea, palpitations, paroxysmal nocturnal dyspnea, or rapid heart rate  Gastrointestinal ROS: no abdominal pain, change in bowel habits, or black or bloody stools  Genito-Urinary ROS: no dysuria, trouble voiding, or hematuria    Visit Vitals  /78 (Site: Left Upper Arm, Position: Sitting)   Pulse 80   Temp 97.1 °F (36.2 °C) (Temporal)   Ht 5' 4\" (1.626 m)   Wt 164 lb 6.4 oz (74.6 kg)   SpO2 98%   BMI 28.22 kg/m²     Physical Examination: General appearance - alert, well appearing, and in no distress, oriented to person, place, and time, and normal appearing weight  Mental status - alert, oriented to person, place, and time, normal mood, behavior, speech, dress, motor activity, and thought processes, affect appropriate to mood  Neck - supple, no significant adenopathy, thyroid exam: thyroid is normal in size without nodules or tenderness  Chest - clear to auscultation, no wheezes, rales or rhonchi, symmetric air entry  Heart - normal rate, regular rhythm, normal S1, S2, no murmurs, rubs, clicks or gallops  Extremities - peripheral pulses normal, no pedal edema, no clubbing or cyanosis    EKG: NSR; rate 69; non-specific T wave change in comparison to 04/2021. Reviewed by Dr. Adriana Landrum. Assessment/Plan:  1. Shortness of breath    2. CAPPS (dyspnea on exertion)    3. Abnormal EKG      Orders Placed This Encounter   Procedures    KEVIN Maldonado [address]    [address] [facility] Cardiology [facility]     Referral Priority:   Routine     Referral Type:   Eval and Treat     Referral Reason:   Specialty Services Required     Requested Specialty:   Cardiology     Number of Visits Requested:   1    EKG 12 Lead     Order Specific Question:   Reason for Exam?     Answer:   Shortness of Breath    82551 - [place] BREATHING CAPACITY TEST     PFT in office: mild restriction. EKG in office - slightly change to T wave. Scheduled nuclear stress test 10/27/2022. For now, continue current medications. Call or return to clinic prn if these symptoms worsen or fail to improve as anticipated.     Anna Mccarthy NP, APRN - CNP

## 2022-10-31 DIAGNOSIS — R06.09 DOE (DYSPNEA ON EXERTION): Primary | ICD-10-CM

## 2022-10-31 DIAGNOSIS — R94.31 ABNORMAL EKG: ICD-10-CM

## 2022-11-03 LAB — MAMMOGRAPHY, EXTERNAL: NORMAL

## 2022-11-13 NOTE — PROGRESS NOTES
[facility] CARDIOLOGY History & Physical                 Reason for Visit: CAPPS    Subjective:     Patient is a 79 y.o. female who presents as a referral for CAPPS. She had a CT CAC score of 0 in June 2018. The patient had an MPI on October 27 that noted a normal perfusion. The patient reports that she had COVID-19 in February 2021 and has had dyspnea since then. However, she reports that her dyspnea on exertion has improved over time. She reports that she occasionally has dyspnea on exertion walking up a hill or with agility activities with her dog. She denies angina. The patient had an ECG on October 18 that was noted to demonstrate sinus rhythm with nonspecific ST and/or T wave abnormalities. Past Medical History:   Diagnosis Date    Allergic rhinitis, cause unspecified 7/1/2015    Anxiety     BPPV (benign paroxysmal positional vertigo)     Dr. 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    Enthesopathy of ankle and tarsus, unspecified     GERD (gastroesophageal reflux disease)     managed with diet    Hallux valgus (acquired)     History of 2019 novel coronavirus disease (COVID-19) 02/24/2021    History of gestational diabetes     no further complications    History of mammogram 04/06/2021    negative at [address]    History of Nissen fundoplication 2950      Past Surgical History:   Procedure Laterality Date    APPENDECTOMY  1977    BUNIONECTOMY Right     toe    [address]    COLONOSCOPY  04/18/2017    normal colon- repat 2022    FINGER TRIGGER RELEASE Left     LAPAROSCOPIC NISSEN FUNDOPLICATION      ORTHOPEDIC SURGERY Right early 2000's    trigger finger and thumb    ORTHOPEDIC SURGERY Left 1997    ankle repair    UPPER GASTROINTESTINAL ENDOSCOPY  04/18/2017    small gastric polyps less than 5mm- DR. Dalia Corley      Family History Problem Relation Age of Onset    Cancer Maternal Grandfather     Cancer Paternal Grandmother         uterine    Stroke Maternal Grandmother     Cancer Other         Colon    Diabetes Father     Cancer Father         Bone    Cancer Mother       Social History     Tobacco Use    Smoking status: Never    Smokeless tobacco: Never   Substance Use Topics    Alcohol use: Yes     Alcohol/week: 0.0 standard drinks     Comment: occassionally      Allergies   Allergen Reactions    Levofloxacin Other (See Comments)     Pt not able to recall reaction    Lexapro [Escitalopram] Other (See Comments)     Pt not able to recall reaction    Mycophenolate Mofetil Other (See Comments)    Ciprofloxacin      Other reaction(s): Other- (not listed) - Allergy  Vertigo         ROS:  No obvious pertinent positives on review of systems except for what was outlined above.        Objective:       /60   Pulse 88   Ht 5' 4\" (1.626 m)   Wt 163 lb 3.2 oz (74 kg)   BMI 28.01 kg/m²     BP Readings from Last 3 Encounters:   11/15/22 118/60   10/27/22 130/76   10/18/22 122/78       Wt Readings from Last 3 Encounters:   11/15/22 163 lb 3.2 oz (74 kg)   10/27/22 164 lb (74.4 kg)   10/18/22 164 lb 6.4 oz (74.6 kg)       General/Constitutional:   Alert and oriented x 3, no acute distress  HEENT:   normocephalic, atraumatic, moist mucous membranes  Neck:   No JVD or carotid bruits bilaterally  Cardiovascular:   regular rate and rhythm, no rub/gallop appreciated  Pulmonary:   clear to auscultation bilaterally, no respiratory distress  Abdomen:   soft, non-tender, non-distended  Ext:   No sig LE edema bilaterally  Skin:  warm and dry, no obvious rashes seen  Neuro:   no obvious sensory or motor deficits  Psychiatric:   normal mood and affect    Data Review:   No results found for: CHOL  No results found for: TRIG  No results found for: HDL  No results found for: LDLCHOLESTEROL, LDLCALC  No results found for: LABVLDL, VLDL  No results found for: [facility]     Lab Results   Component Value Date/Time     07/28/2020 12:51 PM     12/31/2019 10:28 AM    K 4.5 07/28/2020 12:51 PM    K 3.9 12/31/2019 10:28 AM     07/28/2020 12:51 PM     12/31/2019 10:28 AM    CO2 25 07/28/2020 12:51 PM    CO2 23 12/31/2019 10:28 AM    BUN 11 07/28/2020 12:51 PM    BUN 15 12/31/2019 10:28 AM    CREATININE 0.70 07/28/2020 12:51 PM    CREATININE 0.85 12/31/2019 10:28 AM    GLUCOSE 80 07/28/2020 12:51 PM    GLUCOSE 115 12/31/2019 10:28 AM    CALCIUM 10.0 07/28/2020 12:51 PM    CALCIUM 8.8 12/31/2019 10:28 AM         Lab Results   Component Value Date    ALT 17 07/28/2020    ALT 18 12/31/2019    AST 22 07/28/2020    AST 20 12/31/2019        Assessment/Plan:   1.  CAPPS (dyspnea on exertion)  - MPI on October 27 noted a normal perfusion  - Obtain an echocardiogram    F/U: As needed    Freddy Jacques MD

## 2022-11-15 ENCOUNTER — INITIAL CONSULT (OUTPATIENT)
Dept: CARDIOLOGY CLINIC | Age: 67
End: 2022-11-15
Payer: COMMERCIAL

## 2022-11-15 VITALS
HEART RATE: 88 BPM | DIASTOLIC BLOOD PRESSURE: 60 MMHG | SYSTOLIC BLOOD PRESSURE: 118 MMHG | BODY MASS INDEX: 27.86 KG/M2 | WEIGHT: 163.2 LBS | HEIGHT: 64 IN

## 2022-11-15 DIAGNOSIS — R06.09 DOE (DYSPNEA ON EXERTION): Primary | ICD-10-CM

## 2022-11-15 PROCEDURE — 1123F ACP DISCUSS/DSCN MKR DOCD: CPT | Performed by: INTERNAL MEDICINE

## 2022-11-15 PROCEDURE — 99203 OFFICE O/P NEW LOW 30 MIN: CPT | Performed by: INTERNAL MEDICINE

## 2022-12-01 ENCOUNTER — TELEPHONE (OUTPATIENT)
Dept: CARDIOLOGY CLINIC | Age: 67
End: 2022-12-01

## 2022-12-01 NOTE — TELEPHONE ENCOUNTER
----- Message from Vania Anderson MD sent at 12/1/2022  8:06 AM EST -----  Please let the patient know that the heart function is normal on ECHO. The patient does have mild mitral regurgitation documented on the echocardiogram (personally reviewed). Therefore, this warrants a surveillance echocardiogram in 3 to 5 years. Mild mitral regurgitation is a common finding on echocardiography and does not account for the patient's symptoms. I recommend the future echocardiogram be ordered by the patient's PCP's office at that time, and the patient can follow-up with us if needed if there is progression. A message has been sent to the patient's PCP with the recommendation.

## 2023-01-03 ENCOUNTER — PATIENT MESSAGE (OUTPATIENT)
Dept: CARDIOLOGY CLINIC | Age: 68
End: 2023-01-03

## 2023-01-03 ENCOUNTER — TELEPHONE (OUTPATIENT)
Dept: CARDIOLOGY CLINIC | Age: 68
End: 2023-01-03

## 2023-01-03 NOTE — TELEPHONE ENCOUNTER
----- Message from Susanna Robles, [facility] [place] sent at 1/3/2023 10:32 AM EST -----  Regarding: FW: 2 questions    ----- Message -----  From: Viri Lau  Sent: 1/3/2023  10:24 AM EST  To: [facility] Clinical Staff  Subject: 2 questions                                      -Now that we know I have a mild to moderate mitrsl regurgitation, will adding more cardio activities to my weight-strenghth activites help? I meet with a  Fri to reset my fitness goals.    -As I review my life insurance policies, is this something that might affect changing my term policy to a newer whole life policy? Thank you for your feedback.    Christal Henson

## 2023-01-03 NOTE — TELEPHONE ENCOUNTER
MD Larisa Chapin RN  Caller: Unspecified (Today, 10:36 AM)  Please let her know that her echocardiogram was personally reviewed, and she has no more than mild mitral regurgitation. I sent a message to her PCP to have a surveillance echocardiogram performed in 3 to 5 years. She can follow-up with us if needed if it progresses. This is a common finding and is not the cause for symptoms. As for her physical activity, there is no restriction whatsoever. Though this is clinically unimportant at this time, determining what to document and what to exclude on life insurance policies is out of my purview.

## 2023-02-15 ENCOUNTER — APPOINTMENT (RX ONLY)
Dept: URBAN - METROPOLITAN AREA CLINIC 24 | Facility: CLINIC | Age: 68
Setting detail: DERMATOLOGY
End: 2023-02-15

## 2023-02-15 DIAGNOSIS — D18.0 HEMANGIOMA: ICD-10-CM

## 2023-02-15 DIAGNOSIS — D485 NEOPLASM OF UNCERTAIN BEHAVIOR OF SKIN: ICD-10-CM

## 2023-02-15 DIAGNOSIS — Z71.89 OTHER SPECIFIED COUNSELING: ICD-10-CM

## 2023-02-15 DIAGNOSIS — L82.1 OTHER SEBORRHEIC KERATOSIS: ICD-10-CM

## 2023-02-15 DIAGNOSIS — L57.8 OTHER SKIN CHANGES DUE TO CHRONIC EXPOSURE TO NONIONIZING RADIATION: ICD-10-CM

## 2023-02-15 DIAGNOSIS — Z85.828 PERSONAL HISTORY OF OTHER MALIGNANT NEOPLASM OF SKIN: ICD-10-CM

## 2023-02-15 PROBLEM — D18.01 HEMANGIOMA OF SKIN AND SUBCUTANEOUS TISSUE: Status: ACTIVE | Noted: 2023-02-15

## 2023-02-15 PROBLEM — D48.5 NEOPLASM OF UNCERTAIN BEHAVIOR OF SKIN: Status: ACTIVE | Noted: 2023-02-15

## 2023-02-15 PROCEDURE — ? COUNSELING

## 2023-02-15 PROCEDURE — 11103 TANGNTL BX SKIN EA SEP/ADDL: CPT

## 2023-02-15 PROCEDURE — ? BIOPSY BY SHAVE METHOD

## 2023-02-15 PROCEDURE — 11102 TANGNTL BX SKIN SINGLE LES: CPT

## 2023-02-15 PROCEDURE — 99213 OFFICE O/P EST LOW 20 MIN: CPT | Mod: 25

## 2023-02-15 ASSESSMENT — LOCATION ZONE DERM
LOCATION ZONE: LEG
LOCATION ZONE: ARM
LOCATION ZONE: NECK
LOCATION ZONE: TRUNK
LOCATION ZONE: FACE

## 2023-02-15 ASSESSMENT — LOCATION SIMPLE DESCRIPTION DERM
LOCATION SIMPLE: CHEST
LOCATION SIMPLE: LEFT UPPER BACK
LOCATION SIMPLE: RIGHT FOREARM
LOCATION SIMPLE: RIGHT SHOULDER
LOCATION SIMPLE: POSTERIOR NECK
LOCATION SIMPLE: ABDOMEN
LOCATION SIMPLE: LEFT FOREHEAD
LOCATION SIMPLE: RIGHT LOWER BACK
LOCATION SIMPLE: RIGHT THIGH
LOCATION SIMPLE: LEFT UPPER ARM
LOCATION SIMPLE: LEFT LOWER BACK
LOCATION SIMPLE: RIGHT UPPER BACK
LOCATION SIMPLE: RIGHT CALF

## 2023-02-15 ASSESSMENT — LOCATION DETAILED DESCRIPTION DERM
LOCATION DETAILED: SUBXIPHOID
LOCATION DETAILED: RIGHT SUPERIOR UPPER BACK
LOCATION DETAILED: LEFT ANTERIOR LATERAL DISTAL UPPER ARM
LOCATION DETAILED: RIGHT ANTERIOR PROXIMAL THIGH
LOCATION DETAILED: LEFT INFERIOR MEDIAL MIDBACK
LOCATION DETAILED: LEFT FOREHEAD
LOCATION DETAILED: RIGHT MEDIAL TRAPEZIAL NECK
LOCATION DETAILED: RIGHT SUPERIOR MEDIAL MIDBACK
LOCATION DETAILED: RIGHT PROXIMAL DORSAL FOREARM
LOCATION DETAILED: RIGHT POSTERIOR SHOULDER
LOCATION DETAILED: RIGHT LATERAL SUPERIOR CHEST
LOCATION DETAILED: RIGHT PROXIMAL LATERAL CALF
LOCATION DETAILED: RIGHT LATERAL ABDOMEN
LOCATION DETAILED: LEFT SUPERIOR UPPER BACK
LOCATION DETAILED: RIGHT MEDIAL UPPER BACK
LOCATION DETAILED: UPPER STERNUM

## 2023-02-15 NOTE — PROCEDURE: BIOPSY BY SHAVE METHOD
Detail Level: Detailed
Depth Of Biopsy: dermis
Was A Bandage Applied: Yes
Size Of Lesion In Cm: 0
Biopsy Type: H and E
Biopsy Method: Dermablade
Anesthesia Type: 1% lidocaine with epinephrine
Anesthesia Volume In Cc: 0.5
Hemostasis: Drysol
Wound Care: Petrolatum
Dressing: bandage
Destruction After The Procedure: No
Type Of Destruction Used: Curettage
Curettage Text: The wound bed was treated with curettage after the biopsy was performed.
Cryotherapy Text: The wound bed was treated with cryotherapy after the biopsy was performed.
Electrodesiccation Text: The wound bed was treated with electrodesiccation after the biopsy was performed.
Electrodesiccation And Curettage Text: The wound bed was treated with electrodesiccation and curettage after the biopsy was performed.
Silver Nitrate Text: The wound bed was treated with silver nitrate after the biopsy was performed.
Lab: [id number]
Lab Facility: [id number]
Consent: Written consent was obtained and risks were reviewed including but not limited to scarring, infection, bleeding, scabbing, incomplete removal, nerve damage and allergy to anesthesia.
Post-Care Instructions: I reviewed with the patient in detail post-care instructions. Patient is to keep the biopsy site dry overnight, and then apply bacitracin twice daily until healed. Patient may apply hydrogen peroxide soaks to remove any crusting.
Notification Instructions: Patient will be notified of biopsy results. However, patient instructed to call the office if not contacted within 2 weeks.
Billing Type: Third-Party Bill
Information: Selecting Yes will display possible errors in your note based on the variables you have selected. This validation is only offered as a suggestion for you. PLEASE NOTE THAT THE VALIDATION TEXT WILL BE REMOVED WHEN YOU FINALIZE YOUR NOTE. IF YOU WANT TO FAX A PRELIMINARY NOTE YOU WILL NEED TO TOGGLE THIS TO 'NO' IF YOU DO NOT WANT IT IN YOUR FAXED NOTE.

## 2023-07-21 ENCOUNTER — OFFICE VISIT (OUTPATIENT)
Dept: ORTHOPEDIC SURGERY | Age: 68
End: 2023-07-21
Payer: MEDICARE

## 2023-07-21 DIAGNOSIS — M65.341 TRIGGER FINGER, RIGHT RING FINGER: Primary | ICD-10-CM

## 2023-07-21 PROCEDURE — G8400 PT W/DXA NO RESULTS DOC: HCPCS | Performed by: ORTHOPAEDIC SURGERY

## 2023-07-21 PROCEDURE — G8417 CALC BMI ABV UP PARAM F/U: HCPCS | Performed by: ORTHOPAEDIC SURGERY

## 2023-07-21 PROCEDURE — 1123F ACP DISCUSS/DSCN MKR DOCD: CPT | Performed by: ORTHOPAEDIC SURGERY

## 2023-07-21 PROCEDURE — G8428 CUR MEDS NOT DOCUMENT: HCPCS | Performed by: ORTHOPAEDIC SURGERY

## 2023-07-21 PROCEDURE — 3017F COLORECTAL CA SCREEN DOC REV: CPT | Performed by: ORTHOPAEDIC SURGERY

## 2023-07-21 PROCEDURE — 1036F TOBACCO NON-USER: CPT | Performed by: ORTHOPAEDIC SURGERY

## 2023-07-21 PROCEDURE — 1090F PRES/ABSN URINE INCON ASSESS: CPT | Performed by: ORTHOPAEDIC SURGERY

## 2023-07-21 PROCEDURE — 99214 OFFICE O/P EST MOD 30 MIN: CPT | Performed by: ORTHOPAEDIC SURGERY

## 2023-07-21 NOTE — PROGRESS NOTES
[facility] Note    Name: Collin López  Age: 79 y.o. YOB: 1955  Gender: female  MRN: [id number]      Follow up visit:   1. Trigger finger, right ring finger        HPI: Collin López is a 79 y.o. female who is following up for an unrelated issue, she is status post multiple trigger finger releases, 1 by Dr. Cristiana Stauffer, 1 by Dr. Rodriguez and 1 by me, she has done well with all of those, she has painful clicking and locking of the right ring finger. ROS/Meds/PSH/PMH/FH/SH: I personally reviewed the patients standard intake form. Pertinents are discussed in the HPI    Physical Examination:  General: Awake and alert. HEENT: Normocephalic, atraumatic  CV/Pulm: Breathing even and unlabored  Skin: No obvious rashes noted. Lymphatic: No obvious evidence of lymphedema or lymphadenopathy    Musculoskeletal Examination:  Examination on the right upper extremity demonstrates cap refill < 5 seconds in all fingers, tenderness palpation of the right ring finger A1 pulley with positive locking. Imaging / Electrodiagnostic Tests:     none    Assessment:   1. Trigger finger, right ring finger        Plan:   We discussed the diagnosis and different treatment options. We discussed observation, therapy, antiinflammatory medications and other pertinent treatment modalities. After discussing in detail the patient elects to proceed with right ring finger ultrasound-guided trigger finger release, we discussed all treatment options in detail however, the patient has had multiple injections in the past for all her trigger fingers and they all recur, she has already had 3 trigger finger surgeries in the past so she wishes to proceed with a permanent fix to her problem.     Patient understands risks and benefits of RIGHT ULTRASOUND-GUIDED TRIGGER FINGER RELEASE UNDER WIDE-AWAKE LOCAL ANESTHESIA including but not limited to nerve injury, vessel injury, infection, failure to achieve desired

## 2023-07-21 NOTE — H&P (VIEW-ONLY)
Orthopaedic Hand Clinic Note    Name: Collin López  Age: 79 y.o. YOB: 1955  Gender: female  MRN: [id number]      Follow up visit:   1. Trigger finger, right ring finger        HPI: Collin López is a 79 y.o. female who is following up for an unrelated issue, she is status post multiple trigger finger releases, 1 by Dr. Cristiana Stauffer, 1 by Dr. Uriel Rodriguez and 1 by me, she has done well with all of those, she has painful clicking and locking of the right ring finger. ROS/Meds/PSH/PMH/FH/SH: I personally reviewed the patients standard intake form. Pertinents are discussed in the HPI    Physical Examination:  General: Awake and alert. HEENT: Normocephalic, atraumatic  CV/Pulm: Breathing even and unlabored  Skin: No obvious rashes noted. Lymphatic: No obvious evidence of lymphedema or lymphadenopathy    Musculoskeletal Examination:  Examination on the right upper extremity demonstrates cap refill < 5 seconds in all fingers, tenderness palpation of the right ring finger A1 pulley with positive locking. Imaging / Electrodiagnostic Tests:     none    Assessment:   1. Trigger finger, right ring finger        Plan:   We discussed the diagnosis and different treatment options. We discussed observation, therapy, antiinflammatory medications and other pertinent treatment modalities. After discussing in detail the patient elects to proceed with right ring finger ultrasound-guided trigger finger release, we discussed all treatment options in detail however, the patient has had multiple injections in the past for all her trigger fingers and they all recur, she has already had 3 trigger finger surgeries in the past so she wishes to proceed with a permanent fix to her problem.     Patient understands risks and benefits of RIGHT RING ULTRASOUND-GUIDED TRIGGER FINGER RELEASE UNDER WIDE-AWAKE LOCAL ANESTHESIA including but not limited to nerve injury, vessel injury, infection, failure to achieve

## 2023-07-24 DIAGNOSIS — M65.341 TRIGGER FINGER, RIGHT RING FINGER: Primary | ICD-10-CM

## 2023-08-07 ENCOUNTER — TELEPHONE (OUTPATIENT)
Dept: ORTHOPEDIC SURGERY | Age: 68
End: 2023-08-07

## 2023-08-07 RX ORDER — IBUPROFEN 200 MG
200 TABLET ORAL AS NEEDED
COMMUNITY

## 2023-08-07 NOTE — PERIOP NOTE
Patient verified name and . Order for consent NOT found in EHR at time of PAT visit. Unable to verify case posting against order; surgery verified by patient. Type 1A-TIVA surgery, phone assessment complete. Orders not received. Labs per surgeon: none ordered  Labs per anesthesia protocol: not indicated    Patient answered medical/surgical history questions at their best of ability. All prior to admission medications documented in EPIC. Patient instructed to take the following medications the day of surgery according to anesthesia guidelines with a small sip of water: meclizine if needed, zyrtec,  flonase if needed. substitute  Hold all vitamins and supplements now for surgery and NSAIDS now for  surgery. Prescription meds to hold: none    Patient instructed on the following:    > Arrive at [facility], time of arrival to be called the day before by 1700  > NPO after midnight, unless otherwise indicated, including gum, mints, and ice chips  > Responsible adult must drive patient to the hospital, stay during surgery, and patient will need supervision 24 hours after anesthesia  > Use antibacterial soap in shower the night before surgery and on the morning of surgery  > All piercings must be removed prior to arrival.    > Leave all valuables (money and jewelry) at home but bring insurance card and ID on DOS.   > You may be required to pay a deductible or co-pay on the day of your procedure. You can pre-pay by calling [phone] if your surgery is at the [facility] or [phone] if your surgery is at the [facility]. > Do not wear make-up, nail polish, lotions, cologne, perfumes, powders, or oil on skin. Artificial nails are not permitted. After reviewing the instructions the patient stated that she was told it would be done as local and she could drive herself home.   Explained to the patient it was scheduled as TIVA and she would need a responsible adult to come to the hospital with her, stay

## 2023-08-07 NOTE — TELEPHONE ENCOUNTER
She has a question regarding anesthesia type and her upcoming surgery. She was told it would be local anesthesia and that she can drive herself. The hospital said something completely different. Please call.

## 2023-08-08 DIAGNOSIS — M65.341 TRIGGER FINGER, RIGHT RING FINGER: Primary | ICD-10-CM

## 2023-08-09 RX ORDER — TRAMADOL HYDROCHLORIDE 50 MG/1
50 TABLET ORAL EVERY 6 HOURS PRN
Qty: 20 TABLET | Refills: 0 | Status: SHIPPED | OUTPATIENT
Start: 2023-08-09 | End: 2023-08-14

## 2023-08-09 NOTE — PERIOP NOTE
Preop department called to notify patient of arrival time for scheduled procedure. Instructions given to   - Arrive at [address] [facility]. - Remain NPO after midnight, unless otherwise indicated, including gum, mints, and ice chips. - Have a responsible adult to drive patient to the hospital, stay during surgery, and patient will need supervision 24 hours after anesthesia. - Use antibacterial soap in shower the night before surgery and on the morning of surgery.        Was patient contacted: yes  Voicemail left:   Numbers contacted: [phone]   Arrival time: 0600

## 2023-08-10 ENCOUNTER — HOSPITAL ENCOUNTER (OUTPATIENT)
Age: 68
Setting detail: OUTPATIENT SURGERY
Discharge: HOME | End: 2023-08-10
Attending: ORTHOPAEDIC SURGERY | Admitting: ORTHOPAEDIC SURGERY
Payer: MEDICARE

## 2023-08-10 VITALS
HEIGHT: 64 IN | HEART RATE: 64 BPM | WEIGHT: 163 LBS | DIASTOLIC BLOOD PRESSURE: 61 MMHG | OXYGEN SATURATION: 100 % | SYSTOLIC BLOOD PRESSURE: 133 MMHG | TEMPERATURE: 97.4 F | RESPIRATION RATE: 16 BRPM | BODY MASS INDEX: 27.83 KG/M2

## 2023-08-10 DIAGNOSIS — M65.341 ACQUIRED TRIGGER FINGER OF RIGHT RING FINGER: ICD-10-CM

## 2023-08-10 PROCEDURE — 2500000003 HC RX 250 WO HCPCS: Performed by: ORTHOPAEDIC SURGERY

## 2023-08-10 PROCEDURE — 2720000010 HC SURG SUPPLY STERILE: Performed by: ORTHOPAEDIC SURGERY

## 2023-08-10 PROCEDURE — 2709999900 HC NON-CHARGEABLE SUPPLY: Performed by: ORTHOPAEDIC SURGERY

## 2023-08-10 PROCEDURE — 3600000012 HC SURGERY LEVEL 2 ADDTL 15MIN: Performed by: ORTHOPAEDIC SURGERY

## 2023-08-10 PROCEDURE — 7100000010 HC PHASE II RECOVERY - FIRST 15 MIN: Performed by: ORTHOPAEDIC SURGERY

## 2023-08-10 PROCEDURE — 6360000002 HC RX W HCPCS: Performed by: ORTHOPAEDIC SURGERY

## 2023-08-10 PROCEDURE — 3600000002 HC SURGERY LEVEL 2 BASE: Performed by: ORTHOPAEDIC SURGERY

## 2023-08-10 PROCEDURE — 7100000000 HC PACU RECOVERY - FIRST 15 MIN: Performed by: ORTHOPAEDIC SURGERY

## 2023-08-10 RX ORDER — BUPIVACAINE HYDROCHLORIDE 2.5 MG/ML
INJECTION, SOLUTION EPIDURAL; INFILTRATION; INTRACAUDAL PRN
Status: DISCONTINUED | OUTPATIENT
Start: 2023-08-10 | End: 2023-08-10 | Stop reason: HOSPADM

## 2023-08-10 RX ORDER — LIDOCAINE HYDROCHLORIDE AND EPINEPHRINE 10; 10 MG/ML; UG/ML
20 INJECTION, SOLUTION INFILTRATION; PERINEURAL
Status: DISCONTINUED | OUTPATIENT
Start: 2023-08-10 | End: 2023-08-10 | Stop reason: HOSPADM

## 2023-08-10 RX ORDER — LIDOCAINE HYDROCHLORIDE 10 MG/ML
INJECTION, SOLUTION INFILTRATION; PERINEURAL PRN
Status: DISCONTINUED | OUTPATIENT
Start: 2023-08-10 | End: 2023-08-10 | Stop reason: HOSPADM

## 2023-08-10 ASSESSMENT — PAIN SCALES - GENERAL: PAINLEVEL_OUTOF10: 0

## 2023-08-10 NOTE — INTERVAL H&P NOTE
H&P Update:  Marck Aldana was seen and examined. History and physical has been reviewed. The patient has been examined.  There have been no significant clinical changes since the completion of the originally dated History and Physical.    Chet Smith MD  Orthopaedic Surgery  08/10/23  6:57 AM

## 2023-08-10 NOTE — OP NOTE
Hand Surgery Operative Note      Kirstin Barreto   79 y.o.   female   8/10/2023     Pre-op diagnosis: Right ring Trigger Finger   Post op diagnosis: same      Procedure: Right  ring Trigger Finger Release with Ultrasound Guidance     Surgeon: Nakita Baldwin MD, PhD      Anesthesia: Local    Tourniquet time: * No tourniquets in log *      Procedure indications: Patient with catching and locking of the above mentioned finger(s) which have been recalcitrant to nonoperative measures. After Thorough discussion, the patient decided to proceed with surgical management. We discussed in detail surgical risks including scar, pain, bleeding, infection, anesthetic risks, neurovascular injury, need for further surgery,  weakness, stiffness, risk of death and potential risk of other unforseen complication. Procedure description:      The hand and fingers were prepared and draped in the usual sterile fashion. Using a sterile ultrasound cover and sterile gel, relevant anatomical landmarks were identified, including but not limited to the A1 pulley, finger flexor tendons and sheath, adjacent digital neurovascular bundles, and region of the A2 pulley. A sterile ink marker was used to jasmyn the region of the A1 pulley of the ring as well as the incision site in the region of the palm overlying the metacarpal shaft. The Becky Short was inspected per the 's instructions. A local anesthetic was administered. A 25-gauge needle was used to create a small skin wheel at the incision site using 1-2 ml of a mixture 1% lidocaine plain and 0.25% Bupivacaine plain. Following this, using ultrasound guidance, local anesthesia was delivered into and superficial to the flexor tendon sheath, as well as the region of the A1 pulley. A #15 scalpel blade was used to make a small transverse stab incision in the region of the palmar flexion crease.  The UltraGuideTFR introducer was then passed through the incision,

## 2023-08-10 NOTE — PERIOP NOTE
PACU DISCHARGE NOTE    Patient voiced understanding of discharge instructions. No questions at this time. Vital signs stable, pain well controlled, alert and oriented times three or at baseline, follow up per surgeon, no anesthetic complications.

## 2023-08-23 ENCOUNTER — OFFICE VISIT (OUTPATIENT)
Dept: ORTHOPEDIC SURGERY | Age: 68
End: 2023-08-23

## 2023-08-23 DIAGNOSIS — M65.341 TRIGGER FINGER, RIGHT RING FINGER: Primary | ICD-10-CM

## 2023-08-23 PROCEDURE — 99024 POSTOP FOLLOW-UP VISIT: CPT | Performed by: NURSE PRACTITIONER

## 2023-08-23 NOTE — PROGRESS NOTES
Orthopaedic Hand Surgery Note    Name: Emmanuel Sevilla  YOB: 1955  Gender: female  MRN: [id number]    HPI: Patient is status post FINGER TRIGGER RELEASE right ring ultra sound guided - Right on 8/10/2023. Patient reports pain is improved, no finger locking. No fevers or chills. Physical Examination:  Wound healing well. Good finger and wrist range of motion. Pain is improved from preoperative. Assessment:     ICD-10-CM    1. Trigger finger, right ring finger  M65.341           Status post FINGER TRIGGER RELEASE right ring ultra sound guided - Right on 8/10/2023    Plan:  We discussed the treatment and therapy plan. We instructed the patient on wound care/scar massage and will continue compressive wrapping for 4 weeks as needed for swelling. Patient was instructed on ROM exercises and will monitor progress - if motion is limited over the next 7-10 days we will progress into formal hand therapy. Lifting, pushing, pulling and carrying will be limited to 5 pounds and under for 4 weeks post-operative. Patient will return to clinic as needed.     Anaid Camejo NP  Orthopaedic Surgery  08/23/23  11:24 AM

## 2023-10-11 ENCOUNTER — APPOINTMENT (RX ONLY)
Dept: URBAN - METROPOLITAN AREA CLINIC 24 | Facility: CLINIC | Age: 68
Setting detail: DERMATOLOGY
End: 2023-10-11

## 2023-10-11 DIAGNOSIS — D485 NEOPLASM OF UNCERTAIN BEHAVIOR OF SKIN: ICD-10-CM

## 2023-10-11 DIAGNOSIS — L82.1 OTHER SEBORRHEIC KERATOSIS: ICD-10-CM

## 2023-10-11 PROBLEM — D48.5 NEOPLASM OF UNCERTAIN BEHAVIOR OF SKIN: Status: ACTIVE | Noted: 2023-10-11

## 2023-10-11 PROCEDURE — ? COUNSELING

## 2023-10-11 PROCEDURE — 99212 OFFICE O/P EST SF 10 MIN: CPT

## 2023-10-11 ASSESSMENT — LOCATION DETAILED DESCRIPTION DERM: LOCATION DETAILED: RIGHT PROXIMAL DORSAL FOREARM

## 2023-10-11 ASSESSMENT — LOCATION ZONE DERM: LOCATION ZONE: ARM

## 2023-10-11 ASSESSMENT — LOCATION SIMPLE DESCRIPTION DERM: LOCATION SIMPLE: RIGHT FOREARM

## 2023-10-11 NOTE — HPI: SKIN LESION
What Type Of Note Output Would You Prefer (Optional)?: Standard Output
How Severe Is Your Skin Lesion?: mild
Is This A New Presentation, Or A Follow-Up?: Skin Lesion
How Severe Is Your Skin Lesion?: moderate
Has Your Skin Lesion Been Treated?: not been treated

## 2023-10-31 ENCOUNTER — OFFICE VISIT (OUTPATIENT)
Dept: INTERNAL MEDICINE CLINIC | Facility: CLINIC | Age: 68
End: 2023-10-31
Payer: MEDICARE

## 2023-10-31 VITALS — WEIGHT: 162 LBS | BODY MASS INDEX: 27.81 KG/M2 | DIASTOLIC BLOOD PRESSURE: 68 MMHG | SYSTOLIC BLOOD PRESSURE: 102 MMHG

## 2023-10-31 DIAGNOSIS — R53.83 MALAISE AND FATIGUE: ICD-10-CM

## 2023-10-31 DIAGNOSIS — H72.91 PERFORATION OF RIGHT TYMPANIC MEMBRANE: ICD-10-CM

## 2023-10-31 DIAGNOSIS — R53.81 MALAISE AND FATIGUE: ICD-10-CM

## 2023-10-31 DIAGNOSIS — R42 VERTIGO: Primary | ICD-10-CM

## 2023-10-31 DIAGNOSIS — Z00.00 MEDICARE ANNUAL WELLNESS VISIT, SUBSEQUENT: ICD-10-CM

## 2023-10-31 DIAGNOSIS — R73.9 HYPERGLYCEMIA: ICD-10-CM

## 2023-10-31 DIAGNOSIS — R42 VERTIGO: ICD-10-CM

## 2023-10-31 LAB
ALBUMIN SERPL-MCNC: 3.8 G/DL (ref 3.2–4.6)
ALBUMIN/GLOB SERPL: 0.9 (ref 0.4–1.6)
ALP SERPL-CCNC: 104 U/L (ref 50–136)
ALT SERPL-CCNC: 30 U/L (ref 12–65)
ANION GAP SERPL CALC-SCNC: 8 MMOL/L (ref 2–11)
AST SERPL-CCNC: 24 U/L (ref 15–37)
BASOPHILS # BLD: 0.1 K/UL (ref 0–0.2)
BASOPHILS NFR BLD: 2 % (ref 0–2)
BILIRUB SERPL-MCNC: 0.2 MG/DL (ref 0.2–1.1)
BUN SERPL-MCNC: 13 MG/DL (ref 8–23)
CALCIUM SERPL-MCNC: 9.9 MG/DL (ref 8.3–10.4)
CHLORIDE SERPL-SCNC: 108 MMOL/L (ref 101–110)
CO2 SERPL-SCNC: 27 MMOL/L (ref 21–32)
CREAT SERPL-MCNC: 0.9 MG/DL (ref 0.6–1)
DIFFERENTIAL METHOD BLD: ABNORMAL
EOSINOPHIL # BLD: 0.1 K/UL (ref 0–0.8)
EOSINOPHIL NFR BLD: 3 % (ref 0.5–7.8)
ERYTHROCYTE [DISTWIDTH] IN BLOOD BY AUTOMATED COUNT: 16.9 % (ref 11.9–14.6)
EST. AVERAGE GLUCOSE BLD GHB EST-MCNC: 123 MG/DL
GLOBULIN SER CALC-MCNC: 4.1 G/DL (ref 2.8–4.5)
GLUCOSE SERPL-MCNC: 90 MG/DL (ref 65–100)
HBA1C MFR BLD: 5.9 % (ref 4.8–5.6)
HCT VFR BLD AUTO: 40.1 % (ref 35.8–46.3)
HGB BLD-MCNC: 11.8 G/DL (ref 11.7–15.4)
IMM GRANULOCYTES # BLD AUTO: 0 K/UL (ref 0–0.5)
IMM GRANULOCYTES NFR BLD AUTO: 0 % (ref 0–5)
LYMPHOCYTES # BLD: 1.3 K/UL (ref 0.5–4.6)
LYMPHOCYTES NFR BLD: 33 % (ref 13–44)
MCH RBC QN AUTO: 23.6 PG (ref 26.1–32.9)
MCHC RBC AUTO-ENTMCNC: 29.4 G/DL (ref 31.4–35)
MCV RBC AUTO: 80.2 FL (ref 82–102)
MONOCYTES # BLD: 0.4 K/UL (ref 0.1–1.3)
MONOCYTES NFR BLD: 10 % (ref 4–12)
NEUTS SEG # BLD: 2 K/UL (ref 1.7–8.2)
NEUTS SEG NFR BLD: 52 % (ref 43–78)
NRBC # BLD: 0 K/UL (ref 0–0.2)
PLATELET # BLD AUTO: 280 K/UL (ref 150–450)
PMV BLD AUTO: 13.3 FL (ref 9.4–12.3)
POTASSIUM SERPL-SCNC: 4.1 MMOL/L (ref 3.5–5.1)
PROT SERPL-MCNC: 7.9 G/DL (ref 6.3–8.2)
RBC # BLD AUTO: 5 M/UL (ref 4.05–5.2)
SODIUM SERPL-SCNC: 143 MMOL/L (ref 133–143)
TSH, 3RD GENERATION: 1.43 UIU/ML (ref 0.36–3.74)
WBC # BLD AUTO: 3.8 K/UL (ref 4.3–11.1)

## 2023-10-31 PROCEDURE — 1090F PRES/ABSN URINE INCON ASSESS: CPT | Performed by: NURSE PRACTITIONER

## 2023-10-31 PROCEDURE — 1036F TOBACCO NON-USER: CPT | Performed by: NURSE PRACTITIONER

## 2023-10-31 PROCEDURE — 99213 OFFICE O/P EST LOW 20 MIN: CPT | Performed by: NURSE PRACTITIONER

## 2023-10-31 PROCEDURE — G0439 PPPS, SUBSEQ VISIT: HCPCS | Performed by: NURSE PRACTITIONER

## 2023-10-31 PROCEDURE — 1123F ACP DISCUSS/DSCN MKR DOCD: CPT | Performed by: NURSE PRACTITIONER

## 2023-10-31 PROCEDURE — G8427 DOCREV CUR MEDS BY ELIG CLIN: HCPCS | Performed by: NURSE PRACTITIONER

## 2023-10-31 PROCEDURE — G8484 FLU IMMUNIZE NO ADMIN: HCPCS | Performed by: NURSE PRACTITIONER

## 2023-10-31 PROCEDURE — G8400 PT W/DXA NO RESULTS DOC: HCPCS | Performed by: NURSE PRACTITIONER

## 2023-10-31 PROCEDURE — 3017F COLORECTAL CA SCREEN DOC REV: CPT | Performed by: NURSE PRACTITIONER

## 2023-10-31 PROCEDURE — G8417 CALC BMI ABV UP PARAM F/U: HCPCS | Performed by: NURSE PRACTITIONER

## 2023-10-31 ASSESSMENT — PATIENT HEALTH QUESTIONNAIRE - PHQ9
SUM OF ALL RESPONSES TO PHQ QUESTIONS 1-9: 2
2. FEELING DOWN, DEPRESSED OR HOPELESS: 1
SUM OF ALL RESPONSES TO PHQ QUESTIONS 1-9: 2
SUM OF ALL RESPONSES TO PHQ9 QUESTIONS 1 & 2: 2
1. LITTLE INTEREST OR PLEASURE IN DOING THINGS: 1

## 2023-10-31 ASSESSMENT — LIFESTYLE VARIABLES
HOW OFTEN DO YOU HAVE A DRINK CONTAINING ALCOHOL: 2-4 TIMES A MONTH
HOW MANY STANDARD DRINKS CONTAINING ALCOHOL DO YOU HAVE ON A TYPICAL DAY: 1 OR 2

## 2023-10-31 NOTE — PROGRESS NOTES
Valley Gentleman (: 1955) presents today c/o increased tiredness as of late. She has been having a few more vertigo spells. Yesterday, was her fourth day with mild vertigo. She took 1/2 tablet meclizine late  night (two days ago) and then felt nauseous overnight. She flew to [place] in July and felt pretty bad; meclizine made it worse. She is sleeping well at night. 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 She is scheduled to have this patched in January with Dr. Neha Quinonez (ENT). She does have a hearing aid for the right ear as well, but does not have it in today. Chief Complaint   Patient presents with    Dizziness    Fatigue    Medicare AWV     Patient Active Problem List   Diagnosis    Sensorineural hearing loss, bilateral    Hearing loss of right ear    History of bunionectomy of right great toe    Environmental and seasonal allergies    Esophageal reflux    Golfers elbow of left upper extremity    Left elbow pain    Closed trimalleolar fracture of left ankle    Right shoulder pain    Foot pain, left    Hallux valgus of left foot    Vertigo    Allergic rhinitis    CAPPS (dyspnea on exertion)    Trigger finger, right ring finger      Reviewed and updated this visit by provider:  Tobacco  Allergies  Meds  Problems  Med Hx  Surg Hx  Fam Hx       Immunizations:  Immunization status: declines update today.     Review of Systems - Negative except as stated in HPI  History obtained from chart review and the patient  General ROS: positive for  - fatigue  negative for - fever  ENT ROS: positive for - vertigo and hearing changes  negative for - headaches, nasal congestion, nasal discharge, or sinus pain  Respiratory ROS: no cough, shortness of breath, or wheezing  Cardiovascular ROS: no chest pain or dyspnea on exertion  Neurological ROS: positive for - dizziness  negative for - behavioral changes, bowel and bladder control changes, confusion, gait

## 2023-11-01 DIAGNOSIS — D75.89 MACROCYTIC: ICD-10-CM

## 2023-11-01 DIAGNOSIS — D72.819 LEUKOPENIA, UNSPECIFIED TYPE: Primary | ICD-10-CM

## 2023-11-02 ENCOUNTER — HOSPITAL ENCOUNTER (OUTPATIENT)
Dept: PHYSICAL THERAPY | Age: 68
Setting detail: RECURRING SERIES
Discharge: HOME | End: 2023-11-05
Payer: MEDICARE

## 2023-11-02 DIAGNOSIS — R42 DIZZINESS AND GIDDINESS: ICD-10-CM

## 2023-11-02 DIAGNOSIS — R29.3 ABNORMAL POSTURE: Primary | ICD-10-CM

## 2023-11-02 DIAGNOSIS — R26.81 UNSTEADINESS ON FEET: ICD-10-CM

## 2023-11-02 PROCEDURE — 97110 THERAPEUTIC EXERCISES: CPT

## 2023-11-02 PROCEDURE — 97162 PT EVAL MOD COMPLEX 30 MIN: CPT

## 2023-11-02 ASSESSMENT — PAIN SCALES - GENERAL: PAINLEVEL_OUTOF10: 0

## 2023-11-02 NOTE — THERAPY EVALUATION
Ye Alaniz  : 1955  Primary: Medicare Part A And B (Medicare)  Secondary: [address] @ [address] [facility]  [address]  [address] [facility]  Phone: [phone]  Fax: [phone] Plan Frequency: 1 time per week for up to 90 days    Plan of Care/Certification Expiration Date: 24      PT Visit Info:  Plan Frequency: 1 time per week for up to 90 days  Plan of Care/Certification Expiration Date: 24      Visit Count:  1                OUTPATIENT PHYSICAL THERAPY:             Initial Assessment 2023               Episode (dizziness) Appt Desk         Treatment Diagnosis:    Abnormal posture  Unsteadiness on feet  Dizziness and giddiness  Medical/Referring Diagnosis:      Referring Physician:  MATTI Mensah CNP, MD Orders:  PT Eval and Treat   Return MD Appt:  unknown  Date of Onset:  Onset Date: 10/27/23      Allergies:  Levofloxacin, Lexapro [escitalopram], Mycophenolate mofetil, and Ciprofloxacin  Restrictions/Precautions:    Restrictions/Precautions: None        Medications Last Reviewed:  2023     SUBJECTIVE   History of Injury/Illness (Reason for Referral):  Patient reports vertigo from time to time. It started in . She thinks it had to do with [employer]cept. She had to go to the ER 1 time for it. The last bout started on Friday. It gets worse as the day goes on. She went to [facility] in  and had to spend 1 day in bed because of headache and dizziness. She bout that started on Friday is only mildly bother her now. Stress or hurry makes the symptoms worse. She gets pressure in her head and feels like she is walking drunk. Bending down or turning her head side to side makes her feel dizzy.     Patient Stated Goal(s):  \"manage vertigo, dizziness\"  Initial:     010 Post Session:     0/10  Past Medical History/Comorbidities:   Ms. Dayanna Coon  has a past medical history of Allergic rhinitis, cause

## 2023-11-06 NOTE — PROGRESS NOTES
Anthony Carl  : 1955  Primary: Medicare Part A And B (Medicare)  Secondary: [address] @ [address] [facility]  [address]  [address],  [address] [place] [address]  Phone: [phone]  Fax: [phone] Plan Frequency: 1 time per week for up to 90 days    Plan of Care/Certification Expiration Date: 24      >PT Visit Info:  Plan Frequency: 1 time per week for up to 90 days  Plan of Care/Certification Expiration Date: 24      Visit Count:  1    OUTPATIENT PHYSICAL THERAPY: Treatment Note 2023       Episode  }Appt Desk             Treatment Diagnosis:    Abnormal posture  Unsteadiness on feet  Dizziness and giddiness  Medical/Referring Diagnosis:    Vertigo  Referring Physician:  MATTI Marcus CNP, MD Orders:  PT Eval and Treat   Date of Onset:  Onset Date: 10/27/23     Allergies:   Levofloxacin, Lexapro [escitalopram], Mycophenolate mofetil, and Ciprofloxacin  Restrictions/Precautions:  Restrictions/Precautions: None  No data recorded   Interventions Planned (Treatment may consist of any combination of the following):    Current Treatment Recommendations: Balance training; Functional mobility training; IADL training; Gait training; Neuromuscular re-education; Home exercise program; Vestibular rehab     >Subjective Comments:see eval     >Initial:     0/10>Post Session:       0/10  Medications Last Reviewed:  2023  Updated Objective Findings:  See Evaluation Note from today  Treatment   THERAPEUTIC EXERCISE: (8 minutes):    Exercises per grid below to improve mobility, balance, and coordination. Required moderate visual and verbal cues to promote proper body alignment and promote proper body posture. Progressed complexity of movement as indicated.      Date:  2023   Activity/Exercise Parameters   Smooth pursuit Vertical, horizontal, diagonal   saccades Vertical, horizontal, diagonal                              Treatment/Session

## 2023-11-08 ENCOUNTER — HOSPITAL ENCOUNTER (OUTPATIENT)
Dept: PHYSICAL THERAPY | Age: 68
Setting detail: RECURRING SERIES
Discharge: HOME | End: 2023-11-11
Payer: MEDICARE

## 2023-11-08 PROCEDURE — 97110 THERAPEUTIC EXERCISES: CPT

## 2023-11-08 ASSESSMENT — PAIN SCALES - GENERAL: PAINLEVEL_OUTOF10: 0

## 2023-11-08 NOTE — PROGRESS NOTES
Deann Sepulveda  : 1955  Primary: Medicare Part A And B (Medicare)  Secondary: [address] @ [address] [facility]  [address]  [address],  [address] [place] [address]  Phone: [phone]  Fax: [phone] Plan Frequency: 1 time per week for up to 90 days    Plan of Care/Certification Expiration Date: 24      >PT Visit Info:  Plan Frequency: 1 time per week for up to 90 days  Plan of Care/Certification Expiration Date: 24      Visit Count:  2    OUTPATIENT PHYSICAL THERAPY: Treatment Note 2023       Episode  }Appt Desk             Treatment Diagnosis:    No data found\  Abnormal posture  Unsteadiness on feet  Dizziness and giddiness  Medical/Referring Diagnosis:      Referring Physician:  MATTI Davalos CNP, MD Orders:  PT Eval and Treat   Date of Onset:  Onset Date: 10/27/23     Allergies:   Levofloxacin, Lexapro [escitalopram], Mycophenolate mofetil, and Ciprofloxacin  Restrictions/Precautions:  Restrictions/Precautions: None  No data recorded   Interventions Planned (Treatment may consist of any combination of the following):    Current Treatment Recommendations: Balance training; Functional mobility training; IADL training; Gait training; Neuromuscular re-education; Home exercise program; Vestibular rehab     >Subjective Comments: she reports she can tell she is getting better. Did her exercises some but not twice a day. >Initial:     0/10>Post Session:       0/10  Medications Last Reviewed:  2023  Updated Objective Findings:  None Today  Treatment   THERAPEUTIC EXERCISE: (40 minutes):    Exercises per grid below to improve mobility, balance, and coordination. Required moderate visual and verbal cues to promote proper body alignment and promote proper body posture. Progressed complexity of movement as indicated.      Date:  2023   Activity/Exercise Parameters   Smooth pursuit    saccades Vertical, horizontal, diagonal on blue

## 2023-11-10 ENCOUNTER — RX ONLY (OUTPATIENT)
Age: 68
Setting detail: RX ONLY
End: 2023-11-10

## 2023-11-15 ENCOUNTER — HOSPITAL ENCOUNTER (OUTPATIENT)
Dept: PHYSICAL THERAPY | Age: 68
Setting detail: RECURRING SERIES
Discharge: HOME | End: 2023-11-18
Payer: MEDICARE

## 2023-11-15 PROCEDURE — 97110 THERAPEUTIC EXERCISES: CPT

## 2023-11-15 ASSESSMENT — PAIN SCALES - GENERAL: PAINLEVEL_OUTOF10: 0

## 2023-11-21 RX ORDER — ACETAMINOPHEN 500 MG
500 TABLET ORAL EVERY 6 HOURS PRN
COMMUNITY

## 2023-11-21 NOTE — PERIOP NOTE
Phone pre-assessment completed. Verified name&  . Order to obtain consent not found in EHR, however patient verifies case posting. Type 1B surgery,  assessment complete. Orders not received. Labs per surgeon: unknown  Labs per anesthesia protocol: none      Patient answered medical/surgical history questions at their best of ability. All prior to admission medications documented in EPIC. Patient instructed to take ONLY the following medications the day of surgery according to anesthesia guidelines with a small sip of water: meclizine if needed     If you have never been diagnosed with liver disease, take Acetaminophen 1000mg in the morning and then again before bed one day prior to surgery date. VERBALIZES UNDERSTANDING TO HOLD ALL VITAMINS AND SUPPLEMENTS 7 DAYS PRIOR TO SURGERY DATE (with exception to Renal Vitamins) and NSAIDS (aspirin, naproxen, ibuprofen) 5 DAYS PRIOR TO SURGERY DATE PER ANESTHESIA PROTOCOL. Instructed on the following:    > Arrive at [address] [place] [address] ([address])[facility] [address]   >Time of arrival to be called the day before by 1700  > NPO after midnight including gum, mints, and ice chips  > Responsible adult must drive patient to the hospital, stay during surgery, and patient will need supervision 24 hours after anesthesia  > Use antibacterial soap in shower the night before surgery and on the morning of surgery  > All piercings must be removed prior to arrival.    > Leave all valuables (money and jewelry) at home but bring insurance card and ID on DOS.   > You may be required to pay a deductible or co-pay on the day of your procedure. You can pre-pay by calling [phone] if your surgery is at the [facility] or [phone] if your surgery is at the [facility]. > Do not wear make-up, nail polish, lotions, cologne, perfumes, powders, or oil on skin. Artificial nails are not permitted.    Please bring the following that apply: CPAP or Bi-Pap, inhalers, remote

## 2023-11-22 NOTE — PERIOP NOTE
Preop department called to notify patient of arrival time for scheduled procedure. Instructions given to   - Arrive at [address] [facility]. - Remain NPO after midnight, unless otherwise indicated, including gum, mints, and ice chips. - Have a responsible adult to drive patient to the hospital, stay during surgery, and patient will need supervision 24 hours after anesthesia. - Use antibacterial soap in shower the night before surgery and on the morning of surgery.        Was patient contacted: yes-patient  Voicemail left: No  Numbers contacted: [phone]   Arrival time: 1000

## 2023-11-24 NOTE — PERIOP NOTE
Preop department called to notify patient of arrival time for scheduled procedure. Instructions given to   - Arrive at [address] [facility]. - Remain NPO after midnight, unless otherwise indicated, including gum, mints, and ice chips. - Have a responsible adult to drive patient to the hospital, stay during surgery, and patient will need supervision 24 hours after anesthesia. - Use antibacterial soap in shower the night before surgery and on the morning of surgery. Was patient contacted: NO  Voicemail left: YES  Numbers contacted: [phone]   Arrival time: 3145   Left voicemail with new arrival time.

## 2023-11-27 ENCOUNTER — ANESTHESIA (OUTPATIENT)
Dept: SURGERY | Age: 68
End: 2023-11-27
Payer: MEDICARE

## 2023-11-27 ENCOUNTER — HOSPITAL ENCOUNTER (OUTPATIENT)
Age: 68
Setting detail: OUTPATIENT SURGERY
Discharge: HOME | End: 2023-11-27
Attending: OTOLARYNGOLOGY | Admitting: OTOLARYNGOLOGY
Payer: MEDICARE

## 2023-11-27 ENCOUNTER — ANESTHESIA EVENT (OUTPATIENT)
Dept: SURGERY | Age: 68
End: 2023-11-27
Payer: MEDICARE

## 2023-11-27 VITALS
OXYGEN SATURATION: 95 % | DIASTOLIC BLOOD PRESSURE: 88 MMHG | HEART RATE: 70 BPM | TEMPERATURE: 97.8 F | RESPIRATION RATE: 18 BRPM | BODY MASS INDEX: 27.31 KG/M2 | WEIGHT: 160 LBS | HEIGHT: 64 IN | SYSTOLIC BLOOD PRESSURE: 130 MMHG

## 2023-11-27 LAB — POTASSIUM BLD-SCNC: 3.7 MMOL/L (ref 3.5–5.1)

## 2023-11-27 PROCEDURE — 3700000001 HC ADD 15 MINUTES (ANESTHESIA): Performed by: OTOLARYNGOLOGY

## 2023-11-27 PROCEDURE — 84132 ASSAY OF SERUM POTASSIUM: CPT

## 2023-11-27 PROCEDURE — 2580000003 HC RX 258: Performed by: ANESTHESIOLOGY

## 2023-11-27 PROCEDURE — 2500000003 HC RX 250 WO HCPCS: Performed by: NURSE ANESTHETIST, CERTIFIED REGISTERED

## 2023-11-27 PROCEDURE — 2709999900 HC NON-CHARGEABLE SUPPLY: Performed by: OTOLARYNGOLOGY

## 2023-11-27 PROCEDURE — 3700000000 HC ANESTHESIA ATTENDED CARE: Performed by: OTOLARYNGOLOGY

## 2023-11-27 PROCEDURE — 3600000002 HC SURGERY LEVEL 2 BASE: Performed by: OTOLARYNGOLOGY

## 2023-11-27 PROCEDURE — 3600000012 HC SURGERY LEVEL 2 ADDTL 15MIN: Performed by: OTOLARYNGOLOGY

## 2023-11-27 PROCEDURE — 7100000001 HC PACU RECOVERY - ADDTL 15 MIN: Performed by: OTOLARYNGOLOGY

## 2023-11-27 PROCEDURE — 2500000003 HC RX 250 WO HCPCS: Performed by: OTOLARYNGOLOGY

## 2023-11-27 PROCEDURE — 7100000010 HC PHASE II RECOVERY - FIRST 15 MIN: Performed by: OTOLARYNGOLOGY

## 2023-11-27 PROCEDURE — 6360000002 HC RX W HCPCS: Performed by: NURSE ANESTHETIST, CERTIFIED REGISTERED

## 2023-11-27 PROCEDURE — 7100000000 HC PACU RECOVERY - FIRST 15 MIN: Performed by: OTOLARYNGOLOGY

## 2023-11-27 RX ORDER — MIDAZOLAM HYDROCHLORIDE 2 MG/2ML
2 INJECTION, SOLUTION INTRAMUSCULAR; INTRAVENOUS
Status: DISCONTINUED | OUTPATIENT
Start: 2023-11-27 | End: 2023-11-27 | Stop reason: HOSPADM

## 2023-11-27 RX ORDER — LIDOCAINE HYDROCHLORIDE 10 MG/ML
1 INJECTION, SOLUTION INFILTRATION; PERINEURAL
Status: DISCONTINUED | OUTPATIENT
Start: 2023-11-27 | End: 2023-11-27 | Stop reason: HOSPADM

## 2023-11-27 RX ORDER — OXYCODONE HYDROCHLORIDE 5 MG/1
5 TABLET ORAL PRN
Status: DISCONTINUED | OUTPATIENT
Start: 2023-11-27 | End: 2023-11-27 | Stop reason: HOSPADM

## 2023-11-27 RX ORDER — MIDAZOLAM HYDROCHLORIDE 1 MG/ML
INJECTION INTRAMUSCULAR; INTRAVENOUS PRN
Status: DISCONTINUED | OUTPATIENT
Start: 2023-11-27 | End: 2023-11-27 | Stop reason: SDUPTHER

## 2023-11-27 RX ORDER — PROPOFOL 10 MG/ML
INJECTION, EMULSION INTRAVENOUS PRN
Status: DISCONTINUED | OUTPATIENT
Start: 2023-11-27 | End: 2023-11-27 | Stop reason: SDUPTHER

## 2023-11-27 RX ORDER — ONDANSETRON 2 MG/ML
4 INJECTION INTRAMUSCULAR; INTRAVENOUS
Status: DISCONTINUED | OUTPATIENT
Start: 2023-11-27 | End: 2023-11-27 | Stop reason: HOSPADM

## 2023-11-27 RX ORDER — SODIUM CHLORIDE 9 MG/ML
INJECTION, SOLUTION INTRAVENOUS PRN
Status: CANCELLED | OUTPATIENT
Start: 2023-11-27

## 2023-11-27 RX ORDER — ONDANSETRON 2 MG/ML
INJECTION INTRAMUSCULAR; INTRAVENOUS PRN
Status: DISCONTINUED | OUTPATIENT
Start: 2023-11-27 | End: 2023-11-27 | Stop reason: SDUPTHER

## 2023-11-27 RX ORDER — PROCHLORPERAZINE EDISYLATE 5 MG/ML
5 INJECTION INTRAMUSCULAR; INTRAVENOUS
Status: DISCONTINUED | OUTPATIENT
Start: 2023-11-27 | End: 2023-11-27 | Stop reason: HOSPADM

## 2023-11-27 RX ORDER — LIDOCAINE HYDROCHLORIDE 20 MG/ML
INJECTION, SOLUTION EPIDURAL; INFILTRATION; INTRACAUDAL; PERINEURAL PRN
Status: DISCONTINUED | OUTPATIENT
Start: 2023-11-27 | End: 2023-11-27 | Stop reason: SDUPTHER

## 2023-11-27 RX ORDER — OXYCODONE HYDROCHLORIDE 5 MG/1
10 TABLET ORAL PRN
Status: DISCONTINUED | OUTPATIENT
Start: 2023-11-27 | End: 2023-11-27 | Stop reason: HOSPADM

## 2023-11-27 RX ORDER — ACETAMINOPHEN 500 MG
1000 TABLET ORAL ONCE
Status: DISCONTINUED | OUTPATIENT
Start: 2023-11-27 | End: 2023-11-27 | Stop reason: HOSPADM

## 2023-11-27 RX ORDER — SODIUM CHLORIDE 0.9 % (FLUSH) 0.9 %
5-40 SYRINGE (ML) INJECTION PRN
Status: DISCONTINUED | OUTPATIENT
Start: 2023-11-27 | End: 2023-11-27 | Stop reason: HOSPADM

## 2023-11-27 RX ORDER — DIPHENHYDRAMINE HYDROCHLORIDE 50 MG/ML
12.5 INJECTION INTRAMUSCULAR; INTRAVENOUS
Status: DISCONTINUED | OUTPATIENT
Start: 2023-11-27 | End: 2023-11-27 | Stop reason: HOSPADM

## 2023-11-27 RX ORDER — HYDROMORPHONE HYDROCHLORIDE 2 MG/ML
0.5 INJECTION, SOLUTION INTRAMUSCULAR; INTRAVENOUS; SUBCUTANEOUS EVERY 5 MIN PRN
Status: DISCONTINUED | OUTPATIENT
Start: 2023-11-27 | End: 2023-11-27 | Stop reason: HOSPADM

## 2023-11-27 RX ORDER — SODIUM CHLORIDE, SODIUM LACTATE, POTASSIUM CHLORIDE, CALCIUM CHLORIDE 600; 310; 30; 20 MG/100ML; MG/100ML; MG/100ML; MG/100ML
INJECTION, SOLUTION INTRAVENOUS CONTINUOUS
Status: DISCONTINUED | OUTPATIENT
Start: 2023-11-27 | End: 2023-11-27 | Stop reason: HOSPADM

## 2023-11-27 RX ORDER — SODIUM CHLORIDE 0.9 % (FLUSH) 0.9 %
5-40 SYRINGE (ML) INJECTION EVERY 12 HOURS SCHEDULED
Status: DISCONTINUED | OUTPATIENT
Start: 2023-11-27 | End: 2023-11-27 | Stop reason: HOSPADM

## 2023-11-27 RX ORDER — DEXAMETHASONE SODIUM PHOSPHATE 10 MG/ML
INJECTION INTRAMUSCULAR; INTRAVENOUS PRN
Status: DISCONTINUED | OUTPATIENT
Start: 2023-11-27 | End: 2023-11-27 | Stop reason: SDUPTHER

## 2023-11-27 RX ORDER — HYDROMORPHONE HYDROCHLORIDE 2 MG/ML
0.25 INJECTION, SOLUTION INTRAMUSCULAR; INTRAVENOUS; SUBCUTANEOUS EVERY 5 MIN PRN
Status: DISCONTINUED | OUTPATIENT
Start: 2023-11-27 | End: 2023-11-27 | Stop reason: HOSPADM

## 2023-11-27 RX ORDER — LIDOCAINE HYDROCHLORIDE AND EPINEPHRINE 10; 10 MG/ML; UG/ML
INJECTION, SOLUTION INFILTRATION; PERINEURAL PRN
Status: DISCONTINUED | OUTPATIENT
Start: 2023-11-27 | End: 2023-11-27 | Stop reason: HOSPADM

## 2023-11-27 RX ADMIN — MIDAZOLAM 2 MG: 1 INJECTION INTRAMUSCULAR; INTRAVENOUS at 12:58

## 2023-11-27 RX ADMIN — FENTANYL CITRATE 25 MCG: 50 INJECTION INTRAMUSCULAR; INTRAVENOUS at 13:01

## 2023-11-27 RX ADMIN — FENTANYL CITRATE 25 MCG: 50 INJECTION INTRAMUSCULAR; INTRAVENOUS at 13:17

## 2023-11-27 RX ADMIN — ONDANSETRON 4 MG: 2 INJECTION INTRAMUSCULAR; INTRAVENOUS at 13:08

## 2023-11-27 RX ADMIN — LIDOCAINE HYDROCHLORIDE 2 MG: 20 INJECTION, SOLUTION EPIDURAL; INFILTRATION; INTRACAUDAL; PERINEURAL at 12:57

## 2023-11-27 RX ADMIN — PROPOFOL 190 MG: 10 INJECTION, EMULSION INTRAVENOUS at 13:02

## 2023-11-27 RX ADMIN — DEXAMETHASONE SODIUM PHOSPHATE 10 MG: 10 INJECTION INTRAMUSCULAR; INTRAVENOUS at 13:08

## 2023-11-27 RX ADMIN — LIDOCAINE HYDROCHLORIDE 100 MG: 20 INJECTION, SOLUTION EPIDURAL; INFILTRATION; INTRACAUDAL; PERINEURAL at 13:02

## 2023-11-27 RX ADMIN — SODIUM CHLORIDE, SODIUM LACTATE, POTASSIUM CHLORIDE, AND CALCIUM CHLORIDE: 600; 310; 30; 20 INJECTION, SOLUTION INTRAVENOUS at 12:53

## 2023-11-27 NOTE — DISCHARGE INSTRUCTIONS
Keep ear dry. Call Dr. Jolene Kc with any questions. ACTIVITY  As tolerated and as directed by your doctor. Bathe or shower as directed by your doctor. DIET  Clear liquids until no nausea or vomiting; then light diet for the first day. Advance to regular diet on second day, unless your doctor orders otherwise. If nausea and vomiting continues, call your doctor. PAIN  Take pain medication as directed by your doctor. Call your doctor if pain is NOT relieved by medication. DO NOT take aspirin of blood thinners unless directed by your doctor. DRESSING CARE       CALL YOUR DOCTOR IF   Excessive bleeding that does not stop after holding pressure over the area  Temperature of 101 degrees F or above  Excessive redness, swelling or bruising, and/ or green or yellow, smelly discharge from incision    AFTER ANESTHESIA   For the first 24 hours: DO NOT Drive, Drink alcoholic beverages, or Make important decisions. Be aware of dizziness following anesthesia and while taking pain medication. APPOINTMENT DATE/ TIME    YOUR DOCTOR'S PHONE NUMBER       DISCHARGE SUMMARY from Nurse    PATIENT INSTRUCTIONS:    After general anesthesia or intravenous sedation, for 24 hours or while taking prescription Narcotics:  Limit your activities  Do not drive and operate hazardous machinery  Do not make important personal or business decisions  Do  not drink alcoholic beverages  If you have not urinated within 8 hours after discharge, please contact your surgeon on call. *  Please give a list of your current medications to your Primary Care Provider. *  Please update this list whenever your medications are discontinued, doses are      changed, or new medications (including over-the-counter products) are added. *  Please carry medication information at all times in case of emergency situations.       These are general instructions for a healthy lifestyle:    No smoking/ No tobacco products/ Avoid exposure to

## 2023-11-27 NOTE — ANESTHESIA POSTPROCEDURE EVALUATION
Department of Anesthesiology  Postprocedure Note    Patient: Bill Finn  MRN: [id number]  YOB: 1955  Date of evaluation: 11/27/2023      Procedure Summary     Date: 11/27/23 Room / Location: [facility] OP OR 04 / [facility]    Anesthesia Start: 1252 Anesthesia Stop: 6169    Procedure: RIGHT FAT MYRINGOPLASTY (Right: Ear) Diagnosis:       Mixed conductive and sensorineural hearing loss of right ear with restricted hearing of left ear      Central perforation of tympanic membrane, unspecified laterality      (Mixed conductive and sensorineural hearing loss of right ear with restricted hearing of left ear [H90.A31])      (Central perforation of tympanic membrane, unspecified laterality [H72.00])    Surgeons: Daiana Recio DO Responsible Provider: Emmaline Apley, MD    Anesthesia Type: general ASA Status: 2          Anesthesia Type: No value filed.     Cathy Phase I: Cathy Score: 4    Cathy Phase II: Cahty Score: 10      Anesthesia Post Evaluation    Patient location during evaluation: PACU  Patient participation: complete - patient participated  Level of consciousness: awake and awake and alert  Airway patency: patent  Nausea & Vomiting: no nausea  Complications: no  Cardiovascular status: hemodynamically stable  Respiratory status: acceptable  Hydration status: euvolemic  Multimodal analgesia pain management approach  Pain management: adequate

## 2023-11-27 NOTE — ANESTHESIA PRE PROCEDURE
Plan      general     ASA 2       Induction: intravenous. MIPS: Postoperative opioids intended and Prophylactic antiemetics administered. Anesthetic plan and risks discussed with patient.                         Luba Ruffin MD   11/27/2023

## 2023-11-27 NOTE — OP NOTE
small stab incision in the posterior earlobe and then I was able to use a pair of scissors and a forceps to remove a piece of fat that was sufficient in size to completely block the hole. Once that was done, I did use two 5-0 fast-absorbing sutures to close the surgical incision in interrupted fashion and then I was able to take the fat and I was able to dumbbell this through the tympanic membrane perforation, completely filling the perforation. Then, the patient was awakened and she was taken to the postop recovery room in a stable condition.       DO AMANDA Martines/[email or web address]  D:  11/27/2023 13:31  T:  11/27/2023 14:34  JOB #:  [id number]

## 2023-11-30 ENCOUNTER — HOSPITAL ENCOUNTER (OUTPATIENT)
Dept: PHYSICAL THERAPY | Age: 68
Setting detail: RECURRING SERIES
End: 2023-11-30
Payer: MEDICARE

## 2023-12-01 ENCOUNTER — RX ONLY (OUTPATIENT)
Age: 68
Setting detail: RX ONLY
End: 2023-12-01

## 2023-12-01 NOTE — PROGRESS NOTES
Terrence José  : 1955  Primary: Medicare Part A And B  Secondary: [address] @ [address] [facility]  [address]  [address] [facility]  Phone: [phone]  Fax: [phone]    PT Visit Info:    No data recorded   OT Visit Info:  No data recorded    OUTPATIENT THERAPY: 2023  Episode  Appt Desk        Terrence José cancelled her appointment for today due to unknown reasons. Will plan to follow up next during next appointment.   Thank you,  Chandler Lewis, PT    Future Appointments   Date Time Provider [address] [place] [address]   2023  8:00 AM Jewel Painter, PT [facility]

## 2023-12-05 ENCOUNTER — HOSPITAL ENCOUNTER (OUTPATIENT)
Dept: PHYSICAL THERAPY | Age: 68
Setting detail: RECURRING SERIES
Discharge: HOME | End: 2023-12-08
Payer: MEDICARE

## 2023-12-05 PROCEDURE — 97110 THERAPEUTIC EXERCISES: CPT

## 2023-12-05 NOTE — PROGRESS NOTES
Marck Aldana  : 1955  Primary: Medicare Part A And B (Medicare)  Secondary: [address] @ [address] [facility]  [address]  [address],  [address] [place] [address]  Phone: [phone]  Fax: [phone] Plan Frequency: 1 time per week for up to 90 days    Plan of Care/Certification Expiration Date: 24      >PT Visit Info:  Plan Frequency: 1 time per week for up to 90 days  Plan of Care/Certification Expiration Date: 24      Visit Count:  4    OUTPATIENT PHYSICAL THERAPY: Treatment Note 2023       Episode  }Appt Desk             Treatment Diagnosis:    No data found\  Abnormal posture  Unsteadiness on feet  Dizziness and giddiness  Medical/Referring Diagnosis:      Referring Physician:  MATTI Martinez CNP, MD Orders:  PT Eval and Treat   Date of Onset:  Onset Date: 10/27/23     Allergies:   Levofloxacin, Lexapro [escitalopram], Mycophenolate mofetil, and Ciprofloxacin  Restrictions/Precautions:  Restrictions/Precautions: None  No data recorded   Interventions Planned (Treatment may consist of any combination of the following):    Current Treatment Recommendations: Balance training; Functional mobility training; IADL training; Gait training; Neuromuscular re-education; Home exercise program; Vestibular rehab     >Subjective Comments: patient reports she had the hole in her ear repaired and so it has given her a new sense of vertigo but mild. She has a lot of difficulty doing the forward bend exercise. >Initial:      /10>Post Session:        /10  Medications Last Reviewed:  2023  Updated Objective Findings:  None Today  Treatment   THERAPEUTIC EXERCISE: (40 minutes):    Exercises per grid below to improve mobility, balance, and coordination. Required moderate visual and verbal cues to promote proper body alignment and promote proper body posture. Progressed complexity of movement as indicated.      Date:  2023   Activity/Exercise

## 2023-12-26 ENCOUNTER — OFFICE VISIT (OUTPATIENT)
Dept: INTERNAL MEDICINE CLINIC | Facility: CLINIC | Age: 68
End: 2023-12-26
Payer: MEDICARE

## 2023-12-26 VITALS
BODY MASS INDEX: 26.7 KG/M2 | DIASTOLIC BLOOD PRESSURE: 60 MMHG | OXYGEN SATURATION: 99 % | WEIGHT: 156.4 LBS | HEIGHT: 64 IN | HEART RATE: 86 BPM | SYSTOLIC BLOOD PRESSURE: 104 MMHG

## 2023-12-26 DIAGNOSIS — Z12.11 SCREEN FOR COLON CANCER: ICD-10-CM

## 2023-12-26 DIAGNOSIS — M54.31 RIGHT SIDED SCIATICA: Primary | ICD-10-CM

## 2023-12-26 DIAGNOSIS — G89.29 CHRONIC PAIN OF LEFT ANKLE: ICD-10-CM

## 2023-12-26 DIAGNOSIS — D75.89 MACROCYTIC: ICD-10-CM

## 2023-12-26 DIAGNOSIS — D72.819 LEUKOPENIA, UNSPECIFIED TYPE: ICD-10-CM

## 2023-12-26 DIAGNOSIS — M25.572 CHRONIC PAIN OF LEFT ANKLE: ICD-10-CM

## 2023-12-26 LAB
BASOPHILS # BLD: 0.1 K/UL (ref 0–0.2)
BASOPHILS NFR BLD: 1 % (ref 0–2)
DIFFERENTIAL METHOD BLD: ABNORMAL
EOSINOPHIL # BLD: 0.2 K/UL (ref 0–0.8)
EOSINOPHIL NFR BLD: 4 % (ref 0.5–7.8)
ERYTHROCYTE [DISTWIDTH] IN BLOOD BY AUTOMATED COUNT: 21.3 % (ref 11.9–14.6)
HCT VFR BLD AUTO: 40.6 % (ref 35.8–46.3)
HGB BLD-MCNC: 12.2 G/DL (ref 11.7–15.4)
IMM GRANULOCYTES # BLD AUTO: 0 K/UL (ref 0–0.5)
IMM GRANULOCYTES NFR BLD AUTO: 0 % (ref 0–5)
LYMPHOCYTES # BLD: 1.3 K/UL (ref 0.5–4.6)
LYMPHOCYTES NFR BLD: 25 % (ref 13–44)
MCH RBC QN AUTO: 25.9 PG (ref 26.1–32.9)
MCHC RBC AUTO-ENTMCNC: 30 G/DL (ref 31.4–35)
MCV RBC AUTO: 86.2 FL (ref 82–102)
MONOCYTES # BLD: 0.4 K/UL (ref 0.1–1.3)
MONOCYTES NFR BLD: 8 % (ref 4–12)
NEUTS SEG # BLD: 3.1 K/UL (ref 1.7–8.2)
NEUTS SEG NFR BLD: 62 % (ref 43–78)
NRBC # BLD: 0 K/UL (ref 0–0.2)
PLATELET # BLD AUTO: 226 K/UL (ref 150–450)
PMV BLD AUTO: 12.3 FL (ref 9.4–12.3)
RBC # BLD AUTO: 4.71 M/UL (ref 4.05–5.2)
WBC # BLD AUTO: 5 K/UL (ref 4.3–11.1)

## 2023-12-26 PROCEDURE — G8417 CALC BMI ABV UP PARAM F/U: HCPCS | Performed by: NURSE PRACTITIONER

## 2023-12-26 PROCEDURE — 3017F COLORECTAL CA SCREEN DOC REV: CPT | Performed by: NURSE PRACTITIONER

## 2023-12-26 PROCEDURE — 1036F TOBACCO NON-USER: CPT | Performed by: NURSE PRACTITIONER

## 2023-12-26 PROCEDURE — 1123F ACP DISCUSS/DSCN MKR DOCD: CPT | Performed by: NURSE PRACTITIONER

## 2023-12-26 PROCEDURE — 1090F PRES/ABSN URINE INCON ASSESS: CPT | Performed by: NURSE PRACTITIONER

## 2023-12-26 PROCEDURE — G8484 FLU IMMUNIZE NO ADMIN: HCPCS | Performed by: NURSE PRACTITIONER

## 2023-12-26 PROCEDURE — 99213 OFFICE O/P EST LOW 20 MIN: CPT | Performed by: NURSE PRACTITIONER

## 2023-12-26 PROCEDURE — G8400 PT W/DXA NO RESULTS DOC: HCPCS | Performed by: NURSE PRACTITIONER

## 2023-12-26 PROCEDURE — G8427 DOCREV CUR MEDS BY ELIG CLIN: HCPCS | Performed by: NURSE PRACTITIONER

## 2023-12-26 RX ORDER — FERROUS SULFATE 325(65) MG
325 TABLET ORAL SEE ADMIN INSTRUCTIONS
COMMUNITY

## 2023-12-26 NOTE — PROGRESS NOTES
Refill:  0     Diclofenac as prescribed  - directions for use and side effects discussed. Sciatic and back exercises recommended. May need PT evaluation. Referral to Dr. Keyonna Guillory for chronic left ankle pain; she has plate implant as well. Overdue for screening colonoscopy; follow up with Dr. Jenise Muhammad to repeat - order placed. Follow up as scheduled and PRN.     Ashley Mclean, MATTI - CNP

## 2023-12-28 ENCOUNTER — OFFICE VISIT (OUTPATIENT)
Dept: ORTHOPEDIC SURGERY | Age: 68
End: 2023-12-28
Payer: MEDICARE

## 2023-12-28 DIAGNOSIS — G89.29 CHRONIC PAIN OF LEFT ANKLE: Primary | ICD-10-CM

## 2023-12-28 DIAGNOSIS — M19.172 POST-TRAUMATIC OSTEOARTHRITIS OF LEFT ANKLE: ICD-10-CM

## 2023-12-28 DIAGNOSIS — M25.572 CHRONIC PAIN OF LEFT ANKLE: Primary | ICD-10-CM

## 2023-12-28 LAB — MAMMOGRAPHY, EXTERNAL: NORMAL

## 2023-12-28 PROCEDURE — 1090F PRES/ABSN URINE INCON ASSESS: CPT | Performed by: ORTHOPAEDIC SURGERY

## 2023-12-28 PROCEDURE — G8400 PT W/DXA NO RESULTS DOC: HCPCS | Performed by: ORTHOPAEDIC SURGERY

## 2023-12-28 PROCEDURE — G8428 CUR MEDS NOT DOCUMENT: HCPCS | Performed by: ORTHOPAEDIC SURGERY

## 2023-12-28 PROCEDURE — G8484 FLU IMMUNIZE NO ADMIN: HCPCS | Performed by: ORTHOPAEDIC SURGERY

## 2023-12-28 PROCEDURE — G8417 CALC BMI ABV UP PARAM F/U: HCPCS | Performed by: ORTHOPAEDIC SURGERY

## 2023-12-28 PROCEDURE — 1036F TOBACCO NON-USER: CPT | Performed by: ORTHOPAEDIC SURGERY

## 2023-12-28 PROCEDURE — 99214 OFFICE O/P EST MOD 30 MIN: CPT | Performed by: ORTHOPAEDIC SURGERY

## 2023-12-28 PROCEDURE — 1123F ACP DISCUSS/DSCN MKR DOCD: CPT | Performed by: ORTHOPAEDIC SURGERY

## 2023-12-28 PROCEDURE — 3017F COLORECTAL CA SCREEN DOC REV: CPT | Performed by: ORTHOPAEDIC SURGERY

## 2023-12-28 NOTE — PROGRESS NOTES
to films from 2022.  2022 only had foot films but no dramatic change    I believe her problem relates more to her hindfoot/lateral ankle than central or medial ankle  I do not feel her pain relates to her hardware  At this time she is doing well with no pain so no intervention needed  Future considerations: Bracing: Injection: Potential surgery    Advanced medical imaging: No indication for MRI scan or CT scan  DME: No indication for bracing  We discussed care and protection  PT: No indication  Orthotic/prosthetic: She has custom insoles from [employer]: Kenton Rodriguez is no longer visits: Future insoles from [facility]       Medication - OTC meds prn: Prescribed:  Before taking any pill or using topical medication, I recommend she review all listed medication potential side effects via the Internet to ensure patient safety and to understand and accept risks/complications of reported potential concerns. I also recommend the patient review any concerns over medication sensitivities, reactions or medication compatibility with  [facility] or internal medicine provider    Boswellia, Devil's claw, Turmeric-curcumin   Magne sports topical rub with frankincense and myrrh   CBD or any other natural neuropathic medication  Natural anti-cramping medication      Surgical discussion:   Discussed potential hardware removal ankle and hindfoot surgery but no indication today  2022: She discussed bunion surgery with Dr. Declan Delatorre but at this point stable without surgery  Follow up: As needed  Work status: Retired     This note was created using Dragon voice recognition software which may result in errors of speech and spelling recognition and word/phrase syntax errors.

## 2024-01-02 ENCOUNTER — HOSPITAL ENCOUNTER (OUTPATIENT)
Dept: PHYSICAL THERAPY | Age: 69
Setting detail: RECURRING SERIES
End: 2024-01-02
Payer: MEDICARE

## 2024-01-02 NOTE — PROGRESS NOTES
Abby Goodson  : 1955  Primary: Medicare Part A And B  [facility] [address] [facility] [place] [address]  Phone: [phone]  Fax: [phone]    PT Visit Info:    No data recorded   OT Visit Info:  No data recorded    OUTPATIENT THERAPY: 2024  Episode  Appt Desk        Abby Goodson cancelled her appointment for today due to unknown reasons.  Will plan to follow up next during next appointment.  Thank you,  Kerry Meyers, PT    Future Appointments   Date Time Provider Department Center   2024  9:30 AM Kerry Meyers, PT [facility]   2024 11:45 AM Kerry Meyers PT [facility]         Detail Level: None

## 2024-01-04 ENCOUNTER — HOSPITAL ENCOUNTER (OUTPATIENT)
Dept: PHYSICAL THERAPY | Age: 69
Setting detail: RECURRING SERIES
Discharge: HOME | End: 2024-01-07
Payer: MEDICARE

## 2024-01-04 PROCEDURE — 97110 THERAPEUTIC EXERCISES: CPT

## 2024-01-04 ASSESSMENT — PAIN SCALES - GENERAL: PAINLEVEL_OUTOF10: 0

## 2024-01-04 NOTE — PROGRESS NOTES
Abby Chambers Kellee  : 1955  Primary: Medicare Part A And B (Medicare)  [facility] [place]  [address] [place] [address]  Phone: [phone]  Fax: [phone] Plan Frequency: 1 time per week for up to 90 days    Plan of Care/Certification Expiration Date: 24      >PT Visit Info:  Plan Frequency: 1 time per week for up to 90 days  Plan of Care/Certification Expiration Date: 24      Visit Count:  6    OUTPATIENT PHYSICAL THERAPY: Treatment Note 2024       Episode  }Appt Desk             Treatment Diagnosis:    No data found  Abnormal posture  Unsteadiness on feet  Dizziness and giddiness  Medical/Referring Diagnosis:      Referring Physician:  Paduano, Julia Schmidt, APRN - CNP MD Orders:  PT Eval and Treat   Date of Onset:  Onset Date: 10/27/23     Allergies:   Levofloxacin, Lexapro [escitalopram], Mycophenolate mofetil, and Ciprofloxacin  Restrictions/Precautions:  Restrictions/Precautions: None  No data recorded   Interventions Planned (Treatment may consist of any combination of the following):    Current Treatment Recommendations: Balance training; Functional mobility training; IADL training; Gait training; Neuromuscular re-education; Home exercise program; Vestibular rehab     >Subjective Comments: patient reports she is doing okay and feels like she just needs to continue her HEP at this point.  >Initial:     0/10>Post Session:       0/10  Medications Last Reviewed:  2024  Updated Objective Findings:  See Discharge Note from today  Treatment   THERAPEUTIC EXERCISE: (30 minutes):    Exercises per grid below to improve mobility, balance, and coordination.  Required moderate visual and verbal cues to promote proper body alignment and promote proper body posture.  Progressed complexity of movement as indicated.     Date:  2024   Activity/Exercise Parameters   Smooth pursuit    saccades    Standing head turns

## 2024-01-04 NOTE — THERAPY DISCHARGE
Abby Goodson  : 1955  Primary: Medicare Part A And B (Medicare)  Secondary: NIRAJ GRAY [facility] @ [place] [address] [place] [address]  Phone: [phone]  Fax: [phone] Plan Frequency: 1 time per week for up to 90 days    Plan of Care/Certification Expiration Date: 24      PT Visit Info:  Plan Frequency: 1 time per week for up to 90 days  Plan of Care/Certification Expiration Date: 24      Visit Count:  6                OUTPATIENT PHYSICAL THERAPY:             Discharge Summary 2024               Episode (dizziness) Appt Desk         Treatment Diagnosis:    No data found  Abnormal posture  Unsteadiness on feet  Dizziness and giddiness    Medical/Referring Diagnosis:      Referring Physician:  Paduano, Julia Schmidt, APRN - CNP MD Orders:  PT Eval and Treat   Return MD Appt:  unknown  Date of Onset:  Onset Date: 10/27/23      Allergies:  Levofloxacin, Lexapro [escitalopram], Mycophenolate mofetil, and Ciprofloxacin  Restrictions/Precautions:    Restrictions/Precautions: None        Medications Last Reviewed:  2024               OBJECTIVE   Observation/Orthostatic Postural Assessment:          forward head, rounded shoulders   Mental Status:          WNL   Palpation:          normal muscle tone  Sensation:         Light touch: grossly intact; Proprioception: intact  Skin Integrity:          grossly intact  Vision:          glasses  Balance:          decreased dynamic balance     Lower Extremity: grossly 4+/5     Right MMT Left MMT Right ROM Left ROM          Hip Flexion       Hip Extension       Hip Abduction       Hip Adduction       Knee Flexion       Knee Extension       Dorsiflexion       Plantarflexion       Inversion       Eversion           Postural Control & Balance:  Romber seconds  Single leg balance: 10 seconds R, 10 seconds L      Oculomotor Exam:  Eye Range of Motion:  full range of motion  Cervical

## 2024-01-16 ENCOUNTER — RX ONLY (OUTPATIENT)
Age: 69
Setting detail: RX ONLY
End: 2024-01-16

## 2024-02-02 ENCOUNTER — RX ONLY (OUTPATIENT)
Age: 69
Setting detail: RX ONLY
End: 2024-02-02

## 2024-02-14 ENCOUNTER — RX ONLY (OUTPATIENT)
Age: 69
Setting detail: RX ONLY
End: 2024-02-14

## 2024-02-15 ENCOUNTER — RX ONLY (OUTPATIENT)
Age: 69
Setting detail: RX ONLY
End: 2024-02-15

## 2024-02-19 ENCOUNTER — APPOINTMENT (RX ONLY)
Dept: URBAN - METROPOLITAN AREA CLINIC 24 | Facility: CLINIC | Age: 69
Setting detail: DERMATOLOGY
End: 2024-02-19

## 2024-02-19 DIAGNOSIS — Z71.89 OTHER SPECIFIED COUNSELING: ICD-10-CM

## 2024-02-19 DIAGNOSIS — D18.0 HEMANGIOMA: ICD-10-CM

## 2024-02-19 DIAGNOSIS — L57.0 ACTINIC KERATOSIS: ICD-10-CM

## 2024-02-19 DIAGNOSIS — Z85.828 PERSONAL HISTORY OF OTHER MALIGNANT NEOPLASM OF SKIN: ICD-10-CM

## 2024-02-19 DIAGNOSIS — L57.8 OTHER SKIN CHANGES DUE TO CHRONIC EXPOSURE TO NONIONIZING RADIATION: ICD-10-CM

## 2024-02-19 PROBLEM — D18.01 HEMANGIOMA OF SKIN AND SUBCUTANEOUS TISSUE: Status: ACTIVE | Noted: 2024-02-19

## 2024-02-19 PROCEDURE — ? LIQUID NITROGEN

## 2024-02-19 PROCEDURE — 17000 DESTRUCT PREMALG LESION: CPT

## 2024-02-19 PROCEDURE — ? COUNSELING

## 2024-02-19 PROCEDURE — 99213 OFFICE O/P EST LOW 20 MIN: CPT | Mod: 25

## 2024-02-19 ASSESSMENT — LOCATION DETAILED DESCRIPTION DERM
LOCATION DETAILED: RIGHT MEDIAL TRAPEZIAL NECK
LOCATION DETAILED: RIGHT MEDIAL UPPER BACK
LOCATION DETAILED: RIGHT SUPERIOR UPPER BACK
LOCATION DETAILED: RIGHT LATERAL SUPERIOR CHEST
LOCATION DETAILED: RIGHT ANTERIOR PROXIMAL THIGH
LOCATION DETAILED: RIGHT POSTERIOR SHOULDER
LOCATION DETAILED: LEFT INFERIOR MEDIAL MIDBACK
LOCATION DETAILED: RIGHT LATERAL ABDOMEN
LOCATION DETAILED: LEFT FOREHEAD
LOCATION DETAILED: LEFT LATERAL EYEBROW

## 2024-02-19 ASSESSMENT — LOCATION SIMPLE DESCRIPTION DERM
LOCATION SIMPLE: LEFT EYEBROW
LOCATION SIMPLE: CHEST
LOCATION SIMPLE: RIGHT SHOULDER
LOCATION SIMPLE: LEFT LOWER BACK
LOCATION SIMPLE: LEFT FOREHEAD
LOCATION SIMPLE: ABDOMEN
LOCATION SIMPLE: POSTERIOR NECK
LOCATION SIMPLE: RIGHT THIGH
LOCATION SIMPLE: RIGHT UPPER BACK

## 2024-02-19 ASSESSMENT — LOCATION ZONE DERM
LOCATION ZONE: TRUNK
LOCATION ZONE: ARM
LOCATION ZONE: NECK
LOCATION ZONE: FACE
LOCATION ZONE: LEG

## 2024-02-19 NOTE — PROCEDURE: LIQUID NITROGEN
Show Aperture Variable?: Yes
Consent: The patient's consent was obtained including but not limited to risks of crusting, scabbing, blistering, scarring, darker or lighter pigmentary change, recurrence, incomplete removal and infection.
Detail Level: Detailed
Duration Of Freeze Thaw-Cycle (Seconds): 4
Render Note In Bullet Format When Appropriate: No
Number Of Freeze-Thaw Cycles: 2 freeze-thaw cycles
Post-Care Instructions: I reviewed with the patient in detail post-care instructions. Patient is to wear sunprotection, and avoid picking at any of the treated lesions. Pt may apply Vaseline to crusted or scabbing areas.

## 2024-07-03 ENCOUNTER — APPOINTMENT (RX ONLY)
Dept: URBAN - METROPOLITAN AREA CLINIC 24 | Facility: CLINIC | Age: 69
Setting detail: DERMATOLOGY
End: 2024-07-03

## 2024-07-03 DIAGNOSIS — L57.0 ACTINIC KERATOSIS: ICD-10-CM

## 2024-07-03 DIAGNOSIS — Z00.00 ENCOUNTER FOR GENERAL ADULT MEDICAL EXAMINATION WITHOUT ABNORMAL FINDINGS: ICD-10-CM

## 2024-07-03 PROCEDURE — ? LIQUID NITROGEN

## 2024-07-03 PROCEDURE — 99212 OFFICE O/P EST SF 10 MIN: CPT | Mod: 25

## 2024-07-03 PROCEDURE — 17000 DESTRUCT PREMALG LESION: CPT

## 2024-07-03 PROCEDURE — ? COUNSELING

## 2024-07-03 ASSESSMENT — LOCATION ZONE DERM
LOCATION ZONE: TRUNK
LOCATION ZONE: ARM

## 2024-07-03 ASSESSMENT — LOCATION SIMPLE DESCRIPTION DERM
LOCATION SIMPLE: RIGHT FOREARM
LOCATION SIMPLE: CHEST

## 2024-07-03 ASSESSMENT — LOCATION DETAILED DESCRIPTION DERM
LOCATION DETAILED: RIGHT VENTRAL PROXIMAL FOREARM
LOCATION DETAILED: MIDDLE STERNUM

## 2024-07-03 NOTE — PROCEDURE: LIQUID NITROGEN
Render Note In Bullet Format When Appropriate: No
Detail Level: Detailed
Duration Of Freeze Thaw-Cycle (Seconds): 5
Post-Care Instructions: I reviewed with the patient in detail post-care instructions. Patient is to wear sunprotection, and avoid picking at any of the treated lesions. Pt may apply Vaseline to crusted or scabbing areas.
Number Of Freeze-Thaw Cycles: 1 freeze-thaw cycle
Show Aperture Variable?: Yes
Consent: The patient's consent was obtained including but not limited to risks of crusting, scabbing, blistering, scarring, darker or lighter pigmentary change, recurrence, incomplete removal and infection.

## 2024-08-26 SDOH — ECONOMIC STABILITY: FOOD INSECURITY: WITHIN THE PAST 12 MONTHS, THE FOOD YOU BOUGHT JUST DIDN'T LAST AND YOU DIDN'T HAVE MONEY TO GET MORE.: NEVER TRUE

## 2024-08-26 SDOH — ECONOMIC STABILITY: FOOD INSECURITY: WITHIN THE PAST 12 MONTHS, YOU WORRIED THAT YOUR FOOD WOULD RUN OUT BEFORE YOU GOT MONEY TO BUY MORE.: NEVER TRUE

## 2024-08-26 SDOH — ECONOMIC STABILITY: INCOME INSECURITY: HOW HARD IS IT FOR YOU TO PAY FOR THE VERY BASICS LIKE FOOD, HOUSING, MEDICAL CARE, AND HEATING?: NOT VERY HARD

## 2024-08-26 SDOH — ECONOMIC STABILITY: TRANSPORTATION INSECURITY
IN THE PAST 12 MONTHS, HAS LACK OF TRANSPORTATION KEPT YOU FROM MEETINGS, WORK, OR FROM GETTING THINGS NEEDED FOR DAILY LIVING?: NO

## 2024-08-26 ASSESSMENT — PATIENT HEALTH QUESTIONNAIRE - PHQ9
SUM OF ALL RESPONSES TO PHQ QUESTIONS 1-9: 1
2. FEELING DOWN, DEPRESSED OR HOPELESS: NOT AT ALL
SUM OF ALL RESPONSES TO PHQ QUESTIONS 1-9: 1
2. FEELING DOWN, DEPRESSED OR HOPELESS: NOT AT ALL
1. LITTLE INTEREST OR PLEASURE IN DOING THINGS: SEVERAL DAYS
SUM OF ALL RESPONSES TO PHQ9 QUESTIONS 1 & 2: 1
SUM OF ALL RESPONSES TO PHQ QUESTIONS 1-9: 1
1. LITTLE INTEREST OR PLEASURE IN DOING THINGS: SEVERAL DAYS
SUM OF ALL RESPONSES TO PHQ9 QUESTIONS 1 & 2: 1
SUM OF ALL RESPONSES TO PHQ QUESTIONS 1-9: 1

## 2024-08-28 ENCOUNTER — OFFICE VISIT (OUTPATIENT)
Dept: INTERNAL MEDICINE CLINIC | Facility: CLINIC | Age: 69
End: 2024-08-28

## 2024-08-28 VITALS
WEIGHT: 161.4 LBS | OXYGEN SATURATION: 98 % | HEART RATE: 65 BPM | TEMPERATURE: 97.7 F | HEIGHT: 64 IN | BODY MASS INDEX: 27.55 KG/M2 | SYSTOLIC BLOOD PRESSURE: 132 MMHG | DIASTOLIC BLOOD PRESSURE: 74 MMHG

## 2024-08-28 DIAGNOSIS — R41.3 SHORT-TERM MEMORY LOSS: Primary | ICD-10-CM

## 2024-08-28 DIAGNOSIS — R73.9 HYPERGLYCEMIA: ICD-10-CM

## 2024-08-28 DIAGNOSIS — R41.3 SHORT-TERM MEMORY LOSS: ICD-10-CM

## 2024-08-28 DIAGNOSIS — R51.9 PERSISTENT HEADACHES: ICD-10-CM

## 2024-08-28 DIAGNOSIS — H91.91 HEARING LOSS OF RIGHT EAR, UNSPECIFIED HEARING LOSS TYPE: Chronic | ICD-10-CM

## 2024-08-28 DIAGNOSIS — T75.3XXA MOTION SICKNESS, INITIAL ENCOUNTER: ICD-10-CM

## 2024-08-28 LAB
ALBUMIN SERPL-MCNC: 3.9 G/DL (ref 3.2–4.6)
ALBUMIN/GLOB SERPL: 1.2 (ref 1–1.9)
ALP SERPL-CCNC: 100 U/L (ref 35–104)
ALT SERPL-CCNC: 24 U/L (ref 12–65)
ANION GAP SERPL CALC-SCNC: 9 MMOL/L (ref 9–18)
AST SERPL-CCNC: 25 U/L (ref 15–37)
BASOPHILS # BLD: 0.1 K/UL (ref 0–0.2)
BASOPHILS NFR BLD: 1 % (ref 0–2)
BILIRUB SERPL-MCNC: 0.3 MG/DL (ref 0–1.2)
BUN SERPL-MCNC: 17 MG/DL (ref 8–23)
CALCIUM SERPL-MCNC: 9.3 MG/DL (ref 8.8–10.2)
CHLORIDE SERPL-SCNC: 104 MMOL/L (ref 98–107)
CO2 SERPL-SCNC: 29 MMOL/L (ref 20–28)
CREAT SERPL-MCNC: 0.73 MG/DL (ref 0.6–1.1)
DIFFERENTIAL METHOD BLD: NORMAL
EOSINOPHIL # BLD: 0.1 K/UL (ref 0–0.8)
EOSINOPHIL NFR BLD: 3 % (ref 0.5–7.8)
ERYTHROCYTE [DISTWIDTH] IN BLOOD BY AUTOMATED COUNT: 13.7 % (ref 11.9–14.6)
EST. AVERAGE GLUCOSE BLD GHB EST-MCNC: 133 MG/DL
FOLATE SERPL-MCNC: 29.5 NG/ML (ref 3.1–17.5)
GLOBULIN SER CALC-MCNC: 3.3 G/DL (ref 2.3–3.5)
GLUCOSE SERPL-MCNC: 88 MG/DL (ref 70–99)
HBA1C MFR BLD: 6.3 % (ref 0–5.6)
HCT VFR BLD AUTO: 40.4 % (ref 35.8–46.3)
HGB BLD-MCNC: 12.9 G/DL (ref 11.7–15.4)
HIV 1+2 AB+HIV1 P24 AG SERPL QL IA: NONREACTIVE
HIV 1/2 RESULT COMMENT: NORMAL
IMM GRANULOCYTES # BLD AUTO: 0 K/UL (ref 0–0.5)
IMM GRANULOCYTES NFR BLD AUTO: 0 % (ref 0–5)
LYMPHOCYTES # BLD: 1.5 K/UL (ref 0.5–4.6)
LYMPHOCYTES NFR BLD: 32 % (ref 13–44)
MCH RBC QN AUTO: 29.9 PG (ref 26.1–32.9)
MCHC RBC AUTO-ENTMCNC: 31.9 G/DL (ref 31.4–35)
MCV RBC AUTO: 93.7 FL (ref 82–102)
MONOCYTES # BLD: 0.3 K/UL (ref 0.1–1.3)
MONOCYTES NFR BLD: 7 % (ref 4–12)
NEUTS SEG # BLD: 2.7 K/UL (ref 1.7–8.2)
NEUTS SEG NFR BLD: 57 % (ref 43–78)
NRBC # BLD: 0 K/UL (ref 0–0.2)
PLATELET # BLD AUTO: 235 K/UL (ref 150–450)
PMV BLD AUTO: 12.1 FL (ref 9.4–12.3)
POTASSIUM SERPL-SCNC: 4.6 MMOL/L (ref 3.5–5.1)
PROT SERPL-MCNC: 7.3 G/DL (ref 6.3–8.2)
RBC # BLD AUTO: 4.31 M/UL (ref 4.05–5.2)
SODIUM SERPL-SCNC: 142 MMOL/L (ref 136–145)
T PALLIDUM AB SER QL IA: NONREACTIVE
TSH W FREE THYROID IF ABNORMAL: 1.52 UIU/ML (ref 0.27–4.2)
VIT B12 SERPL-MCNC: 582 PG/ML (ref 193–986)
WBC # BLD AUTO: 4.7 K/UL (ref 4.3–11.1)

## 2024-08-28 RX ORDER — SCOLOPAMINE TRANSDERMAL SYSTEM 1 MG/1
1 PATCH, EXTENDED RELEASE TRANSDERMAL
Qty: 10 PATCH | Refills: 0 | Status: SHIPPED | OUTPATIENT
Start: 2024-08-28

## 2024-08-28 ASSESSMENT — MINI MENTAL STATE EXAM
SHOW: PENCIL [OBJECT] ASK: WHAT IS THIS CALLED?: 1
SUM ALL MMSE QUESTIONS FOR TOTAL SCORE [OUT OF 30].: 29
WHAT STATE [OR PROVINCE] ARE WE IN?: 1
SAY: READ THE WORDS ON THE PAGE AND THEN DO WHAT IT SAYS. THEN HAND THE PERSON
THE SHEET WITH CLOSE YOUR EYES ON IT. IF THE SUBJECT READS AND DOES NOT CLOSE THEIR EYES, REPEAT UP TO THREE TIMES. SCORE ONLY IF SUBJECT CLOSES EYES.: 1
PLACE DESIGN, ERASER AND PENCIL IN FRONT OF THE PERSON.  SAY:  COPY THIS DESIGN PLEASE.  SHOW: DESIGN. ALLOW: MULTIPLE TRIES. WAIT UNTIL PERSON IS FINISHED AND HANDS IT BACK. SCORE: ONLY FOR DIAGRAM WITH 4-SIDED FIGURE BETWEEN TWO 5-SIDED FIGURES: 1
WHAT YEAR IS THIS?: 1
SHOW: WRISTWATCH [OBJECT] ASK: WHAT IS THIS CALLED?: 1
WHAT DAY OF THE WEEK IS THIS?: 1
WHAT CITY/TOWN ARE WE IN?: 1
SAY: FOLD THE PAPER IN HALF ONCE WITH BOTH HANDS, SCORE IF PAPER IS CORRECTLY FOLDED IN HALF.: 1
WHICH SEASON IS THIS?: 1
SAY: PUT THE PAPER DOWN ON THE FLOOR, SCORE IF PAPER IS PLACED BACK ON FLOOR: 1
SAY: I WOULD LIKE YOU TO COUNT BACKWARD FROM 100 BY SEVENS: 5
HAND THE PERSON A PENCIL AND PAPER. SAY: WRITE ANY COMPLETE SENTENCE ON THAT
PIECE OF PAPER. (NOTE: THE SENTENCE MUST MAKE SENSE. IGNORE SPELLING ERRORS): 1
ASK THE PERSON IF HE IS RIGHT OR LEFT-HANDED. TAKE A PIECE OF PAPER AND HOLD IT UP IN
FRONT OF THE PERSON. SAY: TAKE THIS PAPER IN YOUR RIGHT/LEFT HAND (WHICHEVER IS NON-
DOMINANT), SCORE IF PAPER IS PICKED UP IN CORRECT HAND.: 1
WHAT FLOOR ARE WE ON [IN FACILITY]?/ WHAT ROOM ARE WE IN [IN HOME]?: 1
WHAT MONTH IS THIS?: 1
NOW WHAT WERE THE THREE OBJECTS I ASKED YOU TO REMEMBER?: 2
WHAT IS THE NAME OF THIS BUILDING [IN FACILITY]?/WHAT IS THE STREET ADDRESS OF THIS HOUSE [IN HOME]?: 1
WHAT IS TODAY'S DATE?: 1
SAY: I AM GOING TO NAME THREE OBJECTS. WHEN I AM FINISHED, I WANT YOU TO REPEAT
THEM. REMEMBER WHAT THEY ARE BECAUSE I AM GOING TO ASK YOU TO NAME THEM AGAIN IN
A FEW MINUTES.  SAY THE FOLLOWING WORDS SLOWLY AT 1-SECOND INTERVALS - BALL/ CAR/ MAN [ITERATIONS FOR REPEAT ADMINISTRATION]: 3
SAY: I WOULD LIKE YOU TO REPEAT THIS PHRASE AFTER ME: NO IFS, ANDS, OR BUTS.: 1
WHAT COUNTRY ARE WE IN?: 1

## 2024-08-28 ASSESSMENT — ENCOUNTER SYMPTOMS
DIARRHEA: 0
COUGH: 0
NAUSEA: 0
EYE REDNESS: 0
BACK PAIN: 0
SINUS PRESSURE: 0
ABDOMINAL DISTENTION: 0
SHORTNESS OF BREATH: 0
WHEEZING: 0
EYE PAIN: 0
VOMITING: 0
BLOOD IN STOOL: 0
SINUS PAIN: 0
COLOR CHANGE: 0
ABDOMINAL PAIN: 0
CONSTIPATION: 0

## 2024-08-28 NOTE — PROGRESS NOTES
pituitary  gland. Midline position of the pituitary stalk. Unremarkable cavernous  sinuses..    Craniocervical junction is normal.    Normal flow voids at the base of the brain.    Orbits are unremarkable.    7th-8th cranial nerve complexes and cerebellopontine angles are normal.    Normal evaluation of the vestibulocochlear structures. Negative for acoustic  neuroma.    No foci of restricted diffusion.    Negative for acute sinusitis. No coalescent otomastoiditis.    CONCLUSION: Negative for acoustic neuroma or discernible abnormality of  the vestibulocochlear structures.    Mild small vessel arteriopathy.    *-*-*  THIS IS AN ELECTRONICALLY VERIFIED REPORT  3/19/2024 7:03 AM: MD Ross Rutledge MD  [phone]  JS/tiffany  DD: 03/19/2024 06:55 am  DT: 03/19/2024 07:03 am  Accession #: [id number]  Radiology  COPY PAGE 1 of 1  Finding        Assessment/Plan:  1. Short-term memory loss    2. Motion sickness, initial encounter    3. Persistent headaches    4. Hyperglycemia    5. Hearing loss of right ear, unspecified hearing loss type      Orders Placed This Encounter   Procedures    Treponema Pallidum (Syphilis) Antibody     Standing Status:   Future     Standing Expiration Date:   8/28/2025    Hemoglobin A1C     Standing Status:   Future     Standing Expiration Date:   8/28/2025    Folate     Standing Status:   Future     Standing Expiration Date:   8/28/2025    HIV 1/2 Ag/Ab, 4TH Generation,W Rflx Confirm     Standing Status:   Future     Standing Expiration Date:   8/28/2025    Vitamin B12     Standing Status:   Future     Standing Expiration Date:   8/28/2025    TSH with Reflex     Standing Status:   Future     Standing Expiration Date:   8/28/2025    Comprehensive Metabolic Panel     Standing Status:   Future     Standing Expiration Date:   8/28/2025    CBC with Auto Differential     Standing Status:   Future     Standing Expiration Date:   8/28/2025     Orders Placed This Encounter    Medications    scopolamine (TRANSDERM-SCOP) transdermal patch     Sig: Place 1 patch onto the skin every 72 hours     Dispense:  10 patch     Refill:  0     Normal MMSE today.  Labs today - will contact with results.  Reviewed brain MRI from 3/2024, no acute findings.  Consider Aricept.  Recommended brain games (puzzles, word search, etc) and exercise ([facility] and discussed [employer]).   For now, continue current medications.  Follow up as scheduled with ENT.    Follow up as scheduled and PRN.    Julia S Paduano, MATTI - CNP

## 2024-10-23 DIAGNOSIS — R42 VERTIGO: ICD-10-CM

## 2024-10-23 RX ORDER — MECLIZINE HYDROCHLORIDE 25 MG/1
25 TABLET ORAL DAILY PRN
Qty: 30 TABLET | Refills: 5 | Status: SHIPPED | OUTPATIENT
Start: 2024-10-23

## 2025-01-22 ENCOUNTER — OFFICE VISIT (OUTPATIENT)
Dept: ORTHOPEDIC SURGERY | Age: 70
End: 2025-01-22

## 2025-01-22 DIAGNOSIS — M79.672 BILATERAL FOOT PAIN: Primary | ICD-10-CM

## 2025-01-22 DIAGNOSIS — M20.12 HALLUX VALGUS, LEFT: ICD-10-CM

## 2025-01-22 DIAGNOSIS — M19.072 ARTHRITIS OF LEFT FOOT: ICD-10-CM

## 2025-01-22 DIAGNOSIS — M79.671 BILATERAL FOOT PAIN: Primary | ICD-10-CM

## 2025-01-22 DIAGNOSIS — M19.072 ARTHRITIS OF LEFT MIDFOOT: ICD-10-CM

## 2025-01-22 DIAGNOSIS — M20.12 HALLUX VALGUS OF LEFT FOOT: ICD-10-CM

## 2025-01-22 DIAGNOSIS — M20.12 HALLUX VALGUS OF LEFT FOOT: Primary | ICD-10-CM

## 2025-01-22 NOTE — PROGRESS NOTES
Name: Abby Goodson  YOB: 1955  Gender: female  MRN: [id number]    Summary:   Left hallux valgus with TMT instability, right midfoot arthritis       CC: Follow-up (Left foot pain updated xrays in office /NCC Right foot tenderness Xrays obtained of Bilateral foot )       HPI: Abby Goodson is a 69 y.o. female who presents with Follow-up (Left foot pain updated xrays in office /NCC Right foot tenderness Xrays obtained of Bilateral foot )  .  This patient presents the office today with a history of a right midfoot pain.  She was previously scheduled for a left Lapidus bunionectomy back in 2022 and then got COVID.  She is back today with continued pain despite conservative treatment including shoewear modifications as well as orthotic management.    History was obtained by Patient     ROS/Meds/PSH/PMH/FH/SH: I personally reviewed the patients standard intake form.  Below are the pertinents    Tobacco:  reports that she has never smoked. She has never used smokeless tobacco.  Diabetes: None      Physical Examination:  Exam of the right foot system tender to palpation over the first TMT joint.  She has a well corrected bunion on the side.  On the left she has hallux valgus deformity with tender to palpation over the osteophyte at the second tarsometatarsal joint.  There is obvious instability and crepitus of the first TMT joint.  She has palpable pulses and intact sensation.      Imaging:   Interpretation of imaging  Bilateral feet XR: AP, Lateral, Oblique views     ICD-10-CM    1. Bilateral foot pain  M79.671 XR Foot Standard Bilateral    M79.672       2. Hallux valgus of left foot  M20.12       3. Arthritis of left midfoot  M19.072          Report: AP, lateral, oblique x-ray of the bilateral feet demonstrates right midfoot arthritis, left hallux valgus    Impression: Right midfoot arthritis, left hallux valgus   RAVI BLEVINS III, MD           Assessment:   Right midfoot arthritis,

## 2025-01-28 ENCOUNTER — TRANSCRIBE ORDERS (OUTPATIENT)
Facility: HOSPITAL | Age: 70
End: 2025-01-28

## 2025-01-28 DIAGNOSIS — Z12.31 OTHER SCREENING MAMMOGRAM: Primary | ICD-10-CM

## 2025-02-06 ENCOUNTER — HOSPITAL ENCOUNTER (OUTPATIENT)
Dept: GENERAL RADIOLOGY | Age: 70
Discharge: HOME | End: 2025-02-08
Payer: MEDICARE

## 2025-02-06 ENCOUNTER — OFFICE VISIT (OUTPATIENT)
Dept: INTERNAL MEDICINE CLINIC | Facility: CLINIC | Age: 70
End: 2025-02-06
Payer: MEDICARE

## 2025-02-06 VITALS
HEIGHT: 64 IN | BODY MASS INDEX: 27.01 KG/M2 | DIASTOLIC BLOOD PRESSURE: 74 MMHG | WEIGHT: 158.2 LBS | SYSTOLIC BLOOD PRESSURE: 124 MMHG

## 2025-02-06 DIAGNOSIS — M25.552 LEFT HIP PAIN: Primary | ICD-10-CM

## 2025-02-06 DIAGNOSIS — M25.552 LEFT HIP PAIN: ICD-10-CM

## 2025-02-06 PROCEDURE — 1160F RVW MEDS BY RX/DR IN RCRD: CPT | Performed by: NURSE PRACTITIONER

## 2025-02-06 PROCEDURE — 3017F COLORECTAL CA SCREEN DOC REV: CPT | Performed by: NURSE PRACTITIONER

## 2025-02-06 PROCEDURE — 73502 X-RAY EXAM HIP UNI 2-3 VIEWS: CPT

## 2025-02-06 PROCEDURE — G8427 DOCREV CUR MEDS BY ELIG CLIN: HCPCS | Performed by: NURSE PRACTITIONER

## 2025-02-06 PROCEDURE — G8417 CALC BMI ABV UP PARAM F/U: HCPCS | Performed by: NURSE PRACTITIONER

## 2025-02-06 PROCEDURE — 99213 OFFICE O/P EST LOW 20 MIN: CPT | Performed by: NURSE PRACTITIONER

## 2025-02-06 PROCEDURE — G8400 PT W/DXA NO RESULTS DOC: HCPCS | Performed by: NURSE PRACTITIONER

## 2025-02-06 PROCEDURE — 1123F ACP DISCUSS/DSCN MKR DOCD: CPT | Performed by: NURSE PRACTITIONER

## 2025-02-06 PROCEDURE — 1090F PRES/ABSN URINE INCON ASSESS: CPT | Performed by: NURSE PRACTITIONER

## 2025-02-06 PROCEDURE — 1036F TOBACCO NON-USER: CPT | Performed by: NURSE PRACTITIONER

## 2025-02-06 PROCEDURE — 1159F MED LIST DOCD IN RCRD: CPT | Performed by: NURSE PRACTITIONER

## 2025-02-06 SDOH — ECONOMIC STABILITY: FOOD INSECURITY: WITHIN THE PAST 12 MONTHS, YOU WORRIED THAT YOUR FOOD WOULD RUN OUT BEFORE YOU GOT MONEY TO BUY MORE.: NEVER TRUE

## 2025-02-06 SDOH — ECONOMIC STABILITY: FOOD INSECURITY: WITHIN THE PAST 12 MONTHS, THE FOOD YOU BOUGHT JUST DIDN'T LAST AND YOU DIDN'T HAVE MONEY TO GET MORE.: NEVER TRUE

## 2025-02-06 ASSESSMENT — PATIENT HEALTH QUESTIONNAIRE - PHQ9
SUM OF ALL RESPONSES TO PHQ9 QUESTIONS 1 & 2: 0
SUM OF ALL RESPONSES TO PHQ QUESTIONS 1-9: 0
2. FEELING DOWN, DEPRESSED OR HOPELESS: NOT AT ALL
1. LITTLE INTEREST OR PLEASURE IN DOING THINGS: NOT AT ALL
SUM OF ALL RESPONSES TO PHQ QUESTIONS 1-9: 0

## 2025-02-06 NOTE — PROGRESS NOTES
Abby Chambers Kellee (: 1955)     History of Present Illness  The patient presents for evaluation of left hip pain.    She has been experiencing discomfort in her left hip since 10/2024, which she attributes to a potential muscle strain incurred while moving \ natural stones while doing yard work. She does not recall any specific incident of falling or lifting the stone but suspects the strain may have occurred during this project. The pain has escalated to the point where it disrupts her sleep. She also reports a perceived shortening of her left leg compared to her right, which is noticeable when walking or driving. To alleviate stiffness during long drives, she uses an additional cushion. She has not used inserts for the past 2 years due to lack of Medicare coverage, as she is not diabetic. She also mentions occasional stumbles while running with her dogs on uneven terrain. She sought chiropractic treatment on several occasions, which provided some relief. Last week, she initiated self-treatment with ibuprofen, initially taking three tablets but subsequently reducing the dosage to two tablets in the morning and evening. This regimen appears to be effective. She has also been using a pillow between her knees during sleep, a practice that has consistently provided relief.    Supplemental Information  She is scheduled for right foot surgery on 2025 with Dr. Hathaway, which will necessitate non-weightbearing for 2 weeks and elevation of the foot above heart level for an unspecified duration.    MEDICATIONS  ibuprofen      Chief Complaint   Patient presents with    Follow-up     Left hip pain.      Patient Active Problem List   Diagnosis    Sensorineural hearing loss, bilateral    Hearing loss of right ear    History of bunionectomy of right great toe    Environmental and seasonal allergies    Esophageal reflux    Golfers elbow of left upper extremity    Left elbow pain    Closed trimalleolar fracture

## 2025-02-12 ENCOUNTER — OFFICE VISIT (OUTPATIENT)
Dept: INTERNAL MEDICINE CLINIC | Facility: CLINIC | Age: 70
End: 2025-02-12

## 2025-02-12 VITALS
SYSTOLIC BLOOD PRESSURE: 112 MMHG | HEART RATE: 73 BPM | BODY MASS INDEX: 26.98 KG/M2 | OXYGEN SATURATION: 97 % | DIASTOLIC BLOOD PRESSURE: 58 MMHG | TEMPERATURE: 97.5 F | HEIGHT: 64 IN | WEIGHT: 158 LBS

## 2025-02-12 DIAGNOSIS — G43.109 MIGRAINE WITH VERTIGO: Primary | ICD-10-CM

## 2025-02-12 DIAGNOSIS — Z00.00 INITIAL MEDICARE ANNUAL WELLNESS VISIT: ICD-10-CM

## 2025-02-12 LAB
INFLUENZA A ANTIGEN, POC: NEGATIVE
INFLUENZA B ANTIGEN, POC: NEGATIVE
LOT EXPIRE DATE: NORMAL
LOT KIT NUMBER: NORMAL
SARS-COV-2 RNA, POC: NEGATIVE
VALID INTERNAL CONTROL: YES
VENDOR AND KIT NAME POC: NORMAL

## 2025-02-12 RX ORDER — METHYLPREDNISOLONE SODIUM SUCCINATE 40 MG/ML
40 INJECTION INTRAMUSCULAR; INTRAVENOUS ONCE
Status: COMPLETED | OUTPATIENT
Start: 2025-02-12 | End: 2025-02-12

## 2025-02-12 RX ORDER — PREDNISONE 20 MG/1
TABLET ORAL
Qty: 13 TABLET | Refills: 0 | Status: SHIPPED | OUTPATIENT
Start: 2025-02-13 | End: 2025-02-21

## 2025-02-12 RX ORDER — METHYLPREDNISOLONE SODIUM SUCCINATE 40 MG/ML
40 INJECTION INTRAMUSCULAR; INTRAVENOUS ONCE
Status: CANCELLED | OUTPATIENT
Start: 2025-02-12 | End: 2025-02-12

## 2025-02-12 RX ADMIN — METHYLPREDNISOLONE SODIUM SUCCINATE 40 MG: 40 INJECTION INTRAMUSCULAR; INTRAVENOUS at 10:38

## 2025-02-12 ASSESSMENT — LIFESTYLE VARIABLES
HOW OFTEN DO YOU HAVE A DRINK CONTAINING ALCOHOL: MONTHLY OR LESS
HOW MANY STANDARD DRINKS CONTAINING ALCOHOL DO YOU HAVE ON A TYPICAL DAY: 1 OR 2

## 2025-02-12 ASSESSMENT — PATIENT HEALTH QUESTIONNAIRE - PHQ9
SUM OF ALL RESPONSES TO PHQ9 QUESTIONS 1 & 2: 1
2. FEELING DOWN, DEPRESSED OR HOPELESS: SEVERAL DAYS
SUM OF ALL RESPONSES TO PHQ QUESTIONS 1-9: 1
SUM OF ALL RESPONSES TO PHQ QUESTIONS 1-9: 1
1. LITTLE INTEREST OR PLEASURE IN DOING THINGS: NOT AT ALL
SUM OF ALL RESPONSES TO PHQ QUESTIONS 1-9: 1
SUM OF ALL RESPONSES TO PHQ QUESTIONS 1-9: 1

## 2025-02-12 NOTE — PROGRESS NOTES
Abby Trish Goodson (: 1955)     History of Present Illness  The patient presents for evaluation of vertigo.    She reports experiencing vertigo, which she attributes to a perceived elevation in her blood pressure yesterday. However, her blood pressure readings today appear within the normal range. She describes a sensation of her head pounding and swimming, with symptoms subsiding when she remains stationary. She characterizes her dizziness as a combination of room spinning and boat rocking sensations. Accompanying these symptoms is an episode of vomiting last night after consuming Jell-O. She also reports a pounding headache and potential visual disturbances. She does not experience any sinus-related symptoms or fever. Her symptoms have been persistent since Monday. She typically experiences headaches concurrent with her dizziness. She has a history of migraines and speculates that her current symptoms may be stress-induced, as she is scheduled for foot surgery on 2025. She expresses concern about managing post-surgery care, including non-weightbearing and living alone with two dogs, and is considering canceling the surgery due to these stressors. She also reports difficulty driving due to her symptoms. She has not yet taken any medication today but has meclizine on hand. She took meclizine on Monday night and half a dose last night to aid sleep. She also took Tylenol yesterday. She had COVID-19 infection 2 years ago.    Supplemental Information  She has a history of tympanic membrane replacement surgery.    MEDICATIONS  Current: Meclizine, Tylenol      Chief Complaint   Patient presents with    Dizziness    Nausea    Vomiting    Medicare AWV     Patient Active Problem List   Diagnosis    Sensorineural hearing loss, bilateral    Hearing loss of right ear    History of bunionectomy of right great toe    Environmental and seasonal allergies    Esophageal reflux    Golfers elbow of left upper

## 2025-02-13 ENCOUNTER — TELEPHONE (OUTPATIENT)
Dept: ORTHOPEDIC SURGERY | Age: 70
End: 2025-02-13

## 2025-02-13 NOTE — TELEPHONE ENCOUNTER
She left a voicemail message that she has surgery scheduled for next week. She is majorly stressed and has some questions. She says she is considering cancelling. Please give her a call.

## 2025-02-13 NOTE — TELEPHONE ENCOUNTER
Spoke to patient and answered her questions told her that it is ok to be nervous it is a big surgery I also put in a note for nausea meds.

## 2025-02-14 RX ORDER — ONDANSETRON 4 MG/1
4 TABLET, FILM COATED ORAL EVERY 8 HOURS PRN
Status: ON HOLD | COMMUNITY
End: 2025-02-20

## 2025-02-14 RX ORDER — KETOROLAC TROMETHAMINE 10 MG/1
10 TABLET, FILM COATED ORAL EVERY 6 HOURS PRN
COMMUNITY

## 2025-02-14 RX ORDER — TRAMADOL HYDROCHLORIDE 50 MG/1
50 TABLET ORAL EVERY 6 HOURS PRN
COMMUNITY

## 2025-02-14 RX ORDER — WHEAT DEXTRIN 3 G/3.8 G
4 POWDER (GRAM) ORAL DAILY PRN
COMMUNITY

## 2025-02-14 NOTE — PRE-PROCEDURE INSTRUCTIONS
Patient verified name and .  Order for consent  was not found in EHR and  patient verifies procedure.   Type lB surgery, phone assessment complete.  Orders not received.  Labs per surgeon: none at present time  Labs per anesthesia protocol: none    Patient answered medical/surgical history questions at their best of ability. All prior to admission medications documented in EPIC.    Patient instructed to continue taking all prescription medications up to the day of surgery but to take only the following medications the day of surgery according to anesthesia guidelines with a small sip of water: Zofran if needed, Tramadol or Tylenol if needed, Meclizine if needed, Zyrtec if needed, Prednisone, Flonase if needed,     Also, patient is requested to take 2 Tylenol or Tramadol in the morning and then again before bed on the day before surgery. Regular or extra strength may be used.       Patient informed that all vitamins and supplements should be held 7 days prior to surgery and NSAIDS 5 days prior to surgery.   Prescription meds to hold:none    Patient instructed on the following:    > Arrive at [facility] OPC Entrance, time of arrival to be called the day before by 1700  > No food after midnight, patient may drink clear liquids up until 2 hours prior to arrival. No gum, candy, mints.   > Responsible adult must drive patient to the hospital, stay during surgery, and patient will need supervision 24 hours after anesthesia  > Use non moisturizing soap in shower the night before surgery and on the morning of surgery  > All piercings must be removed prior to arrival.    > Leave all valuables (money and jewelry) at home but bring insurance card and ID on DOS.   > You may be required to pay a deductible or co-pay on the day of your procedure. You can pre-pay by calling [phone] if your surgery is at the [facility] or [phone] if your surgery is at the [facility].  > Do not wear make-up, nail polish, lotions, cologne,

## 2025-02-17 ENCOUNTER — APPOINTMENT (OUTPATIENT)
Dept: URBAN - METROPOLITAN AREA CLINIC 24 | Facility: CLINIC | Age: 70
Setting detail: DERMATOLOGY
End: 2025-02-17

## 2025-02-17 DIAGNOSIS — Z71.89 OTHER SPECIFIED COUNSELING: ICD-10-CM

## 2025-02-17 DIAGNOSIS — D485 NEOPLASM OF UNCERTAIN BEHAVIOR OF SKIN: ICD-10-CM

## 2025-02-17 DIAGNOSIS — L81.4 OTHER MELANIN HYPERPIGMENTATION: ICD-10-CM

## 2025-02-17 DIAGNOSIS — D18.0 HEMANGIOMA: ICD-10-CM

## 2025-02-17 DIAGNOSIS — L82.1 OTHER SEBORRHEIC KERATOSIS: ICD-10-CM

## 2025-02-17 DIAGNOSIS — L57.8 OTHER SKIN CHANGES DUE TO CHRONIC EXPOSURE TO NONIONIZING RADIATION: ICD-10-CM

## 2025-02-17 PROBLEM — D48.5 NEOPLASM OF UNCERTAIN BEHAVIOR OF SKIN: Status: ACTIVE | Noted: 2025-02-17

## 2025-02-17 PROBLEM — D18.01 HEMANGIOMA OF SKIN AND SUBCUTANEOUS TISSUE: Status: ACTIVE | Noted: 2025-02-17

## 2025-02-17 PROCEDURE — ? COUNSELING

## 2025-02-17 PROCEDURE — 99213 OFFICE O/P EST LOW 20 MIN: CPT | Mod: 25

## 2025-02-17 PROCEDURE — ? BIOPSY BY SHAVE METHOD

## 2025-02-17 PROCEDURE — ? OTHER

## 2025-02-17 PROCEDURE — 11102 TANGNTL BX SKIN SINGLE LES: CPT

## 2025-02-17 ASSESSMENT — LOCATION ZONE DERM
LOCATION ZONE: ARM
LOCATION ZONE: NECK
LOCATION ZONE: TRUNK
LOCATION ZONE: LEG

## 2025-02-17 ASSESSMENT — LOCATION DETAILED DESCRIPTION DERM
LOCATION DETAILED: LEFT INFERIOR MEDIAL MIDBACK
LOCATION DETAILED: LEFT CLAVICULAR NECK
LOCATION DETAILED: RIGHT ANTERIOR PROXIMAL THIGH
LOCATION DETAILED: LEFT SUPERIOR UPPER BACK
LOCATION DETAILED: RIGHT LATERAL ABDOMEN
LOCATION DETAILED: RIGHT ANTERIOR SHOULDER
LOCATION DETAILED: LEFT MID-UPPER BACK
LOCATION DETAILED: RIGHT PROXIMAL DORSAL FOREARM
LOCATION DETAILED: RIGHT SUPERIOR UPPER BACK
LOCATION DETAILED: LEFT RIB CAGE
LOCATION DETAILED: LEFT LATERAL SUPERIOR CHEST

## 2025-02-17 ASSESSMENT — LOCATION SIMPLE DESCRIPTION DERM
LOCATION SIMPLE: RIGHT FOREARM
LOCATION SIMPLE: RIGHT SHOULDER
LOCATION SIMPLE: RIGHT THIGH
LOCATION SIMPLE: RIGHT UPPER BACK
LOCATION SIMPLE: LEFT UPPER BACK
LOCATION SIMPLE: CHEST
LOCATION SIMPLE: LEFT LOWER BACK
LOCATION SIMPLE: ABDOMEN
LOCATION SIMPLE: LEFT ANTERIOR NECK

## 2025-02-17 NOTE — PROCEDURE: OTHER
Other (Free Text): Has bled several times
Render Risk Assessment In Note?: no
Note Text (......Xxx Chief Complaint.): This diagnosis correlates with the
Detail Level: Zone

## 2025-02-17 NOTE — PROCEDURE: BIOPSY BY SHAVE METHOD
Detail Level: Detailed
Depth Of Biopsy: dermis
Was A Bandage Applied: Yes
Size Of Lesion In Cm: 0
Biopsy Type: H and E
Biopsy Method: Dermablade
Anesthesia Type: 1% lidocaine with epinephrine
Anesthesia Volume In Cc: 0.5
Hemostasis: Drysol
Wound Care: Petrolatum
Dressing: bandage
Destruction After The Procedure: No
Type Of Destruction Used: Curettage
Curettage Text: The wound bed was treated with curettage after the biopsy was performed.
Cryotherapy Text: The wound bed was treated with cryotherapy after the biopsy was performed.
Electrodesiccation Text: The wound bed was treated with electrodesiccation after the biopsy was performed.
Electrodesiccation And Curettage Text: The wound bed was treated with electrodesiccation and curettage after the biopsy was performed.
Silver Nitrate Text: The wound bed was treated with silver nitrate after the biopsy was performed.
Lab: [id number]
Lab Facility: [id number]
Medical Necessity Information: It is in your best interest to select a reason for this procedure from the list below. All of these items fulfill various CMS LCD requirements except the new and changing color options.
Consent: Written consent was obtained and risks were reviewed including but not limited to scarring, infection, bleeding, scabbing, incomplete removal, nerve damage and allergy to anesthesia.
Post-Care Instructions: I reviewed with the patient in detail post-care instructions. Patient is to keep the biopsy site dry overnight, and then apply bacitracin twice daily until healed. Patient may apply hydrogen peroxide soaks to remove any crusting.
Notification Instructions: Patient will be notified of biopsy results. However, patient instructed to call the office if not contacted within 2 weeks.
Billing Type: Third-Party Bill
Information: Selecting Yes will display possible errors in your note based on the variables you have selected. This validation is only offered as a suggestion for you. PLEASE NOTE THAT THE VALIDATION TEXT WILL BE REMOVED WHEN YOU FINALIZE YOUR NOTE. IF YOU WANT TO FAX A PRELIMINARY NOTE YOU WILL NEED TO TOGGLE THIS TO 'NO' IF YOU DO NOT WANT IT IN YOUR FAXED NOTE.

## 2025-02-19 ENCOUNTER — ANESTHESIA EVENT (OUTPATIENT)
Dept: SURGERY | Age: 70
End: 2025-02-19
Payer: MEDICARE

## 2025-02-19 NOTE — PERIOP NOTE
Preop department called to notify patient of arrival time for scheduled procedure. Instructions given to   - Arrive at [facility] En[facility] [address].  - Nothing to eat or drink after midnight unless otherwise indicated. No gum, mints, or ice chips. You may have a small sip of water with medications you were instructed to take.   - Have a responsible adult to drive patient to the hospital, stay during surgery, and patient will need supervision 24 hours after anesthesia.   - Use antibacterial soap in shower the night before surgery and on the morning of surgery.       Was patient contacted: yes  Voicemail left:   Numbers contacted: [phone]   Arrival time: 0600

## 2025-02-20 ENCOUNTER — TELEPHONE (OUTPATIENT)
Dept: ORTHOPEDIC SURGERY | Age: 70
End: 2025-02-20

## 2025-02-20 ENCOUNTER — APPOINTMENT (OUTPATIENT)
Dept: GENERAL RADIOLOGY | Age: 70
End: 2025-02-20
Attending: ORTHOPAEDIC SURGERY
Payer: MEDICARE

## 2025-02-20 ENCOUNTER — ANESTHESIA (OUTPATIENT)
Dept: SURGERY | Age: 70
End: 2025-02-20
Payer: MEDICARE

## 2025-02-20 ENCOUNTER — HOSPITAL ENCOUNTER (OUTPATIENT)
Age: 70
Setting detail: OUTPATIENT SURGERY
Discharge: HOME | End: 2025-02-20
Attending: ORTHOPAEDIC SURGERY | Admitting: ORTHOPAEDIC SURGERY
Payer: MEDICARE

## 2025-02-20 VITALS
SYSTOLIC BLOOD PRESSURE: 135 MMHG | DIASTOLIC BLOOD PRESSURE: 52 MMHG | TEMPERATURE: 97.1 F | RESPIRATION RATE: 14 BRPM | WEIGHT: 158 LBS | OXYGEN SATURATION: 100 % | HEART RATE: 67 BPM | HEIGHT: 64 IN | BODY MASS INDEX: 26.98 KG/M2

## 2025-02-20 PROCEDURE — 6370000000 HC RX 637 (ALT 250 FOR IP): Performed by: ANESTHESIOLOGY

## 2025-02-20 PROCEDURE — 28297 COR HLX VLGS JT ARTHRD: CPT | Performed by: ORTHOPAEDIC SURGERY

## 2025-02-20 PROCEDURE — 6360000002 HC RX W HCPCS: Performed by: ANESTHESIOLOGY

## 2025-02-20 PROCEDURE — 2580000003 HC RX 258: Performed by: NURSE PRACTITIONER

## 2025-02-20 PROCEDURE — 3700000000 HC ANESTHESIA ATTENDED CARE: Performed by: ORTHOPAEDIC SURGERY

## 2025-02-20 PROCEDURE — 2709999900 HC NON-CHARGEABLE SUPPLY: Performed by: ORTHOPAEDIC SURGERY

## 2025-02-20 PROCEDURE — 7100000010 HC PHASE II RECOVERY - FIRST 15 MIN: Performed by: ORTHOPAEDIC SURGERY

## 2025-02-20 PROCEDURE — 20900 REMOVAL OF BONE FOR GRAFT: CPT | Performed by: ORTHOPAEDIC SURGERY

## 2025-02-20 PROCEDURE — 3700000001 HC ADD 15 MINUTES (ANESTHESIA): Performed by: ORTHOPAEDIC SURGERY

## 2025-02-20 PROCEDURE — 7100000000 HC PACU RECOVERY - FIRST 15 MIN: Performed by: ORTHOPAEDIC SURGERY

## 2025-02-20 PROCEDURE — 2720000010 HC SURG SUPPLY STERILE: Performed by: ORTHOPAEDIC SURGERY

## 2025-02-20 PROCEDURE — 3600000014 HC SURGERY LEVEL 4 ADDTL 15MIN: Performed by: ORTHOPAEDIC SURGERY

## 2025-02-20 PROCEDURE — 7100000001 HC PACU RECOVERY - ADDTL 15 MIN: Performed by: ORTHOPAEDIC SURGERY

## 2025-02-20 PROCEDURE — 64447 NJX AA&/STRD FEMORAL NRV IMG: CPT | Performed by: ANESTHESIOLOGY

## 2025-02-20 PROCEDURE — C1713 ANCHOR/SCREW BN/BN,TIS/BN: HCPCS | Performed by: ORTHOPAEDIC SURGERY

## 2025-02-20 PROCEDURE — 7100000011 HC PHASE II RECOVERY - ADDTL 15 MIN: Performed by: ORTHOPAEDIC SURGERY

## 2025-02-20 PROCEDURE — 3600000004 HC SURGERY LEVEL 4 BASE: Performed by: ORTHOPAEDIC SURGERY

## 2025-02-20 PROCEDURE — 64445 NJX AA&/STRD SCIATIC NRV IMG: CPT | Performed by: ANESTHESIOLOGY

## 2025-02-20 PROCEDURE — 6360000002 HC RX W HCPCS: Performed by: NURSE ANESTHETIST, CERTIFIED REGISTERED

## 2025-02-20 PROCEDURE — 6360000002 HC RX W HCPCS: Performed by: NURSE PRACTITIONER

## 2025-02-20 PROCEDURE — C1769 GUIDE WIRE: HCPCS | Performed by: ORTHOPAEDIC SURGERY

## 2025-02-20 PROCEDURE — 28122 PARTIAL REMOVAL OF FOOT BONE: CPT | Performed by: ORTHOPAEDIC SURGERY

## 2025-02-20 DEVICE — IMPLANTABLE DEVICE
Type: IMPLANTABLE DEVICE | Site: FOOT | Status: FUNCTIONAL
Brand: ORTHOLOC 3DI

## 2025-02-20 DEVICE — K-WIRE: Type: IMPLANTABLE DEVICE | Site: FOOT | Status: FUNCTIONAL

## 2025-02-20 DEVICE — IMPLANTABLE DEVICE
Type: IMPLANTABLE DEVICE | Site: FOOT | Status: FUNCTIONAL
Brand: ORTHOLOC

## 2025-02-20 DEVICE — IMPLANTABLE DEVICE
Type: IMPLANTABLE DEVICE | Site: FOOT | Status: FUNCTIONAL
Brand: ORTHOLOC™ 2 LAPIFUSE™

## 2025-02-20 DEVICE — IMPLANTABLE DEVICE
Type: IMPLANTABLE DEVICE | Site: FOOT | Status: FUNCTIONAL
Brand: FUSEFORCE

## 2025-02-20 DEVICE — IMPLANTABLE DEVICE: Type: IMPLANTABLE DEVICE | Site: FOOT | Status: FUNCTIONAL

## 2025-02-20 RX ORDER — DEXAMETHASONE SODIUM PHOSPHATE 4 MG/ML
INJECTION, SOLUTION INTRA-ARTICULAR; INTRALESIONAL; INTRAMUSCULAR; INTRAVENOUS; SOFT TISSUE
Status: DISCONTINUED | OUTPATIENT
Start: 2025-02-20 | End: 2025-02-20 | Stop reason: SDUPTHER

## 2025-02-20 RX ORDER — SODIUM CHLORIDE 0.9 % (FLUSH) 0.9 %
5-40 SYRINGE (ML) INJECTION PRN
Status: DISCONTINUED | OUTPATIENT
Start: 2025-02-20 | End: 2025-02-20 | Stop reason: HOSPADM

## 2025-02-20 RX ORDER — PROPOFOL 10 MG/ML
INJECTION, EMULSION INTRAVENOUS
Status: DISCONTINUED | OUTPATIENT
Start: 2025-02-20 | End: 2025-02-20 | Stop reason: SDUPTHER

## 2025-02-20 RX ORDER — IPRATROPIUM BROMIDE AND ALBUTEROL SULFATE 2.5; .5 MG/3ML; MG/3ML
1 SOLUTION RESPIRATORY (INHALATION)
Status: DISCONTINUED | OUTPATIENT
Start: 2025-02-20 | End: 2025-02-20 | Stop reason: HOSPADM

## 2025-02-20 RX ORDER — ONDANSETRON 4 MG/1
4 TABLET, FILM COATED ORAL EVERY 8 HOURS PRN
Qty: 30 TABLET | Refills: 0 | Status: SHIPPED | OUTPATIENT
Start: 2025-02-20

## 2025-02-20 RX ORDER — ACETAMINOPHEN 500 MG
1000 TABLET ORAL ONCE
Status: COMPLETED | OUTPATIENT
Start: 2025-02-20 | End: 2025-02-20

## 2025-02-20 RX ORDER — ONDANSETRON 2 MG/ML
4 INJECTION INTRAMUSCULAR; INTRAVENOUS
Status: COMPLETED | OUTPATIENT
Start: 2025-02-20 | End: 2025-02-20

## 2025-02-20 RX ORDER — SODIUM CHLORIDE 9 MG/ML
INJECTION, SOLUTION INTRAVENOUS PRN
Status: DISCONTINUED | OUTPATIENT
Start: 2025-02-20 | End: 2025-02-20 | Stop reason: HOSPADM

## 2025-02-20 RX ORDER — SODIUM CHLORIDE 0.9 % (FLUSH) 0.9 %
5-40 SYRINGE (ML) INJECTION EVERY 12 HOURS SCHEDULED
Status: DISCONTINUED | OUTPATIENT
Start: 2025-02-20 | End: 2025-02-20 | Stop reason: HOSPADM

## 2025-02-20 RX ORDER — NALOXONE HYDROCHLORIDE 0.4 MG/ML
INJECTION, SOLUTION INTRAMUSCULAR; INTRAVENOUS; SUBCUTANEOUS PRN
Status: DISCONTINUED | OUTPATIENT
Start: 2025-02-20 | End: 2025-02-20 | Stop reason: HOSPADM

## 2025-02-20 RX ORDER — ROPIVACAINE HYDROCHLORIDE 5 MG/ML
INJECTION, SOLUTION EPIDURAL; INFILTRATION; PERINEURAL
Status: DISCONTINUED | OUTPATIENT
Start: 2025-02-20 | End: 2025-02-20 | Stop reason: SDUPTHER

## 2025-02-20 RX ORDER — HALOPERIDOL 5 MG/ML
1 INJECTION INTRAMUSCULAR
Status: DISCONTINUED | OUTPATIENT
Start: 2025-02-20 | End: 2025-02-20 | Stop reason: HOSPADM

## 2025-02-20 RX ORDER — LIDOCAINE HYDROCHLORIDE 20 MG/ML
INJECTION, SOLUTION EPIDURAL; INFILTRATION; INTRACAUDAL; PERINEURAL
Status: DISCONTINUED | OUTPATIENT
Start: 2025-02-20 | End: 2025-02-20 | Stop reason: SDUPTHER

## 2025-02-20 RX ORDER — SODIUM CHLORIDE, SODIUM LACTATE, POTASSIUM CHLORIDE, CALCIUM CHLORIDE 600; 310; 30; 20 MG/100ML; MG/100ML; MG/100ML; MG/100ML
INJECTION, SOLUTION INTRAVENOUS CONTINUOUS
Status: DISCONTINUED | OUTPATIENT
Start: 2025-02-20 | End: 2025-02-20 | Stop reason: SDUPTHER

## 2025-02-20 RX ORDER — PHENYLEPHRINE HYDROCHLORIDE 10 MG/ML
INJECTION INTRAVENOUS
Status: DISCONTINUED | OUTPATIENT
Start: 2025-02-20 | End: 2025-02-20 | Stop reason: SDUPTHER

## 2025-02-20 RX ORDER — MIDAZOLAM HYDROCHLORIDE 2 MG/2ML
2 INJECTION, SOLUTION INTRAMUSCULAR; INTRAVENOUS
Status: COMPLETED | OUTPATIENT
Start: 2025-02-20 | End: 2025-02-20

## 2025-02-20 RX ORDER — OXYCODONE HYDROCHLORIDE 5 MG/1
5 TABLET ORAL
Status: DISCONTINUED | OUTPATIENT
Start: 2025-02-20 | End: 2025-02-20 | Stop reason: HOSPADM

## 2025-02-20 RX ORDER — FENTANYL CITRATE 50 UG/ML
100 INJECTION, SOLUTION INTRAMUSCULAR; INTRAVENOUS
Status: COMPLETED | OUTPATIENT
Start: 2025-02-20 | End: 2025-02-20

## 2025-02-20 RX ORDER — CEPHALEXIN 500 MG/1
500 CAPSULE ORAL 2 TIMES DAILY
Qty: 14 CAPSULE | Refills: 0 | Status: SHIPPED | OUTPATIENT
Start: 2025-02-20 | End: 2025-02-27

## 2025-02-20 RX ORDER — SODIUM CHLORIDE, SODIUM LACTATE, POTASSIUM CHLORIDE, CALCIUM CHLORIDE 600; 310; 30; 20 MG/100ML; MG/100ML; MG/100ML; MG/100ML
INJECTION, SOLUTION INTRAVENOUS CONTINUOUS
Status: DISCONTINUED | OUTPATIENT
Start: 2025-02-20 | End: 2025-02-20 | Stop reason: HOSPADM

## 2025-02-20 RX ORDER — LIDOCAINE HYDROCHLORIDE 10 MG/ML
1 INJECTION, SOLUTION INFILTRATION; PERINEURAL
Status: DISCONTINUED | OUTPATIENT
Start: 2025-02-20 | End: 2025-02-20 | Stop reason: HOSPADM

## 2025-02-20 RX ORDER — BUPIVACAINE HYDROCHLORIDE 5 MG/ML
INJECTION, SOLUTION EPIDURAL; INTRACAUDAL
Status: DISCONTINUED | OUTPATIENT
Start: 2025-02-20 | End: 2025-02-20 | Stop reason: SDUPTHER

## 2025-02-20 RX ORDER — FENTANYL CITRATE 50 UG/ML
50 INJECTION, SOLUTION INTRAMUSCULAR; INTRAVENOUS EVERY 5 MIN PRN
Status: DISCONTINUED | OUTPATIENT
Start: 2025-02-20 | End: 2025-02-20 | Stop reason: HOSPADM

## 2025-02-20 RX ORDER — ASPIRIN 81 MG/1
81 TABLET ORAL DAILY
Qty: 30 TABLET | Refills: 0 | Status: SHIPPED | OUTPATIENT
Start: 2025-02-20

## 2025-02-20 RX ADMIN — PROPOFOL 100 MCG/KG/MIN: 10 INJECTION, EMULSION INTRAVENOUS at 07:37

## 2025-02-20 RX ADMIN — SODIUM CHLORIDE, SODIUM LACTATE, POTASSIUM CHLORIDE, AND CALCIUM CHLORIDE: 600; 310; 30; 20 INJECTION, SOLUTION INTRAVENOUS at 06:43

## 2025-02-20 RX ADMIN — ACETAMINOPHEN 1000 MG: 500 TABLET, FILM COATED ORAL at 06:47

## 2025-02-20 RX ADMIN — PHENYLEPHRINE HYDROCHLORIDE 50 MCG: 10 INJECTION INTRAVENOUS at 08:03

## 2025-02-20 RX ADMIN — FENTANYL CITRATE 100 MCG: 50 INJECTION, SOLUTION INTRAMUSCULAR; INTRAVENOUS at 06:51

## 2025-02-20 RX ADMIN — Medication 2000 MG: at 07:39

## 2025-02-20 RX ADMIN — DEXAMETHASONE SODIUM PHOSPHATE 4 MG: 4 INJECTION INTRA-ARTICULAR; INTRALESIONAL; INTRAMUSCULAR; INTRAVENOUS; SOFT TISSUE at 06:57

## 2025-02-20 RX ADMIN — SODIUM CHLORIDE, SODIUM LACTATE, POTASSIUM CHLORIDE, AND CALCIUM CHLORIDE: 600; 310; 30; 20 INJECTION, SOLUTION INTRAVENOUS at 08:18

## 2025-02-20 RX ADMIN — PROPOFOL 50 MG: 10 INJECTION, EMULSION INTRAVENOUS at 07:36

## 2025-02-20 RX ADMIN — BUPIVACAINE HYDROCHLORIDE 25 ML: 5 INJECTION, SOLUTION EPIDURAL; INTRACAUDAL; PERINEURAL at 06:50

## 2025-02-20 RX ADMIN — PHENYLEPHRINE HYDROCHLORIDE 100 MCG: 10 INJECTION INTRAVENOUS at 08:33

## 2025-02-20 RX ADMIN — MIDAZOLAM HYDROCHLORIDE 2 MG: 1 INJECTION, SOLUTION INTRAMUSCULAR; INTRAVENOUS at 06:51

## 2025-02-20 RX ADMIN — PHENYLEPHRINE HYDROCHLORIDE 100 MCG: 10 INJECTION INTRAVENOUS at 07:55

## 2025-02-20 RX ADMIN — ONDANSETRON 4 MG: 2 INJECTION, SOLUTION INTRAMUSCULAR; INTRAVENOUS at 09:47

## 2025-02-20 RX ADMIN — PHENYLEPHRINE HYDROCHLORIDE 100 MCG: 10 INJECTION INTRAVENOUS at 08:07

## 2025-02-20 RX ADMIN — ROPIVACAINE HYDROCHLORIDE 15 ML: 5 INJECTION, SOLUTION EPIDURAL; INFILTRATION; PERINEURAL at 06:57

## 2025-02-20 RX ADMIN — LIDOCAINE HYDROCHLORIDE 40 MG: 20 INJECTION, SOLUTION EPIDURAL; INFILTRATION; INTRACAUDAL; PERINEURAL at 07:36

## 2025-02-20 ASSESSMENT — PAIN - FUNCTIONAL ASSESSMENT
PAIN_FUNCTIONAL_ASSESSMENT: 0-10
PAIN_FUNCTIONAL_ASSESSMENT: 0-10

## 2025-02-20 ASSESSMENT — PAIN SCALES - GENERAL
PAINLEVEL_OUTOF10: 0

## 2025-02-20 NOTE — H&P
Outpatient Surgery History and Physical:  Abby Goodson was seen and examined.    CHIEF COMPLAINT:   left foot.     PE:   /62   Pulse 72   Temp 97.7 °F (36.5 °C) (Oral)   Resp 16   Ht 1.626 m (5' 4\")   Wt 71.7 kg (158 lb)   SpO2 97%   BMI 27.12 kg/m²     Heart:   Regular rhythm      Lungs:  Are clear      Past Medical History:    Past Medical History:   Diagnosis Date    Allergic rhinitis, cause unspecified 2015    Anxiety     BPPV (benign paroxysmal positional vertigo)     Dr. Parnell    Chronic pain     left shoulder    Closed fracture of medial malleolus     left foot    Ear problems     steroid injections to Right ear in 2017- causes a nonhealing hole to Right ear    Enthesopathy of ankle and tarsus, unspecified     GERD (gastroesophageal reflux disease)     s/p Nissen fund -mostly resolved, diet controlled, otc prn    Hallux valgus (acquired)     History of 2019 novel coronavirus disease (COVID-19) 2021    History of gestational diabetes     no further complications    History of mammogram 2021    negative at [facility]    History of Nissen fundoplication 2007    PONV (postoperative nausea and vomiting)     Prediabetes        Surgical History:   Past Surgical History:   Procedure Laterality Date    APPENDECTOMY      BUNIONECTOMY Right     toe     SECTION  ,     COLONOSCOPY  2017    normal colon- repeat     EAR SURGERY Right 2023    RIGHT FAT MYRINGOPLASTY performed by Devonte Conteh DO at [facility]    FINGER TRIGGER RELEASE Left     FINGER TRIGGER RELEASE Right 08/10/2023    FINGER TRIGGER RELEASE right ring ultra sound guided performed by Tashia Ruiz MD at [facility]    LAPAROSCOPIC NISSEN FUNDOPLICATION      ORTHOPEDIC SURGERY Left     ankle repair    TYMPANOPLASTY Right 2024    Dr. Nino    UPPER GASTROINTESTINAL ENDOSCOPY  2017    small gastric polyps less than 5mm- DR. Perez       Social History: Patient  reports  that she has never smoked. She has never used smokeless tobacco. She reports current alcohol use of about 1.0 - 2.0 standard drink of alcohol per week. She reports that she does not use drugs.    Family History:   Family History   Problem Relation Age of Onset    Cancer Maternal Grandfather         Bone cancer    Hearing Loss Maternal Grandfather     Cancer Paternal Grandmother         Cancer    Stroke Maternal Grandmother     Cancer Other         Colon    Diabetes Father         Adult onset    Cancer Father         Bone cancer    Cancer Mother         7 different cancers including breast & liver    Breast Cancer Mother     Cancer Paternal Cousin     Colon Cancer Paternal Cousin        Allergies: Reviewed per EMR  Levofloxacin, Lexapro [escitalopram], Mycophenolate mofetil, and Ciprofloxacin    Medications:    Prior to Admission medications    Medication Sig Start Date End Date Taking? Authorizing Provider   TURMERIC-GINGER PO Take by mouth Daily   Yes Vicky Birch MD   Wheat Dextrin (BENEFIBER) POWD Take 4 g by mouth daily as needed   Yes Vicky Birch MD   ondansetron (ZOFRAN) 4 MG tablet Take 1 tablet by mouth every 8 hours as needed for Nausea or Vomiting   Yes Vicky Birch MD   ketorolac (TORADOL) 10 MG tablet Take 1 tablet by mouth every 6 hours as needed for Pain   Yes Vicky Birch MD   traMADol (ULTRAM) 50 MG tablet Take 1 tablet by mouth every 6 hours as needed for Pain. Max Daily Amount: 200 mg   Yes Vicky Birch MD   predniSONE (DELTASONE) 20 MG tablet 3 tabs daily x 2 days; 2 tabs daily x 2 days; 1 tab daily x 2 days; 1/2 tab daily x 2 days; then d/c 2/13/25 2/21/25  Paduano, Julia S, APRN - CNP   Multiple Vitamins-Minerals (MULTIVITAMIN ADULTS PO) Take by mouth daily    ProviderVicky MD   meclizine (ANTIVERT) 25 MG tablet Take 1 tablet by mouth daily as needed for Dizziness 10/23/24   Paduano, Julia S, APRN - CNP   scopolamine (TRANSDERM-SCOP)

## 2025-02-20 NOTE — OP NOTE
Operative Note    Patient:Abby Goodson  MRN: [id number]    Date Of Surgery: 2/20/2025    Surgeon: Joshua Hathaway MD    Assistant Surgeon: None    Pre Op Diagnosis:  Pre-Op Diagnosis Codes:      * Hallux valgus, left [M20.12]     * Arthritis of left foot [M19.072]      Post Op Diagnosis:   same    Procedures Performed:  Left Lapidus bunionectomy with [place] osteotomy, 28297  Left calcaneal autograft harvest, 20900  Left second tarsometatarsal joint exostectomy for dorsal bossing, 12742    Implants:   Implant Name Type Inv. Item Serial No.  Lot No. LRB No. Used Action   STAPLE BNE [id number] NIT SHP MEM COMPRESSIVE FOR DISP - [id number]  STAPLE BNE [id number] NIT SHP MEM COMPRESSIVE FORC DISP  [employer] [facility] [id number] Left 1 Implanted   K WIRE FIX L150MM DIA2.5MM FOR ORTHOLOC 3DI SYS - [id number]  K WIRE FIX L150MM DIA2.5MM FOR ORTHOLOC 3DI SYS  [employer] [facility] [id number] Left 2 Implanted   PLATE BNE LAPIDUS STD LT ORTHOLOC 2 LAPIFUSE - [id number]  PLATE BNE LAPIDUS STD LT ORTHOLOC 2 LAPIFUSE  [employer] [facility] [id number] Left 1 Implanted   SCREW BNE L35MM OD4MM SELF DRL GERMAINE LNG THRD [employer] - [id number]  SCREW BNE L35MM OD4MM SELF DRL GERMAINE LNG THRD [facility] [id number] Left 1 Implanted   SCREW BNE L14MM DIA3.5MM KAILA FT ANK TI BEL FULL THRD FOR - [id number]  SCREW BNE L14MM DIA3.5MM KAILA FT ANK TI BEL FULL THRD FOR  [employer] [facility] [id number] Left 4 Implanted   SCREW BNE L20MM DIA3.5MM KAILA FT ANK TI NONLOCKING FULL - [id number]  SCREW BNE L20MM DIA3.5MM KAILA FT ANK TI NONLOCKING FULL  [employer] [facility] [id number] Left 1 Implanted   SCREW BNE L16MM DIA3.5MM KAILA FT ANK TI BEL FULL THRD FOR - [id number]  SCREW BNE L16MM DIA3.5MM KAILA FT ANK TI BEL FULL THRD FOR  [employer] [id number] Left 1 Implanted       Anesthesia:  Choice    Blood  Loss:  minimal    Tourniquet:  Estimated calf 35 minutes    Pre Operative Abx:   Ancef 2g            Pre Operative Course:  Abby Goodson is a 69 y.o. female who has a history of left hallux valgus and second TMT arthritis with dorsal bossing.    Operation In Detail:  Patient was evaluated in the preoperative area.  We had a long discussion about the procedure and postoperative protocols.  The patient was then brought back to the operating room suite and placed in the operating room table.  A timeout was taken to identify the patient, procedure being performed, and laterality.  After this the patient was prepped and draped in the normal sterile fashion using a Betadine solution and/or a ChloraPrep solution.  A timeout was then taken to identify the patient his name, date of birth, laterality, and procedure being performed.  We also identified allergies and any concerns about the operation.  Attention was then placed to the operative extremity.    A block was placed by the department of anesthesia.  In a preop surgical timeout the left lower extremity was identified as correct surgical site and prepped and draped in a standard sterile fashions and ChloraPrep solution.I identified the calcaneal tuberosity.  A separate small lateral 3 cm incision was made centered the calcaneal tuberosity, away from the other operative site.  Using a 15 blade we cut through skin, and we used scissors to go down to bone.  The sural nerve was not encountered.  I then palpated the bone with a freerer to make sure I was on bone.  Next the accumed bone harvester passed into the calcaneus to harvest bone from the calcaneus.  A good amount of bone graft was taken with the harvester and placed into a cup.  The calcaneous wound was then irrigated and closed with nylon suture.  A first webspace dissection was performed at that time with release the fibular sesamoid.  The medial approach the first MTP joint was for a correct level of the

## 2025-02-20 NOTE — ANESTHESIA PROCEDURE NOTES
Peripheral Block    Patient location during procedure: pre-op  Reason for block: post-op pain management and at surgeon's request  Start time: 2/20/2025 6:50 AM  End time: 2/20/2025 6:55 AM  Staffing  Performed: anesthesiologist   Anesthesiologist: Juan M Coronel MD  Performed by: Juan M Coronel MD  Authorized by: Juan M Coronel MD    Preanesthetic Checklist  Completed: patient identified, IV checked, site marked, risks and benefits discussed, surgical/procedural consents, equipment checked, pre-op evaluation, timeout performed, anesthesia consent given, oxygen available, monitors applied/VS acknowledged and blood product R/B/A discussed and consented  Peripheral Block   Patient position: supine  Prep: ChloraPrep  Provider prep: mask and sterile gloves  Patient monitoring: cardiac monitor, continuous pulse ox, frequent blood pressure checks, IV access, oxygen and responsive to questions  Block type: Sciatic  Popliteal  Laterality: left  Injection technique: single-shot  Guidance: ultrasound guided  Local infiltration: lidocaine  Infiltration strength: 1 %  Local infiltration: lidocaine  Dose: 3 mL    Needle   Needle type: insulated echogenic nerve stimulator needle   Needle gauge: 20 G  Needle localization: ultrasound guidance  Needle length: 10 cm  Assessment   Injection assessment: negative aspiration for heme, no paresthesia on injection, low pressure verified by pressure monitor, no intravascular symptoms and local visualized surrounding nerve on ultrasound  Paresthesia pain: none  Slow fractionated injection: yes  Hemodynamics: stable  Outcomes: uncomplicated    Medications Administered  BUPivacaine (MARCAINE) PF injection 0.5% - Perineural   25 mL - 2/20/2025 6:50:00 AM

## 2025-02-20 NOTE — ANESTHESIA POSTPROCEDURE EVALUATION
Department of Anesthesiology  Postprocedure Note    Patient: Abby Goodson  MRN: [id number]  YOB: 1955  Date of evaluation: 2/20/2025    Procedure Summary       Date: 02/20/25 Room / Location: [facility] OP OR 02 / [facility]    Anesthesia Start: 0733 Anesthesia Stop: 0837    Procedure: Left lapidus bunionectomy with calcaneal autograft harvest, left [place] osteotomy, left 2nd TMT joint exostectomy (Left: Foot) Diagnosis:       Hallux valgus, left      Arthritis of left foot      (Hallux valgus, left [M20.12])      (Arthritis of left foot [M19.072])    Surgeons: Joshua Hathaway III, MD Responsible Provider: Juan M Coronel MD    Anesthesia Type: general ASA Status: 2            Anesthesia Type: No value filed.    Cathy Phase I: Cathy Score: 8    Cathy Phase II: Cathy Score: 10    Anesthesia Post Evaluation    Patient location during evaluation: PACU  Patient participation: complete - patient participated  Level of consciousness: awake and alert  Airway patency: patent  Nausea & Vomiting: no nausea and no vomiting  Cardiovascular status: hemodynamically stable  Respiratory status: acceptable, nonlabored ventilation and spontaneous ventilation  Hydration status: euvolemic  Comments: BP (!) 135/52   Pulse 67   Temp 97.1 °F (36.2 °C) (Temporal)   Resp 14   Ht 1.626 m (5' 4\")   Wt 71.7 kg (158 lb)   SpO2 100%   BMI 27.12 kg/m²     Multimodal analgesia pain management approach  Pain management: adequate and satisfactory to patient    No notable events documented.

## 2025-02-20 NOTE — ANESTHESIA PROCEDURE NOTES
Peripheral Block    Patient location during procedure: pre-op  Reason for block: post-op pain management and at surgeon's request  Start time: 2/20/2025 6:57 AM  End time: 2/20/2025 6:59 AM  Staffing  Performed: anesthesiologist   Anesthesiologist: Juan M Coronel MD  Performed by: Juan M Coronel MD  Authorized by: Juan M Coronel MD    Preanesthetic Checklist  Completed: patient identified, IV checked, site marked, risks and benefits discussed, surgical/procedural consents, equipment checked, pre-op evaluation, timeout performed, anesthesia consent given, oxygen available, monitors applied/VS acknowledged and blood product R/B/A discussed and consented  Peripheral Block   Patient position: supine  Prep: ChloraPrep  Provider prep: sterile gloves and mask  Patient monitoring: continuous pulse ox, cardiac monitor, frequent blood pressure checks, IV access, oxygen and responsive to questions  Block type: Femoral  Adductor canal  Laterality: left  Injection technique: single-shot  Guidance: ultrasound guided  Local infiltration: lidocaine  Infiltration strength: 1 %  Local infiltration: lidocaine  Dose: 3 mL    Needle   Needle type: insulated echogenic nerve stimulator needle   Needle gauge: 20 G  Needle localization: ultrasound guidance  Needle length: 10 cm  Assessment   Injection assessment: negative aspiration for heme, no paresthesia on injection, no intravascular symptoms and low pressure verified by pressure monitor  Paresthesia pain: none  Slow fractionated injection: yes  Hemodynamics: stable  Outcomes: uncomplicated    Medications Administered  dexAMETHasone (DECADRON) injection 4 mg/mL - Perineural   4 mg - 2/20/2025 6:57:00 AM  ropivacaine (NAROPIN) injection 0.5% - Perineural   15 mL - 2/20/2025 6:57:00 AM

## 2025-02-20 NOTE — ANESTHESIA PRE PROCEDURE
Department of Anesthesiology  Preprocedure Note       Name:  Abby Goodson   Age:  69 y.o.  :  1955                                          MRN:  [id number]         Date:  2025      Surgeon: Surgeon(s):  Joshua Hathaway III, MD    Procedure: Procedure(s):  Left lapidus bunionectomy with calcaneal autograft harvest, left [place] osteotomy, left 2nd TMT joint exostectomy    Medications prior to admission:   Prior to Admission medications    Medication Sig Start Date End Date Taking? Authorizing Provider   TURMERIC-GINGER PO Take by mouth Daily   Yes ProviderVicky MD   Wheat Dextrin (BENEFIBER) POWD Take 4 g by mouth daily as needed   Yes ProviderVicky MD   ondansetron (ZOFRAN) 4 MG tablet Take 1 tablet by mouth every 8 hours as needed for Nausea or Vomiting   Yes ProviderVicky MD   ketorolac (TORADOL) 10 MG tablet Take 1 tablet by mouth every 6 hours as needed for Pain   Yes Vicky Birch MD   traMADol (ULTRAM) 50 MG tablet Take 1 tablet by mouth every 6 hours as needed for Pain. Max Daily Amount: 200 mg   Yes ProviderVicky MD   predniSONE (DELTASONE) 20 MG tablet 3 tabs daily x 2 days; 2 tabs daily x 2 days; 1 tab daily x 2 days; 1/2 tab daily x 2 days; then d/c 25  Paduano, Julia S, APRN - CNP   Multiple Vitamins-Minerals (MULTIVITAMIN ADULTS PO) Take by mouth daily    Vicky Birch MD   meclizine (ANTIVERT) 25 MG tablet Take 1 tablet by mouth daily as needed for Dizziness 10/23/24   Paduano, Julia S, APRN - CNP   scopolamine (TRANSDERM-SCOP) transdermal patch Place 1 patch onto the skin every 72 hours  Patient taking differently: Place 1 patch onto the skin every 72 hours as needed When traveling 24   Paduano, Julia S, APRN - CNP   MAGNESIUM GLUCONATE PO Take 300 mg by mouth    Vicky Birch MD DEVILS CLAW PO Take by mouth daily Bandar Kirk    ProviderVicky MD   ferrous sulfate (IRON 325) 325 (65 Fe) MG

## 2025-03-04 ENCOUNTER — TELEPHONE (OUTPATIENT)
Dept: ORTHOPEDIC SURGERY | Age: 70
End: 2025-03-04

## 2025-03-04 NOTE — TELEPHONE ENCOUNTER
Spoke with patient and she will try ibuprofen for pain. If the blister type pain on her great toe isn't controlled she will call back and come in later today for a bandage change or come in during clinic tomorrow.

## 2025-03-05 ENCOUNTER — OFFICE VISIT (OUTPATIENT)
Dept: ORTHOPEDIC SURGERY | Age: 70
End: 2025-03-05

## 2025-03-05 DIAGNOSIS — M20.12 HALLUX VALGUS OF LEFT FOOT: Primary | ICD-10-CM

## 2025-03-05 DIAGNOSIS — M19.072 ARTHRITIS OF LEFT MIDFOOT: ICD-10-CM

## 2025-03-05 PROCEDURE — M5002 MISC BOOT SOCK: HCPCS | Performed by: NURSE PRACTITIONER

## 2025-03-05 PROCEDURE — M5017 MISC EVENUP: HCPCS | Performed by: NURSE PRACTITIONER

## 2025-03-05 NOTE — PROGRESS NOTES
Patient was given two extra Boot Socks for the left foot.  Patient was fitted and instructed on an Even Up for the right foot.  The patient was prescribed a walker boot for the patient's left foot. The patient wears a size 8.5 shoe and I fitted them with a M size boot. The patient was fitted and instructed on the use of prescribed walker boot by a Registered Orthopedic Technician. I explained how to fit themselves and that the plastic flexible piece should always be on the front of the boot and secured by the Velcro straps on top. The air bladder in the boot was adjusted according to proper fit and comfort. The patient walked a short distance and acknowledged satisfaction with current fit. I also explained that they need a heel lift or a higher heeled shoe for the uninvolved LE to help normalize gait and avoid excessive low back stress/strain due to leg length inequality created from walker boot.Patient read and signed documenting they understand and agree to [facility]'s current DME return policy.     The above DME items were also checked for same/similar on the Medicare portal. An ABN was issued if necessary.

## 2025-03-05 NOTE — PROGRESS NOTES
Name: Abby Goodson  YOB: 1955  Gender: female  MRN: [id number]    Procedure Performed:  Left Lapidus bunionectomy with [place] osteotomy  Left calcaneal autograft harvest  Left second tarsometatarsal joint exostectomy for dorsal bossing        Date of Procedure: 02/20/2025      Subjective: Patient is doing okay today.  She has had previous foot ankle surgery before so somewhat knows to expect as far as recovery goes.  Her pain seems to be well-managed under control today.      Physical Examination: The incisional area appears to be healing well shows no signs of infection.  She does have palpable pulses and intact sensation to foot.  She has no signs of DVT at this time, denies any calf or behind the knee pain and does present with a negative Homans' sign.  The great toe seems to be well surgically aligned and corrected.  She wiggles her toes effectively without pain.  There is noted swelling to the foot dorsally as well as throughout all of her toes.        Imaging:   No imaging reviewed          Assessment:   Status post left Lapidus bunionectomy with Angel[place] osteotomy calcaneal autograft harvest with second TMT joint exostectomy.  We discussed progression care today as well as expectations until the next follow-up visit.  Question and concerns were addressed, she verbalized understanding of today's conversation.      Plan:   3 This is stable chronic illness/condition  Treatment at this time: Sutures removed, Steri-Strips were placed.  Patient will be placed into a cam walker boot as a matter medical necessity where she may progressively bear weight on the affected extremity she can tolerate and swelling allows.  She was encouraged continue elevating affected extremity for swelling.  She may now discontinue her daily aspirin therapy as DVT prophylaxis once comfortable in the walker boot.  She may now get the affected extremity wet including showering and soaking as needed, recommendation of

## 2025-04-04 ENCOUNTER — OFFICE VISIT (OUTPATIENT)
Dept: ORTHOPEDIC SURGERY | Age: 70
End: 2025-04-04

## 2025-04-04 DIAGNOSIS — M20.12 HALLUX VALGUS OF LEFT FOOT: Primary | ICD-10-CM

## 2025-04-04 DIAGNOSIS — M19.072 ARTHRITIS OF LEFT MIDFOOT: ICD-10-CM

## 2025-04-04 PROCEDURE — 99024 POSTOP FOLLOW-UP VISIT: CPT | Performed by: ORTHOPAEDIC SURGERY

## 2025-04-04 NOTE — PROGRESS NOTES
Name: Abby Goodson  YOB: 1955  Gender: female  MRN: [id number]    Procedure Performed:Left lapidus bunionectomy with calcaneal autograft harvest, left [place] osteotomy, left 2nd TMT joint exostectomy - Left        Date of Procedure: 2/20/2025      Subjective: Doing well      Physical Examination: Incisions are healing well outside of infection.  She has good alignment and range of motion of the toe.        Imaging:   Interpretation of imaging  Left foot XR: AP, Lateral, Oblique views     ICD-10-CM    1. Hallux valgus of left foot  M20.12 XR FOOT LEFT (MIN 3 VIEWS)      2. Arthritis of left midfoot  M19.072 XR FOOT LEFT (MIN 3 VIEWS)         Report: AP, lateral, oblique x-ray of the left foot demonstrates no hardware failure    Impression: No hardware failure   RAVI BLEVINS III, MD           Assessment:   Left Lapidus bunionectomy      Plan:   3 This is stable chronic illness/condition  Treatment at this time: Physical Therapy  Studies ordered: Foot XR needed @ Next Visit    Weight-bearing status: WBAT        Return to work/work restrictions: none  No medications given    She will wean out of the walker boot and start supervised physical therapy.  She will return in 4 weeks for x-ray or sooner if needed

## 2025-04-14 ENCOUNTER — OFFICE VISIT (OUTPATIENT)
Dept: INTERNAL MEDICINE CLINIC | Facility: CLINIC | Age: 70
End: 2025-04-14
Payer: MEDICARE

## 2025-04-14 VITALS
BODY MASS INDEX: 26.63 KG/M2 | SYSTOLIC BLOOD PRESSURE: 122 MMHG | HEIGHT: 64 IN | TEMPERATURE: 97.2 F | HEART RATE: 68 BPM | DIASTOLIC BLOOD PRESSURE: 60 MMHG | OXYGEN SATURATION: 97 % | WEIGHT: 156 LBS

## 2025-04-14 DIAGNOSIS — M25.552 LEFT HIP PAIN: Primary | ICD-10-CM

## 2025-04-14 PROCEDURE — 1160F RVW MEDS BY RX/DR IN RCRD: CPT | Performed by: NURSE PRACTITIONER

## 2025-04-14 PROCEDURE — 3017F COLORECTAL CA SCREEN DOC REV: CPT | Performed by: NURSE PRACTITIONER

## 2025-04-14 PROCEDURE — 99213 OFFICE O/P EST LOW 20 MIN: CPT | Performed by: NURSE PRACTITIONER

## 2025-04-14 PROCEDURE — 1090F PRES/ABSN URINE INCON ASSESS: CPT | Performed by: NURSE PRACTITIONER

## 2025-04-14 PROCEDURE — G8427 DOCREV CUR MEDS BY ELIG CLIN: HCPCS | Performed by: NURSE PRACTITIONER

## 2025-04-14 PROCEDURE — 1159F MED LIST DOCD IN RCRD: CPT | Performed by: NURSE PRACTITIONER

## 2025-04-14 PROCEDURE — G8400 PT W/DXA NO RESULTS DOC: HCPCS | Performed by: NURSE PRACTITIONER

## 2025-04-14 PROCEDURE — 1036F TOBACCO NON-USER: CPT | Performed by: NURSE PRACTITIONER

## 2025-04-14 PROCEDURE — 1123F ACP DISCUSS/DSCN MKR DOCD: CPT | Performed by: NURSE PRACTITIONER

## 2025-04-14 PROCEDURE — G8417 CALC BMI ABV UP PARAM F/U: HCPCS | Performed by: NURSE PRACTITIONER

## 2025-04-14 NOTE — PROGRESS NOTES
Abby Goodson (: 1955)     History of Present Illness  The patient presents for evaluation of left hip pain.    She underwent a left hallux vagus procedure on 2025 with Dr. Hathaway, followed by a fall 5 days post-surgery. The fall occurred while using a knee scooter, resulting in her left knee hitting the ground and her hip impacting the edge of a chair. This incident led to a significant bruise at the junction of her hip and thigh. Despite the fall, her foot remained unaffected. Discomfort in the hip has been present since the fall, particularly when lying on the left side, a position traditionally favored. Pain intensified after using a seated bicycle during physical therapy sessions on Friday and Saturday. More inflammation is felt on the left side compared to the right. The bruising has since resolved, but uncertainty remains if the fall resulted in damage to a muscle, tendon, or ligament. Pain management has included arnica and ibuprofen. Physical therapy is ongoing, and a knee scooter has been used for mobility. Awareness of any swelling in the leg is not present as the primary focus has been on the foot and knee. She was having left hip pain prior to surgery.  Left hip x-ray on 2025 showed mild arthrosis.      Chief Complaint   Patient presents with    left hip pain     Patient Active Problem List   Diagnosis    Sensorineural hearing loss, bilateral    Hearing loss of right ear    History of bunionectomy of right great toe    Environmental and seasonal allergies    Esophageal reflux    Golfers elbow of left upper extremity    Left elbow pain    Closed trimalleolar fracture of left ankle    Right shoulder pain    Foot pain, left    Hallux valgus of left foot    Vertigo    Allergic rhinitis    CAPPS (dyspnea on exertion)    Trigger finger, right ring finger    Motion sickness    Short-term memory loss    Prediabetes    Hallux valgus, left    Arthritis of left foot      Reviewed and

## 2025-04-16 ENCOUNTER — OFFICE VISIT (OUTPATIENT)
Dept: ORTHOPEDIC SURGERY | Age: 70
End: 2025-04-16
Payer: MEDICARE

## 2025-04-16 DIAGNOSIS — M25.552 LEFT HIP PAIN: Primary | ICD-10-CM

## 2025-04-16 DIAGNOSIS — M16.12 PRIMARY OSTEOARTHRITIS OF LEFT HIP: ICD-10-CM

## 2025-04-16 DIAGNOSIS — M70.62 TROCHANTERIC BURSITIS OF LEFT HIP: ICD-10-CM

## 2025-04-16 PROCEDURE — 3017F COLORECTAL CA SCREEN DOC REV: CPT | Performed by: NURSE PRACTITIONER

## 2025-04-16 PROCEDURE — G8417 CALC BMI ABV UP PARAM F/U: HCPCS | Performed by: NURSE PRACTITIONER

## 2025-04-16 PROCEDURE — 1123F ACP DISCUSS/DSCN MKR DOCD: CPT | Performed by: NURSE PRACTITIONER

## 2025-04-16 PROCEDURE — 99204 OFFICE O/P NEW MOD 45 MIN: CPT | Performed by: NURSE PRACTITIONER

## 2025-04-16 PROCEDURE — G8400 PT W/DXA NO RESULTS DOC: HCPCS | Performed by: NURSE PRACTITIONER

## 2025-04-16 PROCEDURE — 1036F TOBACCO NON-USER: CPT | Performed by: NURSE PRACTITIONER

## 2025-04-16 PROCEDURE — G8428 CUR MEDS NOT DOCUMENT: HCPCS | Performed by: NURSE PRACTITIONER

## 2025-04-16 PROCEDURE — 1090F PRES/ABSN URINE INCON ASSESS: CPT | Performed by: NURSE PRACTITIONER

## 2025-04-16 NOTE — PROGRESS NOTES
daily as needed for Dizziness 10/23/24   Paduano, Julia S, APRN - CNP   scopolamine (TRANSDERM-SCOP) transdermal patch Place 1 patch onto the skin every 72 hours  Patient taking differently: Place 1 patch onto the skin every 72 hours as needed for Nausea or Vomiting When traveling 8/28/24   Paduano, Julia S, APRN - CNP   MAGNESIUM GLUCONATE PO Take 300 mg by mouth    Vicky Birch MD DEVILS CLAW PO Take by mouth daily Bandar [employer] Claw    ProviderVicky MD   ferrous sulfate (IRON 325) 325 (65 Fe) MG tablet Take 1 tablet by mouth Twice a Week    Vicky Birch MD   acetaminophen (TYLENOL) 500 MG tablet Take 1 tablet by mouth every 6 hours as needed for Pain    Vicky Birch MD   Misc Natural Products (ELDERBERRY/VITAMIN C/ZINC PO) Take by mouth daily    Vicky Birch MD   cetirizine (ZYRTEC) 10 MG tablet Take 1 tablet by mouth daily as needed    Vicky Birch MD   calcium carbonate 1500 (600 Ca) MG TABS tablet Take 1 tablet by mouth daily    Automatic Reconciliation, Ar   fluticasone (FLONASE) 50 MCG/ACT nasal spray by Nasal route as needed     Automatic Reconciliation, Ar       Family History:     Family History   Problem Relation Age of Onset    Cancer Maternal Grandfather         Bone cancer    Hearing Loss Maternal Grandfather     Cancer Paternal Grandmother         Cancer    Stroke Maternal Grandmother     Cancer Other         Colon    Diabetes Father         Adult onset    Cancer Father         Bone cancer    Cancer Mother         7 different cancers including breast & liver    Breast Cancer Mother     Cancer Paternal Cousin     Colon Cancer Paternal Cousin        Social History:      Social History     Tobacco Use    Smoking status: Never    Smokeless tobacco: Never   Substance Use Topics    Alcohol use: Yes     Alcohol/week: 1.0 - 2.0 standard drink of alcohol     Types: 1 - 2 Glasses of wine per week     Comment: Hard cider 1 month         Allergies:

## 2025-05-12 ENCOUNTER — OFFICE VISIT (OUTPATIENT)
Dept: ORTHOPEDIC SURGERY | Age: 70
End: 2025-05-12

## 2025-05-12 DIAGNOSIS — M19.072 ARTHRITIS OF LEFT MIDFOOT: ICD-10-CM

## 2025-05-12 DIAGNOSIS — M20.12 HALLUX VALGUS OF LEFT FOOT: Primary | ICD-10-CM

## 2025-05-12 PROCEDURE — 99024 POSTOP FOLLOW-UP VISIT: CPT | Performed by: NURSE PRACTITIONER

## 2025-05-12 NOTE — PROGRESS NOTES
Name: Abby Goodson  YOB: 1955  Gender: female  MRN: [id number]    Procedure Performed:  Left Lapidus bunionectomy with Baker osteotomy  Left calcaneal autograft harvest  Left second tarsometatarsal joint exostectomy for dorsal bossing           Date of Procedure: 02/20/2025      Subjective: Patient reports overall that she feels like she is making progress.  She is continuing with therapy for now.  She does report that there is pretty persistent swelling on a regular basis.  She is only able to fit into a couple pairs of her shoes.  She does currently use custom orthotics.      Physical Examination: Incisional areas look great and well-healed.  The great toe is well surgically aligned and corrected.  She will does not much pain with palpation throughout the arch of the foot but does note some discomfort at the plantar aspect of the first MTP joint.  She wiggles her toes effectively without pain.  Range of motion to the great toe today is pretty decent I was able to achieve about 40 degrees of dorsiflexion and about 30 degrees of plantarflexion.  Swelling seems to be resolving although still minimally noted today.        Imaging:   Interpretation of imaging  Left foot XR: AP, Lateral, Oblique views     ICD-10-CM    1. Hallux valgus of left foot  M20.12 XR FOOT LEFT (MIN 3 VIEWS)      2. Arthritis of left midfoot  M19.072 XR FOOT LEFT (MIN 3 VIEWS)         Report: AP, lateral, oblique x-ray of the left foot demonstrates hallux valgus correction without hardware failure    Impression: Hallux valgus correction without hardware failure   Chica José, APRN - CNP           Assessment:   Status post left Lapidus bunionectomy with Baker osteotomy calcaneal autograft harvest with second TMT joint exostectomy.      Plan:   3 This is stable chronic illness/condition  Treatment at this time: Time with no intervention, Physical Therapy, and patient along has any restrictions on her levels of

## 2025-07-09 ENCOUNTER — OFFICE VISIT (OUTPATIENT)
Dept: INTERNAL MEDICINE CLINIC | Facility: CLINIC | Age: 70
End: 2025-07-09
Payer: MEDICARE

## 2025-07-09 VITALS
OXYGEN SATURATION: 97 % | BODY MASS INDEX: 28 KG/M2 | TEMPERATURE: 97 F | HEIGHT: 64 IN | WEIGHT: 164 LBS | DIASTOLIC BLOOD PRESSURE: 78 MMHG | SYSTOLIC BLOOD PRESSURE: 108 MMHG | HEART RATE: 79 BPM

## 2025-07-09 DIAGNOSIS — Z71.84 TRAVEL ADVICE ENCOUNTER: Primary | ICD-10-CM

## 2025-07-09 PROCEDURE — 1090F PRES/ABSN URINE INCON ASSESS: CPT | Performed by: NURSE PRACTITIONER

## 2025-07-09 PROCEDURE — 1160F RVW MEDS BY RX/DR IN RCRD: CPT | Performed by: NURSE PRACTITIONER

## 2025-07-09 PROCEDURE — G8427 DOCREV CUR MEDS BY ELIG CLIN: HCPCS | Performed by: NURSE PRACTITIONER

## 2025-07-09 PROCEDURE — 99213 OFFICE O/P EST LOW 20 MIN: CPT | Performed by: NURSE PRACTITIONER

## 2025-07-09 PROCEDURE — 1123F ACP DISCUSS/DSCN MKR DOCD: CPT | Performed by: NURSE PRACTITIONER

## 2025-07-09 PROCEDURE — 1159F MED LIST DOCD IN RCRD: CPT | Performed by: NURSE PRACTITIONER

## 2025-07-09 PROCEDURE — 3017F COLORECTAL CA SCREEN DOC REV: CPT | Performed by: NURSE PRACTITIONER

## 2025-07-09 PROCEDURE — G8417 CALC BMI ABV UP PARAM F/U: HCPCS | Performed by: NURSE PRACTITIONER

## 2025-07-09 PROCEDURE — G8400 PT W/DXA NO RESULTS DOC: HCPCS | Performed by: NURSE PRACTITIONER

## 2025-07-09 PROCEDURE — 1036F TOBACCO NON-USER: CPT | Performed by: NURSE PRACTITIONER

## 2025-07-09 RX ORDER — AZITHROMYCIN 250 MG/1
TABLET, FILM COATED ORAL
Qty: 6 TABLET | Refills: 0 | Status: SHIPPED | OUTPATIENT
Start: 2025-07-09 | End: 2025-07-19

## 2025-07-09 NOTE — PROGRESS NOTES
Abby Stevegins (: 1955)     History of Present Illness  The patient presents for a pre-travel check.    She is planning a trip to [place] on 2025, which will last for 12 days. She has been experiencing some foot swelling, which she manages with magnesium salt. Despite this, she has been able to engage in light jogging and agility exercises. She reports feeling well overall but mentions a lack of motivation for long-distance walking due to the heat. She has gained weight since 2025 and has been trying new herbal supplements, including blood orange and GLP-1, to aid in weight loss. She plans to discontinue the blood orange supplement once the current bottle is finished. She reports no chest pain or shortness of breath. She has scopolamine patches, meclizine, and over-the-counter motion sickness pills. She also takes allergy medications and multivitamins as needed. She is not currently taking any pain medication.      Chief Complaint   Patient presents with    Follow-up     Would like check up prior to travel     Patient Active Problem List   Diagnosis    Sensorineural hearing loss, bilateral    Hearing loss of right ear    History of bunionectomy of right great toe    Environmental and seasonal allergies    Esophageal reflux    Golfers elbow of left upper extremity    Left elbow pain    Closed trimalleolar fracture of left ankle    Right shoulder pain    Foot pain, left    Hallux valgus of left foot    Vertigo    Allergic rhinitis    CAPPS (dyspnea on exertion)    Trigger finger, right ring finger    Motion sickness    Short-term memory loss    Prediabetes    Hallux valgus, left    Arthritis of left foot      Reviewed and updated this visit by provider:  Tobacco  Allergies  Meds  Problems  Med Hx  Surg Hx  Fam Hx       Immunizations:  Immunization status: up to date and documented.    Review of Systems - Negative except as stated in HPI    Visit Vitals  /78 (BP Site: Right

## 2025-07-17 RX ORDER — SCOPOLAMINE 1 MG/3D
1 PATCH, EXTENDED RELEASE TRANSDERMAL
Qty: 10 PATCH | Refills: 0 | Status: SHIPPED | OUTPATIENT
Start: 2025-07-17

## 2025-08-11 ENCOUNTER — TELEPHONE (OUTPATIENT)
Dept: ORTHOPEDIC SURGERY | Age: 70
End: 2025-08-11

## 2025-08-25 ENCOUNTER — OFFICE VISIT (OUTPATIENT)
Dept: ORTHOPEDIC SURGERY | Age: 70
End: 2025-08-25

## 2025-08-25 DIAGNOSIS — M20.12 HALLUX VALGUS OF LEFT FOOT: Primary | ICD-10-CM

## 2025-08-25 DIAGNOSIS — M19.072 ARTHRITIS OF LEFT MIDFOOT: ICD-10-CM

## 2025-08-25 PROCEDURE — 99024 POSTOP FOLLOW-UP VISIT: CPT | Performed by: NURSE PRACTITIONER

## (undated) DEVICE — GOWN,SIRUS,NONRNF,SETINSLV,XL,20/CS: Brand: MEDLINE

## (undated) DEVICE — STRETCH BANDAGE ROLL: Brand: DERMACEA

## (undated) DEVICE — BANDAGE GZ W2XL75IN ST RAYON POLY CNFRM STRTCH LTWT

## (undated) DEVICE — SOLUTION IRRIG 1000ML 0.9% SOD CHL USP POUR PLAS BTL

## (undated) DEVICE — GEL US 20GM NONIRRITATING OVERWRAPPED FILE PCH TRNSMIT

## (undated) DEVICE — SUTURE MCRYL SZ 5-0 L18IN ABSRB UD L13MM P-3 3/8 CIR PRIM Y493G

## (undated) DEVICE — Device

## (undated) DEVICE — ZIMMER® STERILE DISPOSABLE TOURNIQUET CUFF, DUAL PORT, SINGLE BLADDER, 12 IN. (30 CM)

## (undated) DEVICE — 48" PROBE COVER W/GEL, ULTRASOUND, STERILE: Brand: SITE-RITE

## (undated) DEVICE — INTENDED FOR TISSUE SEPARATION, AND OTHER PROCEDURES THAT REQUIRE A SHARP SURGICAL BLADE TO PUNCTURE OR CUT.: Brand: BARD-PARKER ®  SAFETY SCALPED

## (undated) DEVICE — DERMABOND SKIN ADH 0.7ML -- DERMABOND ADVANCED 12/BX

## (undated) DEVICE — FOOT ANKLE PACK: Brand: MEDLINE INDUSTRIES, INC.

## (undated) DEVICE — APPLICATOR BNDG 1MM ADH PREMIERPRO EXOFIN

## (undated) DEVICE — NEEDLE HYPO 25GA L1.5IN BLU POLYPR HUB S STL REG BVL STR

## (undated) DEVICE — BNDG,ELSTC,MATRIX,STRL,2"X5YD,LF,HOOK&LP: Brand: MEDLINE

## (undated) DEVICE — ADHESIVE SKIN CLSR 0.7ML TOP DERMBND ADV

## (undated) DEVICE — HAND PACK: Brand: MEDLINE INDUSTRIES, INC.

## (undated) DEVICE — GLOVE SURG SZ 65 CRM LTX FREE POLYISOPRENE POLYMER BEAD ANTI

## (undated) DEVICE — PADDING CAST W3INXL4YD COT BLEND MIC PLEAT UNDERCAST SPEC

## (undated) DEVICE — BLADE MYR OFFSET 45DEG SPEAR TIP NAR SHFT W/ RND KNURLED

## (undated) DEVICE — GLOVE ORANGE PI 8 1/2   MSG9085

## (undated) DEVICE — JOINT PREP INSTRUMENT KIT: Brand: ORTHOLOC™ 2

## (undated) DEVICE — STERILE HOOK LOCK LATEX FREE ELASTIC BANDAGE 2INX5YD: Brand: HOOK LOCK™

## (undated) DEVICE — DRILL BIT

## (undated) DEVICE — SUTURE VCRL RAPIDE SZ 4-0 L18IN ABSRB UD PS-3 L13MM 3/8 CIR VR494

## (undated) DEVICE — PADDING CAST W2INXL4YD ST COT COHESIVE HND TEARABLE SPEC

## (undated) DEVICE — DISPOSABLE BIPOLAR CODE, 12' (3.66 M): Brand: CONMED

## (undated) DEVICE — SUTURE VCRL RAPIDE SZ 4-0 L18IN ABSRB UD L19MM PS-2 1/2 CIR VR496

## (undated) DEVICE — GUIDEWIRE ORTH DIA1.4MM FOR MAXTORQUE CANN SCR SYS

## (undated) DEVICE — BANDAGE COMPR L5YDXW2IN FOAM CO FLX

## (undated) DEVICE — DRAPE,HAND,STERILE: Brand: MEDLINE

## (undated) DEVICE — DRAPE, FILM SHEET, 44X65 STERILE: Brand: MEDLINE

## (undated) DEVICE — ZIMMER® STERILE DISPOSABLE TOURNIQUET CUFF WITH PLC, DUAL PORT, SINGLE BLADDER, 18 IN. (46 CM)

## (undated) DEVICE — GLOVE SURG SZ 65 L12IN FNGR THK79MIL GRN LTX FREE

## (undated) DEVICE — SUTURE ETHLN SZ 4-0 L18IN NONABSORBABLE BLK L19MM PS-2 3/8 1667H

## (undated) DEVICE — KIT PROCEDURE SURG HEAD AND NECK TOTE

## (undated) DEVICE — PREP SKN CHLRAPRP APL 26ML STR --

## (undated) DEVICE — (D)PREP SKN CHLRAPRP APPL 26ML -- CONVERT TO ITEM 371833

## (undated) DEVICE — PRECISION THIN, OFFSET (5.5 X 0.38 X 25.0MM)

## (undated) DEVICE — SURGICAL PROCEDURE PACK BASIC ST FRANCIS

## (undated) DEVICE — BANDAGE,ELASTIC,ESMARK,STERILE,4"X9',LF: Brand: MEDLINE

## (undated) DEVICE — DEVICE ULTRAGUIDE TFR HANDHELD SINGLE USE

## (undated) DEVICE — DRESSING,GAUZE,XEROFORM,CURAD,1"X8",ST: Brand: CURAD

## (undated) DEVICE — GLOVE ORANGE PI 7 1/2   MSG9075

## (undated) DEVICE — SOLUTION IRRIG 1000ML LAC RINGER PLAS POUR BTL

## (undated) DEVICE — SUTURE PROL SZ 4-0 L18IN NONABSORBABLE BLU L13MM P-3 3/8 8699G

## (undated) DEVICE — GLOVE SURG SZ 8 L12IN FNGR THK79MIL GRN LTX FREE

## (undated) DEVICE — AMD ANTIMICROBIAL GAUZE SPONGES,12 PLY USP TYPE VII, 0.2% POLYHEXAMETHYLENE BIGUANIDE HCI (PHMB): Brand: CURITY

## (undated) DEVICE — STRIP SKIN CLSR W0.25XL4IN WHT SPUNBOUND FBR NYL HI ADH

## (undated) DEVICE — CANISTER, RIGID, 2000CC: Brand: MEDLINE INDUSTRIES, INC.

## (undated) DEVICE — SOLUTION IV 1000ML 0.9% SOD CHL